# Patient Record
Sex: FEMALE | Race: WHITE | NOT HISPANIC OR LATINO | Employment: UNEMPLOYED | URBAN - METROPOLITAN AREA
[De-identification: names, ages, dates, MRNs, and addresses within clinical notes are randomized per-mention and may not be internally consistent; named-entity substitution may affect disease eponyms.]

---

## 2020-03-01 ENCOUNTER — APPOINTMENT (OUTPATIENT)
Dept: ULTRASOUND IMAGING | Facility: HOSPITAL | Age: 55
End: 2020-03-01

## 2020-03-01 ENCOUNTER — HOSPITAL ENCOUNTER (INPATIENT)
Facility: HOSPITAL | Age: 55
LOS: 5 days | Discharge: NURSING, REHAB OR LONG-TERM CARE | End: 2020-03-07
Attending: HOSPITALIST | Admitting: INTERNAL MEDICINE

## 2020-03-01 DIAGNOSIS — E86.0 DEHYDRATION: ICD-10-CM

## 2020-03-01 DIAGNOSIS — R11.2 NON-INTRACTABLE VOMITING WITH NAUSEA, UNSPECIFIED VOMITING TYPE: ICD-10-CM

## 2020-03-01 DIAGNOSIS — R73.9 HYPERGLYCEMIA: ICD-10-CM

## 2020-03-01 DIAGNOSIS — R19.7 DIARRHEA, UNSPECIFIED TYPE: ICD-10-CM

## 2020-03-01 DIAGNOSIS — E87.6 HYPOKALEMIA, INADEQUATE INTAKE: Primary | ICD-10-CM

## 2020-03-01 DIAGNOSIS — B37.0 ORAL THRUSH: ICD-10-CM

## 2020-03-01 PROBLEM — R74.01 TRANSAMINITIS: Status: ACTIVE | Noted: 2020-03-01

## 2020-03-01 PROBLEM — IMO0001 ALCOHOLISM /ALCOHOL ABUSE: Chronic | Status: ACTIVE | Noted: 2020-03-01

## 2020-03-01 PROBLEM — G47.00 INSOMNIA: Chronic | Status: ACTIVE | Noted: 2020-03-01

## 2020-03-01 PROBLEM — Z72.0 TOBACCO ABUSE: Chronic | Status: ACTIVE | Noted: 2020-03-01

## 2020-03-01 PROBLEM — E87.1 HYPONATREMIA: Status: ACTIVE | Noted: 2020-03-01

## 2020-03-01 LAB
ALBUMIN SERPL-MCNC: 3.1 G/DL (ref 3.5–5.2)
ALBUMIN/GLOB SERPL: 1.6 G/DL
ALP SERPL-CCNC: 155 U/L (ref 39–117)
ALT SERPL W P-5'-P-CCNC: 60 U/L (ref 1–33)
ANION GAP SERPL CALCULATED.3IONS-SCNC: 24 MMOL/L (ref 5–15)
ANION GAP SERPL CALCULATED.3IONS-SCNC: 29 MMOL/L (ref 5–15)
AST SERPL-CCNC: 52 U/L (ref 1–32)
BACTERIA UR QL AUTO: ABNORMAL /HPF
BASOPHILS # BLD AUTO: 0 10*3/MM3 (ref 0–0.2)
BASOPHILS NFR BLD AUTO: 0.3 % (ref 0–1.5)
BILIRUB SERPL-MCNC: 1.2 MG/DL (ref 0.2–1.2)
BILIRUB UR QL STRIP: ABNORMAL
BUN BLD-MCNC: 12 MG/DL (ref 6–20)
BUN BLD-MCNC: 9 MG/DL (ref 6–20)
BUN/CREAT SERPL: 17.4 (ref 7–25)
BUN/CREAT SERPL: 18 (ref 7–25)
CALCIUM SPEC-SCNC: 6.9 MG/DL (ref 8.6–10.5)
CALCIUM SPEC-SCNC: 7.3 MG/DL (ref 8.6–10.5)
CHLORIDE SERPL-SCNC: 71 MMOL/L (ref 98–107)
CHLORIDE SERPL-SCNC: 80 MMOL/L (ref 98–107)
CLARITY UR: ABNORMAL
CO2 SERPL-SCNC: 23 MMOL/L (ref 22–29)
CO2 SERPL-SCNC: 24 MMOL/L (ref 22–29)
COLOR UR: YELLOW
CREAT BLD-MCNC: 0.5 MG/DL (ref 0.57–1)
CREAT BLD-MCNC: 0.69 MG/DL (ref 0.57–1)
DEPRECATED RDW RBC AUTO: 44.2 FL (ref 37–54)
EOSINOPHIL # BLD AUTO: 0 10*3/MM3 (ref 0–0.4)
EOSINOPHIL NFR BLD AUTO: 0.1 % (ref 0.3–6.2)
ERYTHROCYTE [DISTWIDTH] IN BLOOD BY AUTOMATED COUNT: 12.3 % (ref 12.3–15.4)
GFR SERPL CREATININE-BSD FRML MDRD: 128 ML/MIN/1.73
GFR SERPL CREATININE-BSD FRML MDRD: 88 ML/MIN/1.73
GLOBULIN UR ELPH-MCNC: 1.9 GM/DL
GLUCOSE BLD-MCNC: 325 MG/DL (ref 65–99)
GLUCOSE BLD-MCNC: 419 MG/DL (ref 65–99)
GLUCOSE BLDC GLUCOMTR-MCNC: 322 MG/DL (ref 70–105)
GLUCOSE BLDC GLUCOMTR-MCNC: 333 MG/DL (ref 70–105)
GLUCOSE BLDC GLUCOMTR-MCNC: 460 MG/DL (ref 70–105)
GLUCOSE UR STRIP-MCNC: ABNORMAL MG/DL
HCT VFR BLD AUTO: 40 % (ref 34–46.6)
HGB BLD-MCNC: 14.6 G/DL (ref 12–15.9)
HGB UR QL STRIP.AUTO: ABNORMAL
HYALINE CASTS UR QL AUTO: ABNORMAL /LPF
INR PPP: 0.98 (ref 0.9–1.1)
KETONES UR QL STRIP: ABNORMAL
KETONES UR QL STRIP: ABNORMAL
LEUKOCYTE ESTERASE UR QL STRIP.AUTO: ABNORMAL
LIPASE SERPL-CCNC: 39 U/L (ref 13–60)
LYMPHOCYTES # BLD AUTO: 1.1 10*3/MM3 (ref 0.7–3.1)
LYMPHOCYTES NFR BLD AUTO: 15.9 % (ref 19.6–45.3)
MAGNESIUM SERPL-MCNC: 1.8 MG/DL (ref 1.6–2.6)
MCH RBC QN AUTO: 36.9 PG (ref 26.6–33)
MCHC RBC AUTO-ENTMCNC: 36.5 G/DL (ref 31.5–35.7)
MCV RBC AUTO: 101.1 FL (ref 79–97)
MONOCYTES # BLD AUTO: 0.7 10*3/MM3 (ref 0.1–0.9)
MONOCYTES NFR BLD AUTO: 10 % (ref 5–12)
NEUTROPHILS # BLD AUTO: 5.2 10*3/MM3 (ref 1.7–7)
NEUTROPHILS NFR BLD AUTO: 73.7 % (ref 42.7–76)
NITRITE UR QL STRIP: NEGATIVE
NRBC BLD AUTO-RTO: 0.1 /100 WBC (ref 0–0.2)
NT-PROBNP SERPL-MCNC: 2043 PG/ML (ref 5–900)
PH UR STRIP.AUTO: 6.5 [PH] (ref 5–8)
PLATELET # BLD AUTO: 230 10*3/MM3 (ref 140–450)
PMV BLD AUTO: 11.1 FL (ref 6–12)
POTASSIUM BLD-SCNC: 1.6 MMOL/L (ref 3.5–5.2)
POTASSIUM BLD-SCNC: 2.3 MMOL/L (ref 3.5–5.2)
POTASSIUM BLD-SCNC: 2.3 MMOL/L (ref 3.5–5.2)
PROT SERPL-MCNC: 5 G/DL (ref 6–8.5)
PROT UR QL STRIP: ABNORMAL
PROTHROMBIN TIME: 10.3 SECONDS (ref 9.6–11.7)
RBC # BLD AUTO: 3.96 10*6/MM3 (ref 3.77–5.28)
RBC # UR: ABNORMAL /HPF
REF LAB TEST METHOD: ABNORMAL
SODIUM BLD-SCNC: 124 MMOL/L (ref 136–145)
SODIUM BLD-SCNC: 127 MMOL/L (ref 136–145)
SP GR UR STRIP: 1.02 (ref 1–1.03)
SQUAMOUS #/AREA URNS HPF: ABNORMAL /HPF
TROPONIN T SERPL-MCNC: <0.01 NG/ML (ref 0–0.03)
UROBILINOGEN UR QL STRIP: ABNORMAL
WBC NRBC COR # BLD: 7 10*3/MM3 (ref 3.4–10.8)
WBC UR QL AUTO: ABNORMAL /HPF

## 2020-03-01 PROCEDURE — 85610 PROTHROMBIN TIME: CPT | Performed by: NURSE PRACTITIONER

## 2020-03-01 PROCEDURE — 63710000001 INSULIN LISPRO (HUMAN) PER 5 UNITS: Performed by: INTERNAL MEDICINE

## 2020-03-01 PROCEDURE — 25810000003 SODIUM CHLORIDE 0.9 % WITH KCL 20 MEQ 20-0.9 MEQ/L-% SOLUTION: Performed by: HOSPITALIST

## 2020-03-01 PROCEDURE — 84132 ASSAY OF SERUM POTASSIUM: CPT | Performed by: PHYSICIAN ASSISTANT

## 2020-03-01 PROCEDURE — 25810000003 SODIUM CHLORIDE 0.9 % WITH KCL 40 MEQ/L 40-0.9 MEQ/L-% SOLUTION: Performed by: INTERNAL MEDICINE

## 2020-03-01 PROCEDURE — 83690 ASSAY OF LIPASE: CPT | Performed by: PHYSICIAN ASSISTANT

## 2020-03-01 PROCEDURE — 99284 EMERGENCY DEPT VISIT MOD MDM: CPT

## 2020-03-01 PROCEDURE — 25010000002 ONDANSETRON PER 1 MG: Performed by: NURSE PRACTITIONER

## 2020-03-01 PROCEDURE — 85025 COMPLETE CBC W/AUTO DIFF WBC: CPT | Performed by: NURSE PRACTITIONER

## 2020-03-01 PROCEDURE — 25010000003 POTASSIUM CHLORIDE 10 MEQ/100ML SOLUTION: Performed by: HOSPITALIST

## 2020-03-01 PROCEDURE — 82962 GLUCOSE BLOOD TEST: CPT

## 2020-03-01 PROCEDURE — 81003 URINALYSIS AUTO W/O SCOPE: CPT | Performed by: INTERNAL MEDICINE

## 2020-03-01 PROCEDURE — 83880 ASSAY OF NATRIURETIC PEPTIDE: CPT | Performed by: NURSE PRACTITIONER

## 2020-03-01 PROCEDURE — 25010000003 POTASSIUM CHLORIDE 10 MEQ/100ML SOLUTION: Performed by: NURSE PRACTITIONER

## 2020-03-01 PROCEDURE — 84484 ASSAY OF TROPONIN QUANT: CPT | Performed by: NURSE PRACTITIONER

## 2020-03-01 PROCEDURE — 93005 ELECTROCARDIOGRAM TRACING: CPT

## 2020-03-01 PROCEDURE — G0378 HOSPITAL OBSERVATION PER HR: HCPCS

## 2020-03-01 PROCEDURE — 76705 ECHO EXAM OF ABDOMEN: CPT

## 2020-03-01 PROCEDURE — 81001 URINALYSIS AUTO W/SCOPE: CPT | Performed by: NURSE PRACTITIONER

## 2020-03-01 PROCEDURE — 93005 ELECTROCARDIOGRAM TRACING: CPT | Performed by: HOSPITALIST

## 2020-03-01 PROCEDURE — 36415 COLL VENOUS BLD VENIPUNCTURE: CPT

## 2020-03-01 PROCEDURE — 80053 COMPREHEN METABOLIC PANEL: CPT | Performed by: NURSE PRACTITIONER

## 2020-03-01 PROCEDURE — 99219 PR INITIAL OBSERVATION CARE/DAY 50 MINUTES: CPT | Performed by: PHYSICIAN ASSISTANT

## 2020-03-01 PROCEDURE — 83735 ASSAY OF MAGNESIUM: CPT | Performed by: HOSPITALIST

## 2020-03-01 PROCEDURE — 87086 URINE CULTURE/COLONY COUNT: CPT | Performed by: NURSE PRACTITIONER

## 2020-03-01 PROCEDURE — 80048 BASIC METABOLIC PNL TOTAL CA: CPT | Performed by: INTERNAL MEDICINE

## 2020-03-01 RX ORDER — LORAZEPAM 0.5 MG/1
0.5 TABLET ORAL
Status: DISCONTINUED | OUTPATIENT
Start: 2020-03-01 | End: 2020-03-06

## 2020-03-01 RX ORDER — LORAZEPAM 2 MG/ML
0.5 INJECTION INTRAMUSCULAR
Status: DISCONTINUED | OUTPATIENT
Start: 2020-03-01 | End: 2020-03-06

## 2020-03-01 RX ORDER — MAGNESIUM SULFATE HEPTAHYDRATE 40 MG/ML
2 INJECTION, SOLUTION INTRAVENOUS AS NEEDED
Status: DISCONTINUED | OUTPATIENT
Start: 2020-03-01 | End: 2020-03-07 | Stop reason: HOSPADM

## 2020-03-01 RX ORDER — ONDANSETRON 4 MG/1
4 TABLET, FILM COATED ORAL EVERY 6 HOURS PRN
Status: DISCONTINUED | OUTPATIENT
Start: 2020-03-01 | End: 2020-03-07 | Stop reason: HOSPADM

## 2020-03-01 RX ORDER — CALCIUM GLUCONATE 20 MG/ML
2 INJECTION, SOLUTION INTRAVENOUS AS NEEDED
Status: DISCONTINUED | OUTPATIENT
Start: 2020-03-01 | End: 2020-03-07 | Stop reason: HOSPADM

## 2020-03-01 RX ORDER — SODIUM CHLORIDE 0.9 % (FLUSH) 0.9 %
10 SYRINGE (ML) INJECTION EVERY 12 HOURS SCHEDULED
Status: DISCONTINUED | OUTPATIENT
Start: 2020-03-01 | End: 2020-03-07 | Stop reason: HOSPADM

## 2020-03-01 RX ORDER — SODIUM CHLORIDE AND POTASSIUM CHLORIDE 300; 900 MG/100ML; MG/100ML
150 INJECTION, SOLUTION INTRAVENOUS CONTINUOUS
Status: DISCONTINUED | OUTPATIENT
Start: 2020-03-01 | End: 2020-03-03

## 2020-03-01 RX ORDER — POTASSIUM CHLORIDE 20 MEQ/1
40 TABLET, EXTENDED RELEASE ORAL AS NEEDED
Status: DISCONTINUED | OUTPATIENT
Start: 2020-03-01 | End: 2020-03-07 | Stop reason: HOSPADM

## 2020-03-01 RX ORDER — LORAZEPAM 2 MG/ML
1 INJECTION INTRAMUSCULAR
Status: DISCONTINUED | OUTPATIENT
Start: 2020-03-01 | End: 2020-03-06

## 2020-03-01 RX ORDER — SODIUM CHLORIDE 0.9 % (FLUSH) 0.9 %
10 SYRINGE (ML) INJECTION AS NEEDED
Status: DISCONTINUED | OUTPATIENT
Start: 2020-03-01 | End: 2020-03-07 | Stop reason: HOSPADM

## 2020-03-01 RX ORDER — CALCIUM CARBONATE 200(500)MG
2 TABLET,CHEWABLE ORAL 2 TIMES DAILY PRN
Status: DISCONTINUED | OUTPATIENT
Start: 2020-03-01 | End: 2020-03-06

## 2020-03-01 RX ORDER — MAGNESIUM SULFATE HEPTAHYDRATE 40 MG/ML
4 INJECTION, SOLUTION INTRAVENOUS AS NEEDED
Status: DISCONTINUED | OUTPATIENT
Start: 2020-03-01 | End: 2020-03-07 | Stop reason: HOSPADM

## 2020-03-01 RX ORDER — POTASSIUM CHLORIDE 7.45 MG/ML
10 INJECTION INTRAVENOUS
Status: DISCONTINUED | OUTPATIENT
Start: 2020-03-01 | End: 2020-03-07 | Stop reason: HOSPADM

## 2020-03-01 RX ORDER — POTASSIUM CHLORIDE 20 MEQ/1
40 TABLET, EXTENDED RELEASE ORAL ONCE
Status: COMPLETED | OUTPATIENT
Start: 2020-03-01 | End: 2020-03-01

## 2020-03-01 RX ORDER — DOCUSATE SODIUM 100 MG/1
100 CAPSULE, LIQUID FILLED ORAL 2 TIMES DAILY PRN
Status: DISCONTINUED | OUTPATIENT
Start: 2020-03-01 | End: 2020-03-05

## 2020-03-01 RX ORDER — NICOTINE 21 MG/24HR
1 PATCH, TRANSDERMAL 24 HOURS TRANSDERMAL NIGHTLY
Status: DISCONTINUED | OUTPATIENT
Start: 2020-03-01 | End: 2020-03-06

## 2020-03-01 RX ORDER — SODIUM CHLORIDE AND POTASSIUM CHLORIDE 150; 900 MG/100ML; MG/100ML
100 INJECTION, SOLUTION INTRAVENOUS CONTINUOUS
Status: DISCONTINUED | OUTPATIENT
Start: 2020-03-01 | End: 2020-03-01

## 2020-03-01 RX ORDER — POTASSIUM CHLORIDE 1.5 G/1.77G
40 POWDER, FOR SOLUTION ORAL AS NEEDED
Status: DISCONTINUED | OUTPATIENT
Start: 2020-03-01 | End: 2020-03-07 | Stop reason: HOSPADM

## 2020-03-01 RX ORDER — POTASSIUM CHLORIDE 7.45 MG/ML
10 INJECTION INTRAVENOUS
Status: DISCONTINUED | OUTPATIENT
Start: 2020-03-01 | End: 2020-03-01

## 2020-03-01 RX ORDER — IBUPROFEN 200 MG
200 TABLET ORAL EVERY 6 HOURS PRN
COMMUNITY
End: 2020-03-07 | Stop reason: HOSPADM

## 2020-03-01 RX ORDER — LORAZEPAM 1 MG/1
1 TABLET ORAL
Status: DISCONTINUED | OUTPATIENT
Start: 2020-03-01 | End: 2020-03-06

## 2020-03-01 RX ORDER — ONDANSETRON 2 MG/ML
4 INJECTION INTRAMUSCULAR; INTRAVENOUS EVERY 6 HOURS PRN
Status: DISCONTINUED | OUTPATIENT
Start: 2020-03-01 | End: 2020-03-07 | Stop reason: HOSPADM

## 2020-03-01 RX ORDER — CALCIUM GLUCONATE 20 MG/ML
1 INJECTION, SOLUTION INTRAVENOUS AS NEEDED
Status: DISCONTINUED | OUTPATIENT
Start: 2020-03-01 | End: 2020-03-07 | Stop reason: HOSPADM

## 2020-03-01 RX ORDER — ONDANSETRON 2 MG/ML
4 INJECTION INTRAMUSCULAR; INTRAVENOUS ONCE
Status: COMPLETED | OUTPATIENT
Start: 2020-03-01 | End: 2020-03-01

## 2020-03-01 RX ORDER — LOPERAMIDE HYDROCHLORIDE 2 MG/1
2 CAPSULE ORAL 4 TIMES DAILY PRN
Status: DISCONTINUED | OUTPATIENT
Start: 2020-03-01 | End: 2020-03-05

## 2020-03-01 RX ORDER — NITROGLYCERIN 0.4 MG/1
0.4 TABLET SUBLINGUAL
Status: DISCONTINUED | OUTPATIENT
Start: 2020-03-01 | End: 2020-03-07 | Stop reason: HOSPADM

## 2020-03-01 RX ORDER — CHOLECALCIFEROL (VITAMIN D3) 125 MCG
5 CAPSULE ORAL NIGHTLY PRN
Status: DISCONTINUED | OUTPATIENT
Start: 2020-03-01 | End: 2020-03-07 | Stop reason: HOSPADM

## 2020-03-01 RX ADMIN — SODIUM CHLORIDE AND POTASSIUM CHLORIDE 150 ML/HR: 9; 2.98 INJECTION, SOLUTION INTRAVENOUS at 22:43

## 2020-03-01 RX ADMIN — SODIUM CHLORIDE, SODIUM LACTATE, POTASSIUM CHLORIDE, AND CALCIUM CHLORIDE 2000 ML: 600; 310; 30; 20 INJECTION, SOLUTION INTRAVENOUS at 17:49

## 2020-03-01 RX ADMIN — POTASSIUM CHLORIDE 10 MEQ: 7.46 INJECTION, SOLUTION INTRAVENOUS at 17:06

## 2020-03-01 RX ADMIN — POTASSIUM CHLORIDE 10 MEQ: 7.46 INJECTION, SOLUTION INTRAVENOUS at 18:10

## 2020-03-01 RX ADMIN — POTASSIUM CHLORIDE 10 MEQ: 7.46 INJECTION, SOLUTION INTRAVENOUS at 19:50

## 2020-03-01 RX ADMIN — ONDANSETRON 4 MG: 2 INJECTION INTRAMUSCULAR; INTRAVENOUS at 14:47

## 2020-03-01 RX ADMIN — SODIUM CHLORIDE AND POTASSIUM CHLORIDE 100 ML/HR: 9; 1.49 INJECTION, SOLUTION INTRAVENOUS at 18:56

## 2020-03-01 RX ADMIN — Medication 10 ML: at 21:04

## 2020-03-01 RX ADMIN — NICOTINE 1 PATCH: 21 PATCH TRANSDERMAL at 21:04

## 2020-03-01 RX ADMIN — POTASSIUM CHLORIDE 10 MEQ: 7.46 INJECTION, SOLUTION INTRAVENOUS at 21:34

## 2020-03-01 RX ADMIN — INSULIN LISPRO 8 UNITS: 100 INJECTION, SOLUTION INTRAVENOUS; SUBCUTANEOUS at 23:04

## 2020-03-01 RX ADMIN — NYSTATIN 500000 UNITS: 100000 SUSPENSION ORAL at 20:42

## 2020-03-01 RX ADMIN — SODIUM CHLORIDE 1000 ML: 0.9 INJECTION, SOLUTION INTRAVENOUS at 14:47

## 2020-03-01 RX ADMIN — POTASSIUM CHLORIDE 40 MEQ: 1500 TABLET, EXTENDED RELEASE ORAL at 17:48

## 2020-03-01 NOTE — ED PROVIDER NOTES
Subjective   Patient is a 54-year-old thin female who presents with a 9-day history of nausea vomiting and diarrhea.  She states she has become very weak and had several falls over the last several days.  She denies any pain fever -chills or body aches and pains.  Denies exposure to the flu.  He states she has had decreased oral intake.          Review of Systems   Constitutional: Positive for appetite change and fatigue. Negative for chills and fever.   HENT: Negative for congestion, tinnitus and trouble swallowing.    Eyes: Negative for photophobia, discharge and redness.   Respiratory: Negative for cough and shortness of breath.    Cardiovascular: Negative for chest pain and palpitations.   Gastrointestinal: Positive for diarrhea, nausea and vomiting. Negative for abdominal pain.   Genitourinary: Negative for dysuria, frequency and urgency.   Musculoskeletal: Negative for back pain, joint swelling and myalgias.   Skin: Negative for rash.   Neurological: Negative for dizziness and headaches.   Psychiatric/Behavioral: Negative for confusion.   All other systems reviewed and are negative.      Past Medical History:   Diagnosis Date   • Lupus (CMS/HCC)        Allergies   Allergen Reactions   • Penicillins Unknown - High Severity       Past Surgical History:   Procedure Laterality Date   •  SECTION     • CHOLECYSTECTOMY     • HYSTERECTOMY         History reviewed. No pertinent family history.    Social History     Socioeconomic History   • Marital status: Single     Spouse name: Not on file   • Number of children: Not on file   • Years of education: Not on file   • Highest education level: Not on file   Tobacco Use   • Smoking status: Current Some Day Smoker     Packs/day: 0.50   Substance and Sexual Activity   • Alcohol use: Yes     Alcohol/week: 4.0 standard drinks     Types: 4 Shots of liquor per week     Comment: a day   • Drug use: Never   • Sexual activity: Defer           Objective   Physical Exam  "  Constitutional: She is oriented to person, place, and time. She appears well-developed and well-nourished.   HENT:   Head: Normocephalic and atraumatic.   Mouth/Throat: Uvula is midline and oropharynx is clear and moist. Mucous membranes are dry.       Eyes: Pupils are equal, round, and reactive to light. Conjunctivae and EOM are normal.   Neck: Normal range of motion. Neck supple.   Cardiovascular: Normal rate, regular rhythm, normal heart sounds and intact distal pulses.   Pulmonary/Chest: Effort normal and breath sounds normal. No respiratory distress. She has no wheezes.   Abdominal: Soft. Bowel sounds are normal. She exhibits no distension and no mass. There is no tenderness. There is no rebound and no guarding.   Musculoskeletal: Normal range of motion. She exhibits no deformity.   Neurological: She is alert and oriented to person, place, and time. She displays normal reflexes. No cranial nerve deficit or sensory deficit. GCS eye subscore is 4. GCS verbal subscore is 5. GCS motor subscore is 6.   Skin: Skin is warm and dry. Capillary refill takes less than 2 seconds.   Psychiatric: She has a normal mood and affect.   Vitals reviewed.      ECG 12 Lead    Date/Time: 3/1/2020 3:18 PM  Performed by: Anahi Green APRN  Authorized by: Anahi Green APRN   Interpreted by ED physician: Luis.  Comparison: not compared with previous ECG   Previous ECG: no previous ECG available  Rhythm: sinus rhythm  Rate: normal  BPM: 93  QRS axis: normal  Conduction comments: Possible potassium abnormalities  ST Segments: ST segments normal  Clinical impression: abnormal ECG                 ED Course      BP 92/66   Pulse 88   Temp 98.3 °F (36.8 °C)   Resp 18   Ht 157.5 cm (62\")   Wt 47.8 kg (105 lb 6.1 oz)   SpO2 95%   BMI 19.27 kg/m²   Labs Reviewed   COMPREHENSIVE METABOLIC PANEL - Abnormal; Notable for the following components:       Result Value    Glucose 419 (*)     Sodium 124 (*)     Potassium 1.6 " (*)     Chloride 71 (*)     Calcium 7.3 (*)     Total Protein 5.0 (*)     Albumin 3.10 (*)     ALT (SGPT) 60 (*)     AST (SGOT) 52 (*)     Alkaline Phosphatase 155 (*)     Anion Gap 29.0 (*)     All other components within normal limits    Narrative:     GFR Normal >60  Chronic Kidney Disease <60  Kidney Failure <15     URINALYSIS W/ CULTURE IF INDICATED - Abnormal; Notable for the following components:    Appearance, UA Cloudy (*)     Glucose, UA >=1000 mg/dL (3+) (*)     Ketones, UA 80 mg/dL (3+) (*)     Bilirubin, UA Moderate (2+) (*)     Blood, UA Trace (*)     Protein, UA 30 mg/dL (1+) (*)     Leuk Esterase, UA Small (1+) (*)     All other components within normal limits   BNP (IN-HOUSE) - Abnormal; Notable for the following components:    proBNP 2,043.0 (*)     All other components within normal limits    Narrative:     Among patients with dyspnea, NT-proBNP is highly sensitive for the detection of acute congestive heart failure. In addition NT-proBNP of <300 pg/ml effectively rules out acute congestive heart failure with 99% negative predictive value.    Results may be falsely decreased if patient taking Biotin.     CBC WITH AUTO DIFFERENTIAL - Abnormal; Notable for the following components:    .1 (*)     MCH 36.9 (*)     MCHC 36.5 (*)     Lymphocyte % 15.9 (*)     Eosinophil % 0.1 (*)     All other components within normal limits   URINALYSIS, MICROSCOPIC ONLY - Abnormal; Notable for the following components:    RBC, UA 3-5 (*)     WBC, UA 6-12 (*)     Bacteria, UA Trace (*)     Squamous Epithelial Cells, UA 3-6 (*)     All other components within normal limits   POCT GLUCOSE FINGERSTICK - Abnormal; Notable for the following components:    Glucose 460 (*)     All other components within normal limits   PROTIME-INR - Normal   TROPONIN (IN-HOUSE) - Normal    Narrative:     Troponin T Reference Range:  <= 0.03 ng/mL-   Negative for AMI  >0.03 ng/mL-     Abnormal for myocardial necrosis.  Clinicians would  have to utilize clinical acumen, EKG, Troponin and serial changes to determine if it is an Acute Myocardial Infarction or myocardial injury due to an underlying chronic condition.       Results may be falsely decreased if patient taking Biotin.     URINE CULTURE   CBC AND DIFFERENTIAL    Narrative:     The following orders were created for panel order CBC & Differential.  Procedure                               Abnormality         Status                     ---------                               -----------         ------                     CBC Auto Differential[[id number]]        Abnormal            Final result                 Please view results for these tests on the individual orders.     Medications   sodium chloride 0.9 % flush 10 mL (has no administration in time range)   potassium chloride 10 mEq in 100 mL IVPB (has no administration in time range)   insulin regular (humuLIN R,novoLIN R) injection 8 Units (has no administration in time range)   sodium chloride 0.9 % bolus 1,000 mL (1,000 mL Intravenous New Bag 3/1/20 1447)   ondansetron (ZOFRAN) injection 4 mg (4 mg Intravenous Given 3/1/20 1447)     No radiology results for the last day                                       MDM  Number of Diagnoses or Management Options  Dehydration:   Diarrhea, unspecified type:   Hyperglycemia:   Hypokalemia, inadequate intake:   Non-intractable vomiting with nausea, unspecified vomiting type:   Oral thrush:   Diagnosis management comments: Patient had IV established and blood work was obtained urinalysis showed 80 ketones in the urine.  And had IV fluids initiated and lab work came back with a potassium of 1.6 the potassium replacement protocol was initiated.  The patient was also hyponatremic 124 and a corrected potassium of 132-the bicarb was found to be 24.  Patient was discussed with Dr. Gonzalez who agrees the patient is not acidotic at this time.-And IV insulin drip will not be initiated at this time the patient  did have IV potassium initiated I discussed the patient with Dr. Best  Patient will be admitted to the PCU.  The patient was made aware of these findings and the need for admission and was agreeable to this plan of care.    Finally the patient also has oral candidiasis and will be treated for such       Amount and/or Complexity of Data Reviewed  Clinical lab tests: reviewed  Tests in the medicine section of CPT®: reviewed    Critical Care  Total time providing critical care: 30-74 minutes    Patient Progress  Patient progress: improved      Final diagnoses:   Hypokalemia, inadequate intake   Non-intractable vomiting with nausea, unspecified vomiting type   Diarrhea, unspecified type   Dehydration   Hyperglycemia   Oral thrush            Anahi Green, GARFIELD  03/01/20 1621       Anahi Green, GARFIELD  03/01/20 1228

## 2020-03-01 NOTE — ED NOTES
Pt reports she started to feel weak 9 days ago. Pt states she can not stand up and she has tingling down her legs. Pt reports it has caused her to fall.     Jagdish Sr, LPN  03/01/20 8260

## 2020-03-02 ENCOUNTER — APPOINTMENT (OUTPATIENT)
Dept: GENERAL RADIOLOGY | Facility: HOSPITAL | Age: 55
End: 2020-03-02

## 2020-03-02 PROBLEM — E83.39 HYPOPHOSPHATEMIA: Status: ACTIVE | Noted: 2020-03-02

## 2020-03-02 PROBLEM — T73.0XXA STARVATION KETOACIDOSIS: Status: ACTIVE | Noted: 2020-03-02

## 2020-03-02 PROBLEM — E87.29 STARVATION KETOACIDOSIS: Status: ACTIVE | Noted: 2020-03-02

## 2020-03-02 PROBLEM — E11.9 TYPE 2 DIABETES MELLITUS: Status: ACTIVE | Noted: 2020-03-02

## 2020-03-02 LAB
ADV 40+41 DNA STL QL NAA+NON-PROBE: NOT DETECTED
ALBUMIN SERPL-MCNC: 2.8 G/DL (ref 3.5–5.2)
ALBUMIN/GLOB SERPL: 1.6 G/DL
ALP SERPL-CCNC: 121 U/L (ref 39–117)
ALT SERPL W P-5'-P-CCNC: 52 U/L (ref 1–33)
ANION GAP SERPL CALCULATED.3IONS-SCNC: 14 MMOL/L (ref 5–15)
ANION GAP SERPL CALCULATED.3IONS-SCNC: 14 MMOL/L (ref 5–15)
AST SERPL-CCNC: 55 U/L (ref 1–32)
ASTRO TYP 1-8 RNA STL QL NAA+NON-PROBE: NOT DETECTED
B PERT DNA SPEC QL NAA+PROBE: NOT DETECTED
BACTERIA SPEC AEROBE CULT: NORMAL
BASOPHILS # BLD AUTO: 0 10*3/MM3 (ref 0–0.2)
BASOPHILS NFR BLD AUTO: 0.5 % (ref 0–1.5)
BILIRUB SERPL-MCNC: 1.1 MG/DL (ref 0.2–1.2)
BUN BLD-MCNC: 6 MG/DL (ref 6–20)
BUN BLD-MCNC: 7 MG/DL (ref 6–20)
BUN/CREAT SERPL: 14.6 (ref 7–25)
BUN/CREAT SERPL: 15.8 (ref 7–25)
C CAYETANENSIS DNA STL QL NAA+NON-PROBE: NOT DETECTED
C PNEUM DNA NPH QL NAA+NON-PROBE: NOT DETECTED
CALCIUM SPEC-SCNC: 7.6 MG/DL (ref 8.6–10.5)
CALCIUM SPEC-SCNC: 7.7 MG/DL (ref 8.6–10.5)
CAMPY SP DNA.DIARRHEA STL QL NAA+PROBE: NOT DETECTED
CHLORIDE SERPL-SCNC: 92 MMOL/L (ref 98–107)
CHLORIDE SERPL-SCNC: 97 MMOL/L (ref 98–107)
CHOLEST SERPL-MCNC: 98 MG/DL (ref 0–200)
CK SERPL-CCNC: 41 U/L (ref 20–180)
CO2 SERPL-SCNC: 24 MMOL/L (ref 22–29)
CO2 SERPL-SCNC: 32 MMOL/L (ref 22–29)
CREAT BLD-MCNC: 0.38 MG/DL (ref 0.57–1)
CREAT BLD-MCNC: 0.48 MG/DL (ref 0.57–1)
CRYPTOSP STL CULT: NOT DETECTED
D DIMER PPP FEU-MCNC: 0.35 MCGFEU/ML (ref 0.17–0.59)
D-LACTATE SERPL-SCNC: 1.3 MMOL/L (ref 0.5–2)
DEPRECATED RDW RBC AUTO: 43.3 FL (ref 37–54)
E COLI DNA SPEC QL NAA+PROBE: NOT DETECTED
E HISTOLYT AG STL-ACNC: NOT DETECTED
EAEC PAA PLAS AGGR+AATA ST NAA+NON-PRB: DETECTED
EC STX1 + STX2 GENES STL NAA+PROBE: NOT DETECTED
EOSINOPHIL # BLD AUTO: 0 10*3/MM3 (ref 0–0.4)
EOSINOPHIL NFR BLD AUTO: 0.4 % (ref 0.3–6.2)
EPEC EAE GENE STL QL NAA+NON-PROBE: NOT DETECTED
ERYTHROCYTE [DISTWIDTH] IN BLOOD BY AUTOMATED COUNT: 12.2 % (ref 12.3–15.4)
ETEC LTA+ST1A+ST1B TOX ST NAA+NON-PROBE: NOT DETECTED
FLUAV H1 2009 PAND RNA NPH QL NAA+PROBE: NOT DETECTED
FLUAV H1 HA GENE NPH QL NAA+PROBE: NOT DETECTED
FLUAV H3 RNA NPH QL NAA+PROBE: NOT DETECTED
FLUAV SUBTYP SPEC NAA+PROBE: NOT DETECTED
FLUBV RNA ISLT QL NAA+PROBE: NOT DETECTED
G LAMBLIA DNA SPEC QL NAA+PROBE: NOT DETECTED
GFR SERPL CREATININE-BSD FRML MDRD: 134 ML/MIN/1.73
GFR SERPL CREATININE-BSD FRML MDRD: >150 ML/MIN/1.73
GLOBULIN UR ELPH-MCNC: 1.7 GM/DL
GLUCOSE BLD-MCNC: 141 MG/DL (ref 65–99)
GLUCOSE BLD-MCNC: 227 MG/DL (ref 65–99)
GLUCOSE BLDC GLUCOMTR-MCNC: 113 MG/DL (ref 70–105)
GLUCOSE BLDC GLUCOMTR-MCNC: 124 MG/DL (ref 70–105)
GLUCOSE BLDC GLUCOMTR-MCNC: 138 MG/DL (ref 70–105)
GLUCOSE BLDC GLUCOMTR-MCNC: 155 MG/DL (ref 70–105)
GLUCOSE BLDC GLUCOMTR-MCNC: 156 MG/DL (ref 70–105)
GLUCOSE BLDC GLUCOMTR-MCNC: 202 MG/DL (ref 70–105)
GLUCOSE BLDC GLUCOMTR-MCNC: 204 MG/DL (ref 70–105)
GLUCOSE BLDC GLUCOMTR-MCNC: 301 MG/DL (ref 70–105)
GLUCOSE BLDC GLUCOMTR-MCNC: 348 MG/DL (ref 70–105)
GLUCOSE BLDC GLUCOMTR-MCNC: 389 MG/DL (ref 70–105)
HADV DNA SPEC NAA+PROBE: NOT DETECTED
HBA1C MFR BLD: 12.9 % (ref 3.5–5.6)
HCOV 229E RNA SPEC QL NAA+PROBE: NOT DETECTED
HCOV HKU1 RNA SPEC QL NAA+PROBE: NOT DETECTED
HCOV NL63 RNA SPEC QL NAA+PROBE: NOT DETECTED
HCOV OC43 RNA SPEC QL NAA+PROBE: NOT DETECTED
HCT VFR BLD AUTO: 31.9 % (ref 34–46.6)
HDLC SERPL-MCNC: 38 MG/DL (ref 40–60)
HGB BLD-MCNC: 11.3 G/DL (ref 12–15.9)
HMPV RNA NPH QL NAA+NON-PROBE: NOT DETECTED
HPIV1 RNA SPEC QL NAA+PROBE: NOT DETECTED
HPIV2 RNA SPEC QL NAA+PROBE: NOT DETECTED
HPIV3 RNA NPH QL NAA+PROBE: NOT DETECTED
HPIV4 P GENE NPH QL NAA+PROBE: NOT DETECTED
KETONES UR QL STRIP: ABNORMAL
LDLC SERPL CALC-MCNC: 27 MG/DL (ref 0–100)
LDLC/HDLC SERPL: 0.72 {RATIO}
LYMPHOCYTES # BLD AUTO: 1.2 10*3/MM3 (ref 0.7–3.1)
LYMPHOCYTES NFR BLD AUTO: 19.4 % (ref 19.6–45.3)
M PNEUMO IGG SER IA-ACNC: NOT DETECTED
MAGNESIUM SERPL-MCNC: 1.5 MG/DL (ref 1.6–2.6)
MAGNESIUM SERPL-MCNC: 1.8 MG/DL (ref 1.6–2.6)
MCH RBC QN AUTO: 35.7 PG (ref 26.6–33)
MCHC RBC AUTO-ENTMCNC: 35.4 G/DL (ref 31.5–35.7)
MCV RBC AUTO: 100.9 FL (ref 79–97)
MONOCYTES # BLD AUTO: 0.5 10*3/MM3 (ref 0.1–0.9)
MONOCYTES NFR BLD AUTO: 8.8 % (ref 5–12)
NEUTROPHILS # BLD AUTO: 4.3 10*3/MM3 (ref 1.7–7)
NEUTROPHILS NFR BLD AUTO: 70.9 % (ref 42.7–76)
NOROVIRUS GI+II RNA STL QL NAA+NON-PROBE: NOT DETECTED
NRBC BLD AUTO-RTO: 0.2 /100 WBC (ref 0–0.2)
NT-PROBNP SERPL-MCNC: 1117 PG/ML (ref 5–900)
P SHIGELLOIDES DNA STL QL NAA+PROBE: NOT DETECTED
PHOSPHATE SERPL-MCNC: <0.3 MG/DL (ref 2.5–4.5)
PLATELET # BLD AUTO: 202 10*3/MM3 (ref 140–450)
PMV BLD AUTO: 9.7 FL (ref 6–12)
POTASSIUM BLD-SCNC: 2.2 MMOL/L (ref 3.5–5.2)
POTASSIUM BLD-SCNC: 3.3 MMOL/L (ref 3.5–5.2)
PROT SERPL-MCNC: 4.5 G/DL (ref 6–8.5)
RBC # BLD AUTO: 3.16 10*6/MM3 (ref 3.77–5.28)
RHINOVIRUS RNA SPEC NAA+PROBE: NOT DETECTED
RSV RNA NPH QL NAA+NON-PROBE: NOT DETECTED
RV RNA STL NAA+PROBE: NOT DETECTED
SALMONELLA DNA SPEC QL NAA+PROBE: NOT DETECTED
SAPO I+II+IV+V RNA STL QL NAA+NON-PROBE: NOT DETECTED
SHIGELLA SP+EIEC IPAH STL QL NAA+PROBE: NOT DETECTED
SODIUM BLD-SCNC: 135 MMOL/L (ref 136–145)
SODIUM BLD-SCNC: 138 MMOL/L (ref 136–145)
TRIGL SERPL-MCNC: 163 MG/DL (ref 0–150)
TSH SERPL DL<=0.05 MIU/L-ACNC: 6.58 UIU/ML (ref 0.27–4.2)
V CHOLERAE DNA SPEC QL NAA+PROBE: NOT DETECTED
VIBRIO DNA SPEC NAA+PROBE: NOT DETECTED
VIT B12 BLD-MCNC: 800 PG/ML (ref 211–946)
VLDLC SERPL-MCNC: 32.6 MG/DL
WBC NRBC COR # BLD: 6.1 10*3/MM3 (ref 3.4–10.8)
YERSINIA STL CULT: NOT DETECTED

## 2020-03-02 PROCEDURE — 82607 VITAMIN B-12: CPT | Performed by: HOSPITALIST

## 2020-03-02 PROCEDURE — 81003 URINALYSIS AUTO W/O SCOPE: CPT | Performed by: INTERNAL MEDICINE

## 2020-03-02 PROCEDURE — 85379 FIBRIN DEGRADATION QUANT: CPT | Performed by: PHYSICIAN ASSISTANT

## 2020-03-02 PROCEDURE — 85025 COMPLETE CBC W/AUTO DIFF WBC: CPT | Performed by: PHYSICIAN ASSISTANT

## 2020-03-02 PROCEDURE — 99222 1ST HOSP IP/OBS MODERATE 55: CPT | Performed by: INTERNAL MEDICINE

## 2020-03-02 PROCEDURE — 97116 GAIT TRAINING THERAPY: CPT

## 2020-03-02 PROCEDURE — 84443 ASSAY THYROID STIM HORMONE: CPT | Performed by: PHYSICIAN ASSISTANT

## 2020-03-02 PROCEDURE — 82962 GLUCOSE BLOOD TEST: CPT

## 2020-03-02 PROCEDURE — 97535 SELF CARE MNGMENT TRAINING: CPT

## 2020-03-02 PROCEDURE — 80061 LIPID PANEL: CPT | Performed by: PHYSICIAN ASSISTANT

## 2020-03-02 PROCEDURE — 84100 ASSAY OF PHOSPHORUS: CPT | Performed by: PHYSICIAN ASSISTANT

## 2020-03-02 PROCEDURE — 0097U HC BIOFIRE FILMARRAY GI PANEL: CPT | Performed by: PHYSICIAN ASSISTANT

## 2020-03-02 PROCEDURE — 0099U HC BIOFIRE FILMARRAY RESP PANEL 1: CPT | Performed by: PHYSICIAN ASSISTANT

## 2020-03-02 PROCEDURE — 25810000003 SODIUM CHLORIDE 0.9 % WITH KCL 40 MEQ/L 40-0.9 MEQ/L-% SOLUTION: Performed by: INTERNAL MEDICINE

## 2020-03-02 PROCEDURE — 25010000002 MAGNESIUM SULFATE 2 GM/50ML SOLUTION: Performed by: PHYSICIAN ASSISTANT

## 2020-03-02 PROCEDURE — 83880 ASSAY OF NATRIURETIC PEPTIDE: CPT | Performed by: PHYSICIAN ASSISTANT

## 2020-03-02 PROCEDURE — 63710000001 INSULIN GLARGINE PER 5 UNITS: Performed by: INTERNAL MEDICINE

## 2020-03-02 PROCEDURE — 63710000001 INSULIN LISPRO (HUMAN) PER 5 UNITS: Performed by: INTERNAL MEDICINE

## 2020-03-02 PROCEDURE — 80053 COMPREHEN METABOLIC PANEL: CPT | Performed by: PHYSICIAN ASSISTANT

## 2020-03-02 PROCEDURE — 82550 ASSAY OF CK (CPK): CPT | Performed by: PHYSICIAN ASSISTANT

## 2020-03-02 PROCEDURE — 83036 HEMOGLOBIN GLYCOSYLATED A1C: CPT | Performed by: INTERNAL MEDICINE

## 2020-03-02 PROCEDURE — 94640 AIRWAY INHALATION TREATMENT: CPT

## 2020-03-02 PROCEDURE — 25010000002 ENOXAPARIN PER 10 MG: Performed by: HOSPITALIST

## 2020-03-02 PROCEDURE — 83735 ASSAY OF MAGNESIUM: CPT | Performed by: PHYSICIAN ASSISTANT

## 2020-03-02 PROCEDURE — 93005 ELECTROCARDIOGRAM TRACING: CPT | Performed by: PHYSICIAN ASSISTANT

## 2020-03-02 PROCEDURE — 83605 ASSAY OF LACTIC ACID: CPT | Performed by: INTERNAL MEDICINE

## 2020-03-02 PROCEDURE — 71045 X-RAY EXAM CHEST 1 VIEW: CPT

## 2020-03-02 PROCEDURE — 97162 PT EVAL MOD COMPLEX 30 MIN: CPT

## 2020-03-02 PROCEDURE — 99232 SBSQ HOSP IP/OBS MODERATE 35: CPT | Performed by: HOSPITALIST

## 2020-03-02 PROCEDURE — 97165 OT EVAL LOW COMPLEX 30 MIN: CPT

## 2020-03-02 PROCEDURE — 94799 UNLISTED PULMONARY SVC/PX: CPT

## 2020-03-02 PROCEDURE — 83735 ASSAY OF MAGNESIUM: CPT | Performed by: HOSPITALIST

## 2020-03-02 RX ORDER — SODIUM CHLORIDE 0.9 % (FLUSH) 0.9 %
10 SYRINGE (ML) INJECTION EVERY 12 HOURS SCHEDULED
Status: DISCONTINUED | OUTPATIENT
Start: 2020-03-02 | End: 2020-03-07 | Stop reason: HOSPADM

## 2020-03-02 RX ORDER — IPRATROPIUM BROMIDE AND ALBUTEROL SULFATE 2.5; .5 MG/3ML; MG/3ML
3 SOLUTION RESPIRATORY (INHALATION) EVERY 4 HOURS PRN
Status: DISCONTINUED | OUTPATIENT
Start: 2020-03-02 | End: 2020-03-06

## 2020-03-02 RX ORDER — THIAMINE HCL 100 MG
100 TABLET ORAL DAILY
Status: DISCONTINUED | OUTPATIENT
Start: 2020-03-02 | End: 2020-03-07 | Stop reason: HOSPADM

## 2020-03-02 RX ORDER — DEXTROSE MONOHYDRATE 25 G/50ML
25 INJECTION, SOLUTION INTRAVENOUS
Status: DISCONTINUED | OUTPATIENT
Start: 2020-03-02 | End: 2020-03-07 | Stop reason: HOSPADM

## 2020-03-02 RX ORDER — FOLIC ACID 1 MG/1
1 TABLET ORAL DAILY
Status: DISCONTINUED | OUTPATIENT
Start: 2020-03-02 | End: 2020-03-07 | Stop reason: HOSPADM

## 2020-03-02 RX ORDER — INSULIN GLARGINE 100 [IU]/ML
15 INJECTION, SOLUTION SUBCUTANEOUS EVERY MORNING
Status: DISCONTINUED | OUTPATIENT
Start: 2020-03-02 | End: 2020-03-03

## 2020-03-02 RX ORDER — SODIUM CHLORIDE 9 MG/ML
100 INJECTION, SOLUTION INTRAVENOUS CONTINUOUS
Status: DISCONTINUED | OUTPATIENT
Start: 2020-03-02 | End: 2020-03-04

## 2020-03-02 RX ORDER — SODIUM CHLORIDE 0.9 % (FLUSH) 0.9 %
10 SYRINGE (ML) INJECTION AS NEEDED
Status: DISCONTINUED | OUTPATIENT
Start: 2020-03-02 | End: 2020-03-07 | Stop reason: HOSPADM

## 2020-03-02 RX ORDER — NICOTINE POLACRILEX 4 MG
15 LOZENGE BUCCAL
Status: DISCONTINUED | OUTPATIENT
Start: 2020-03-02 | End: 2020-03-07 | Stop reason: HOSPADM

## 2020-03-02 RX ORDER — AZITHROMYCIN 250 MG/1
500 TABLET, FILM COATED ORAL
Status: DISCONTINUED | OUTPATIENT
Start: 2020-03-02 | End: 2020-03-03

## 2020-03-02 RX ORDER — MULTIVITAMIN,THERAPEUTIC
1 TABLET ORAL DAILY
Status: DISCONTINUED | OUTPATIENT
Start: 2020-03-02 | End: 2020-03-07 | Stop reason: HOSPADM

## 2020-03-02 RX ADMIN — ENOXAPARIN SODIUM 40 MG: 40 INJECTION SUBCUTANEOUS at 15:49

## 2020-03-02 RX ADMIN — POTASSIUM CHLORIDE 40 MEQ: 1500 TABLET, EXTENDED RELEASE ORAL at 17:03

## 2020-03-02 RX ADMIN — MAGNESIUM SULFATE HEPTAHYDRATE 2 G: 40 INJECTION, SOLUTION INTRAVENOUS at 20:00

## 2020-03-02 RX ADMIN — NYSTATIN 500000 UNITS: 100000 SUSPENSION ORAL at 08:40

## 2020-03-02 RX ADMIN — POTASSIUM & SODIUM PHOSPHATES POWDER PACK 280-160-250 MG 2 PACKET: 280-160-250 PACK at 03:43

## 2020-03-02 RX ADMIN — NICOTINE 1 PATCH: 21 PATCH TRANSDERMAL at 21:08

## 2020-03-02 RX ADMIN — Medication 10 ML: at 09:22

## 2020-03-02 RX ADMIN — IPRATROPIUM BROMIDE AND ALBUTEROL SULFATE 3 ML: .5; 3 SOLUTION RESPIRATORY (INHALATION) at 03:35

## 2020-03-02 RX ADMIN — LOPERAMIDE HYDROCHLORIDE 2 MG: 2 CAPSULE ORAL at 03:39

## 2020-03-02 RX ADMIN — FOLIC ACID 1 MG: 1 TABLET ORAL at 09:22

## 2020-03-02 RX ADMIN — Medication 10 ML: at 21:08

## 2020-03-02 RX ADMIN — POTASSIUM CHLORIDE 40 MEQ: 1500 TABLET, EXTENDED RELEASE ORAL at 09:01

## 2020-03-02 RX ADMIN — POTASSIUM CHLORIDE 40 MEQ: 1500 TABLET, EXTENDED RELEASE ORAL at 10:51

## 2020-03-02 RX ADMIN — Medication 10 ML: at 21:11

## 2020-03-02 RX ADMIN — INSULIN LISPRO 5 UNITS: 100 INJECTION, SOLUTION INTRAVENOUS; SUBCUTANEOUS at 16:49

## 2020-03-02 RX ADMIN — POTASSIUM CHLORIDE 40 MEQ: 1500 TABLET, EXTENDED RELEASE ORAL at 03:39

## 2020-03-02 RX ADMIN — INSULIN GLARGINE 15 UNITS: 100 INJECTION, SOLUTION SUBCUTANEOUS at 12:17

## 2020-03-02 RX ADMIN — SODIUM PHOSPHATE, MONOBASIC, MONOHYDRATE 40 MMOL: 276; 142 INJECTION, SOLUTION INTRAVENOUS at 10:50

## 2020-03-02 RX ADMIN — THERA TABS 1 TABLET: TAB at 09:22

## 2020-03-02 RX ADMIN — POTASSIUM CHLORIDE 40 MEQ: 1500 TABLET, EXTENDED RELEASE ORAL at 21:08

## 2020-03-02 RX ADMIN — INSULIN LISPRO 2 UNITS: 100 INJECTION, SOLUTION INTRAVENOUS; SUBCUTANEOUS at 16:50

## 2020-03-02 RX ADMIN — Medication 100 MG: at 09:22

## 2020-03-02 RX ADMIN — SODIUM CHLORIDE 750 ML: 0.9 INJECTION, SOLUTION INTRAVENOUS at 03:00

## 2020-03-02 RX ADMIN — AZITHROMYCIN MONOHYDRATE 500 MG: 250 TABLET ORAL at 12:21

## 2020-03-02 RX ADMIN — INSULIN LISPRO 5 UNITS: 100 INJECTION, SOLUTION INTRAVENOUS; SUBCUTANEOUS at 12:23

## 2020-03-02 RX ADMIN — SODIUM CHLORIDE, SODIUM LACTATE, POTASSIUM CHLORIDE, AND CALCIUM CHLORIDE 500 ML: 600; 310; 30; 20 INJECTION, SOLUTION INTRAVENOUS at 06:26

## 2020-03-02 RX ADMIN — Medication 10 ML: at 08:41

## 2020-03-02 RX ADMIN — MAGNESIUM SULFATE HEPTAHYDRATE 2 G: 40 INJECTION, SOLUTION INTRAVENOUS at 22:52

## 2020-03-02 RX ADMIN — IPRATROPIUM BROMIDE AND ALBUTEROL SULFATE 3 ML: .5; 3 SOLUTION RESPIRATORY (INHALATION) at 20:19

## 2020-03-02 RX ADMIN — SODIUM CHLORIDE AND POTASSIUM CHLORIDE 150 ML/HR: 9; 2.98 INJECTION, SOLUTION INTRAVENOUS at 05:48

## 2020-03-02 RX ADMIN — MAGNESIUM SULFATE HEPTAHYDRATE 2 G: 40 INJECTION, SOLUTION INTRAVENOUS at 17:04

## 2020-03-02 NOTE — THERAPY EVALUATION
Patient Name: Smita Steele  : 1965    MRN: [id number]                              Today's Date: 3/2/2020       Admit Date: 3/1/2020    Visit Dx:     ICD-10-CM ICD-9-CM   1. Hypokalemia, inadequate intake E87.6 276.8   2. Non-intractable vomiting with nausea, unspecified vomiting type R11.2 787.01   3. Diarrhea, unspecified type R19.7 787.91   4. Dehydration E86.0 276.51   5. Hyperglycemia R73.9 790.29   6. Oral thrush B37.0 112.0     Patient Active Problem List   Diagnosis   • Hypokalemia, inadequate intake   • Hyperglycemia   • Tobacco abuse   • Alcoholism /alcohol abuse (CMS/HCC)   • Hyponatremia   • Transaminitis   • Insomnia   • Hypophosphatemia   • Starvation ketoacidosis     Past Medical History:   Diagnosis Date   • Lupus (CMS/HCC)      Past Surgical History:   Procedure Laterality Date   •  SECTION     • CHOLECYSTECTOMY     • HYSTERECTOMY       General Information     Row Name 20 1440          PT Evaluation Time/Intention    Document Type  evaluation  -HD     Mode of Treatment  physical therapy  -HD     Row Name 20 1440          General Information    Patient Profile Reviewed?  yes  -HD     Prior Level of Function  independent: Pt reports recent weakness with several falls and inability to navigate steps to bedroom.   -HD     Existing Precautions/Restrictions  fall  -HD     Row Name 20 1440          Relationship/Environment    Lives With  child(felicia), adult daughter works full time and pt has been home alone for long periods of time  -HD     Row Name 20 1440          Resource/Environmental Concerns    Current Living Arrangements  home/apartment/condo  -HD     Row Name 20 1440          Stairs Within Home, Primary    Stairs, Within Home, Primary  -- flight of steps to bedroom, recently pt has been sleeping on couch on main floor  -HD     Row Name 20 1440          Cognitive Assessment/Intervention- PT/OT    Orientation Status (Cognition)  oriented x 3   -HD     Row Name 03/02/20 1440          Safety Issues, Functional Mobility    Safety Issues Affecting Function (Mobility)  insight into deficits/self awareness;safety precaution awareness  -HD     Impairments Affecting Function (Mobility)  balance;endurance/activity tolerance;strength;postural/trunk control  -HD       User Key  (r) = Recorded By, (t) = Taken By, (c) = Cosigned By    Initials Name Provider Type     Gianna Gold, PT Physical Therapist        Mobility     Row Name 03/02/20 1441          Bed Mobility Assessment/Treatment    Bed Mobility Assessment/Treatment  supine-sit;sit-supine  -HD     Supine-Sit [place] (Bed Mobility)  moderate assist (50% patient effort)  -HD     Sit-Supine [place] (Bed Mobility)  moderate assist (50% patient effort) for BLE mgmt due to weakness  -HD     Row Name 03/02/20 1441          Sit-Stand Transfer    Sit-Stand [place] (Transfers)  minimum assist (75% patient effort)  -HD     Assistive Device (Sit-Stand Transfers)  walker, front-wheeled  -HD     Row Name 03/02/20 1441          Gait/Stairs Assessment/Training    Gait/Stairs Assessment/Training  gait/ambulation independence;gait/ambulation assistive device  -HD     [place] Level (Gait)  minimum assist (75% patient effort)  -HD     Assistive Device (Gait)  walker, front-wheeled  -HD     Distance in Feet (Gait)  2x 12 ft   -HD     Comment (Gait/Stairs)  slow padmini, impaired balance, decreased step length  -HD       User Key  (r) = Recorded By, (t) = Taken By, (c) = Cosigned By    Initials Name Provider Type     Gianna Gold, PT Physical Therapist        Obj/Interventions     Row Name 03/02/20 1442          General ROM    GENERAL ROM COMMENTS  BLEs WFL  -HD     Row Name 03/02/20 1442          MMT (Manual Muscle Testing)    General MMT Comments  bilateral ankles 3+/5 MMT, Bilateral hips 3-/5 MMT, bilateral knees 3+/5 MMT; muscle atrophy noted in BLEs  -HD     Row Name 03/02/20 1442          Static  Sitting Balance    Level of [place] (Unsupported Sitting, Static Balance)  supervision  -HD     Sitting Position (Unsupported Sitting, Static Balance)  sitting on edge of bed  -HD     Time Able to Maintain Position (Unsupported Sitting, Static Balance)  1 to 2 minutes  -HD     Row Name 03/02/20 1442          Dynamic Sitting Balance    Level of [place], Reaches Outside Midline (Sitting, Dynamic Balance)  contact guard assist  -HD     Sitting Position, Reaches Outside Midline (Sitting, Dynamic Balance)  sitting on edge of bed  -HD     Row Name 03/02/20 1442          Static Standing Balance    Level of [place] (Supported Standing, Static Balance)  minimal assist, 75% patient effort  -HD     Time Able to Maintain Position (Supported Standing, Static Balance)  2 to 3 minutes  -HD     Assistive Device Utilized (Supported Standing, Static Balance)  walker, rolling  -HD     Row Name 03/02/20 1442          Dynamic Standing Balance    Level of [place], Reaches Outside Midline (Standing, Dynamic Balance)  minimal assist, 75% patient effort  -HD     Time Able to Maintain Position, Reaches Outside Midline (Standing, Dynamic Balance)  1 to 2 minutes  -HD     Assistive Device Utilized (Supported Standing, Dynamic Balance)  walker, rolling  -HD       User Key  (r) = Recorded By, (t) = Taken By, (c) = Cosigned By    Initials Name Provider Type    HD Gianna Gold, PT Physical Therapist        Goals/Plan     Row Name 03/02/20 1446          Bed Mobility Goal 1 (PT)    Activity/Assistive Device (Bed Mobility Goal 1, PT)  bed mobility activities, all  -HD     [place] Level/Cues Needed (Bed Mobility Goal 1, PT)  independent  -HD     Time Frame (Bed Mobility Goal 1, PT)  2 weeks  -HD     Row Name 03/02/20 1446          Transfer Goal 1 (PT)    Activity/Assistive Device (Transfer Goal 1, PT)  transfers, all  -HD     [place] Level/Cues Needed (Transfer Goal 1, PT)  independent  -HD     Time Frame (Transfer Goal  1, PT)  2 weeks  -HD     Row Name 03/02/20 1446          Gait Training Goal 1 (PT)    Activity/Assistive Device (Gait Training Goal 1, PT)  gait (walking locomotion);assistive device use  -HD     [place] Level (Gait Training Goal 1, PT)  contact guard assist  -HD     Distance (Gait Goal 1, PT)  100 ft  -HD     Time Frame (Gait Training Goal 1, PT)  2 weeks  -HD       User Key  (r) = Recorded By, (t) = Taken By, (c) = Cosigned By    Initials Name Provider Type    Gianna Gifford, PT Physical Therapist        Clinical Impression     Row Name 03/02/20 1443          Pain Assessment    Additional Documentation  Pain Scale: Numbers Pre/Post-Treatment (Group)  -HD     Row Name 03/02/20 1443          Pain Scale: Numbers Pre/Post-Treatment    Pain Scale: Numbers, Pretreatment  0/10 - no pain  -HD     Pain Scale: Numbers, Post-Treatment  0/10 - no pain  -HD     Row Name 03/02/20 1443          Physical Therapy Clinical Impression    Patient/Family Goals Statement (PT Clinical Impression)  Pt is 55 yo female admitted for weakness, diarrhea, dehydration, falls, hypokalemia, hyperglycemia.  Pt d/o DM.   Pt has hx of ETOH.   -HD     Criteria for Skilled Interventions Met (PT Clinical Impression)  yes;treatment indicated  -HD     Rehab Potential (PT Clinical Summary)  fair, will monitor progress closely  -HD     Row Name 03/02/20 1443          Positioning and Restraints    Pre-Treatment Position  in bed  -HD     Post Treatment Position  bed  -HD     In Bed  notified nsg;encouraged to call for assist;call light within reach;exit alarm on  -HD       User Key  (r) = Recorded By, (t) = Taken By, (c) = Cosigned By    Initials Name Provider Type    Gianna Gifford, PT Physical Therapist        Outcome Measures     Row Name 03/02/20 1440          How much help from another person do you currently need...    Turning from your back to your side while in flat bed without using bedrails?  2  -HD     Moving from lying on back to  sitting on the side of a flat bed without bedrails?  2  -HD     Moving to and from a bed to a chair (including a wheelchair)?  3  -HD     Standing up from a chair using your arms (e.g., wheelchair, bedside chair)?  3  -HD     Climbing 3-5 steps with a railing?  1  -HD     To walk in hospital room?  3  -HD     AM-PAC 6 Clicks Score (PT)  14  -HD     Row Name 03/02/20 1446          Functional Assessment    Outcome Measure Options  AM-PAC 6 Clicks Basic Mobility (PT)  -HD       User Key  (r) = Recorded By, (t) = Taken By, (c) = Cosigned By    Initials Name Provider Type    HD Gianna Gold, PT Physical Therapist          PT Recommendation and Plan  Planned Therapy Interventions (PT Eval): bed mobility training, gait training, balance training, postural re-education, transfer training, neuromuscular re-education, patient/family education, strengthening, stair training  Outcome Summary/Treatment Plan (PT)  Anticipated Discharge Disposition (PT): inpatient rehabilitation facility  Plan of Care Reviewed With: patient  Outcome Summary: Pt is 53 yo female admitted for weakness, diarrhea, dehydration, falls, hypokalemia, hyperglycemia.  Pt d/o DM.   Pt has hx of ETOH. Pt reports several recents falls requiring assist of others to get upright.  Pt requiring Isidoro-modA for safety with bed mobility due to weakness.  Pt able to complete trnasfers with Isidoro and ambulate 2x12 ft with RW and Isidoro.  Pt with noted muscle atrophy in BLEs and easily fatigues with short distance ambulation.  Pt is not safe to return home alone no navigate a flight of steps to bedoom.  PT is recommending IP rehab to address deficits.  PT will follow daily while at [facility] and continue to assess d/c needs.     Time Calculation:   PT Charges     Row Name 03/02/20 9266             Time Calculation    Start Time  1351  -HD      Stop Time  1422  -HD      Time Calculation (min)  31 min  -HD      PT Received On  03/02/20  -HD      PT - Next Appointment  03/03/20   -HD      PT Goal Re-Cert Due Date  03/16/20  -HD         Time Calculation- PT    Total Timed Code Minutes- PT  15 minute(s)  -HD        User Key  (r) = Recorded By, (t) = Taken By, (c) = Cosigned By    Initials Name Provider Type    Gianna Gifford, PT Physical Therapist        Therapy Charges for Today     Code Description Service Date Service Provider Modifiers Qty    97075561376 HC PT EVAL MOD COMPLEXITY 2 3/2/2020 Gianna oGld, PT GP 1    87178809505 HC GAIT TRAINING EA 15 MIN 3/2/2020 Gianna Gold, PT GP 1          PT G-Codes  Outcome Measure Options: AM-PAC 6 Clicks Basic Mobility (PT)  AM-PAC 6 Clicks Score (PT): 14    Gianna Gold, YUE  3/2/2020

## 2020-03-02 NOTE — THERAPY EVALUATION
Acute Care - Occupational Therapy Initial Evaluation   [facility]     Patient Name: Smita Steele  : 1965  MRN: [id number]  Today's Date: 3/2/2020             Admit Date: 3/1/2020       ICD-10-CM ICD-9-CM   1. Hypokalemia, inadequate intake E87.6 276.8   2. Non-intractable vomiting with nausea, unspecified vomiting type R11.2 787.01   3. Diarrhea, unspecified type R19.7 787.91   4. Dehydration E86.0 276.51   5. Hyperglycemia R73.9 790.29   6. Oral thrush B37.0 112.0     Patient Active Problem List   Diagnosis   • Hypokalemia, inadequate intake   • Hyperglycemia   • Tobacco abuse   • Alcoholism /alcohol abuse (CMS/HCC)   • Hyponatremia   • Transaminitis   • Insomnia   • Hypophosphatemia   • Starvation ketoacidosis     Past Medical History:   Diagnosis Date   • Lupus (CMS/HCC)      Past Surgical History:   Procedure Laterality Date   •  SECTION     • CHOLECYSTECTOMY     • HYSTERECTOMY            OT ASSESSMENT FLOWSHEET (last 12 hours)      Occupational Therapy Evaluation     Row Name 20 1600                   OT Evaluation Time/Intention    Subjective Information  complains of;weakness  -MP        Document Type  evaluation  -MP        Mode of Treatment  occupational therapy  -MP           General Information    Patient Profile Reviewed?  yes  -MP        Prior Level of Function  independent:;ADL's  -MP        Existing Precautions/Restrictions  fall  -MP           Relationship/Environment    Lives With  child(felicia), adult  -MP        Family Caregiver if Needed  child(felicia), adult  -MP           Resource/Environmental Concerns    Current Living Arrangements  home/apartment/condo  -MP        Resource/Environmental Concerns  none  -MP           Cognitive Assessment/Intervention- PT/OT    Orientation Status (Cognition)  oriented x 3  -MP           Bed Mobility Assessment/Treatment    Supine-Sit [place] (Bed Mobility)  moderate assist (50% patient effort)  -MP        Sit-Supine [place] (Bed  Mobility)  moderate assist (50% patient effort)  -MP           Functional Mobility    Functional Mobility- Ind. Level  contact guard assist;1 person  -MP        Functional Mobility- Device  rolling walker  -MP           Transfer Assessment/Treatment    Transfer Assessment/Treatment  toilet transfer  -MP           Sit-Stand Transfer    Sit-Stand [place] (Transfers)  minimum assist (75% patient effort)  -MP           Toilet Transfer    Type (Toilet Transfer)  sit-stand;stand-sit  -MP        [place] Level (Toilet Transfer)  moderate assist (50% patient effort);1 person assist  -MP           ADL Assessment/Intervention    BADL Assessment/Intervention  toileting;lower body dressing  -MP           Lower Body Dressing Assessment/Training    Lower Body Dressing [place] Level  don;socks;set up;verbal cues  -MP        Lower Body Dressing Position  edge of bed sitting  -MP           Toileting Assessment/Training    [place] Level (Toileting)  toileting skills;supervision;set up  -MP        Assistive Devices (Toileting)  commode  -MP        Toileting Position  unsupported sitting  -MP           General ROM    GENERAL ROM COMMENTS  BUE WFL  -MP           MMT (Manual Muscle Testing)    General MMT Comments  BUE 4-/5  -MP           Positioning and Restraints    Pre-Treatment Position  in bed  -MP        Post Treatment Position  bed  -MP        In Bed  call light within reach;encouraged to call for assist;exit alarm on  -MP           Pain Scale: Numbers Pre/Post-Treatment    Pain Scale: Numbers, Pretreatment  0/10 - no pain  -MP        Pain Scale: Numbers, Post-Treatment  0/10 - no pain  -MP           Plan of Care Review    Plan of Care Reviewed With  patient  -MP        Progress  no change  -MP           Clinical Impression (OT)    Criteria for Skilled Therapeutic Interventions Met (OT Eval)  yes;treatment indicated  -MP        Rehab Potential (OT Eval)  good, to achieve stated therapy goals  -MP        Therapy  Frequency (OT Eval)  3 times/wk  -MP        Care Plan Review (OT)  evaluation/treatment results reviewed  -MP        Anticipated Discharge Disposition (OT)  inpatient rehabilitation facility  -MP           OT Goals    Bed Mobility Goal Selection (OT)  bed mobility, OT goal 1  -MP        Transfer Goal Selection (OT)  transfer, OT goal 1  -MP        Toileting Goal Selection (OT)  toileting, OT goal 1  -MP           Bed Mobility Goal 1 (OT)    Activity/Assistive Device (Bed Mobility Goal 1, OT)  sit to supine;supine to sit  -MP        [place] Level/Cues Needed (Bed Mobility Goal 1, OT)  contact guard assist;1 person assist  -MP        Time Frame (Bed Mobility Goal 1, OT)  long term goal (LTG);2 weeks  -MP           Transfer Goal 1 (OT)    Activity/Assistive Device (Transfer Goal 1, OT)  sit-to-stand/stand-to-sit;toilet  -MP        [place] Level/Cues Needed (Transfer Goal 1, OT)  contact guard assist;1 person assist  -MP        Time Frame (Transfer Goal 1, OT)  long term goal (LTG);2 weeks  -MP           Toileting Goal 1 (OT)    Activity/Device (Toileting Goal 1, OT)  toileting skills, all  -MP        [place] Level/Cues Needed (Toileting Goal 1, OT)  independent  -MP        Time Frame (Toileting Goal 1, OT)  long term goal (LTG);2 weeks  -MP          User Key  (r) = Recorded By, (t) = Taken By, (c) = Cosigned By    Initials Name Effective Dates    Kody Perry, OT 03/01/19 -                OT Recommendation and Plan  Outcome Summary/Treatment Plan (OT)  Anticipated Discharge Disposition (OT): inpatient rehabilitation facility  Therapy Frequency (OT Eval): 3 times/wk  Plan of Care Review  Plan of Care Reviewed With: patient  Plan of Care Reviewed With: patient  Outcome Summary: Pt. is 55 y/o female admit w/ weakness, dehydration, falls, hyperglycemia. Pt. w/ new d/o DM. Pt. reports several falls at home w/ progressive weakness x 1 month. Pt. ADL independent at baseline, requires moderate assist  for ADL transfers w/ setup assist for toileting activity. Pt. w/o 24 hour care/support, will require IP rehab at d/c to address aforementioned deficits. Will follow up w/ pt. 1-3x per week at [facility].        Time Calculation:   Time Calculation- OT     Row Name 03/02/20 1628 03/02/20 1251          Time Calculation- OT    OT Start Time  1335  -MP  --     OT Stop Time  1400  -MP  --     OT Time Calculation (min)  25 min  -MP  --     Total Timed Code Minutes- OT  10 minute(s)  -MP  --     OT Received On  03/02/20  -MP  --     OT - Next Appointment  03/04/20  -MP  03/03/20  -     OT Goal Re-Cert Due Date  03/16/20  -  --       User Key  (r) = Recorded By, (t) = Taken By, (c) = Cosigned By    Initials Name Provider Type    MP Kody Godinez OT Occupational Therapist        Therapy Charges for Today     Code Description Service Date Service Provider Modifiers Qty    [id number]  OT EVAL LOW COMPLEXITY 3 3/2/2020 Kody Godinez OT GO 1    [id number]  OT SELF CARE/MGMT/TRAIN EA 15 MIN 3/2/2020 Kody Godinez OT GO 1               Kody Godinez OT  3/2/2020

## 2020-03-02 NOTE — PROGRESS NOTES
Discharge Planning Assessment  [facility]     Patient Name: Smita Steele  MRN: [id number]  Today's Date: 3/2/2020    Admit Date: 3/1/2020    Discharge Needs Assessment     Row Name 03/02/20 1558       Living Environment    Lives With  child(felicia), adult    Name(s) of Who Lives With Patient  dtr    Current Living Arrangements  home/apartment/condo    Primary Care Provided by  self;child(felicia)    Provides Primary Care For  no one    Family Caregiver if Needed  child(felicia), adult    Able to Return to Prior Arrangements  no       Resource/Environmental Concerns    Resource/Environmental Concerns  none       Transition Planning    Patient/Family Anticipates Transition to  inpatient rehabilitation facility    Transportation Anticipated  family or friend will provide       Discharge Needs Assessment    Readmission Within the Last 30 Days  no previous admission in last 30 days    Concerns to be Addressed  no discharge needs identified;denies needs/concerns at this time    Equipment Currently Used at Home  none    Equipment Needed After Discharge  walker, rolling        Discharge Plan     Row Name 03/02/20 1558       Plan    Plan  PT/OT evals pending. Anticipate Rehab Needs. Choices pending.     Provided Post Acute Provider List?  Yes    Post Acute Provider List  Inpatient Rehab    Delivered To  Patient    Method of Delivery  In person    Patient/Family in Agreement with Plan  yes    Plan Comments  Pt reports being independent until approx 2 weeks ago. Now with significant weakness. Lives with dtr who works Full time and unable to assist as much as pt requires. Discussed possibility of IP rehab needs, both pt and dtr aggreeable. Pt states she was just qualified for presumptive eligibility. Informed that due to HPE, placement options may be limited.               Aicha Nevarez RN

## 2020-03-02 NOTE — PLAN OF CARE
Problem: Patient Care Overview  Goal: Plan of Care Review  Outcome: Ongoing (interventions implemented as appropriate)  Flowsheets (Taken 3/2/2020 6737)  Plan of Care Reviewed With: patient  Outcome Summary: Pt is 55 yo female admitted for weakness, diarrhea, dehydration, falls, hypokalemia, hyperglycemia.  Pt d/o DM.   Pt has hx of ETOH. Pt reports several recents falls requiring assist of others to get upright.  Pt requiring Isidoro-modA for safety with bed mobility due to weakness.  Pt able to complete transfers with Isidoro and ambulate 2x12 ft with RW and Isidoro.  Pt with noted muscle atrophy in BLEs and easily fatigues with short distance ambulation.  Pt is not safe to return home alone no navigate a flight of steps to bedoom.  PT is recommending IP rehab to address deficits.  PT will follow daily while at [facility] and continue to assess d/c needs.

## 2020-03-02 NOTE — CONSULTS
"Diabetes Education  Assessment/Teaching    Patient Name:  Smita Steele  YOB: 1965  MRN: [id number]  Admit Date:  3/1/2020      Assessment Date:  3/2/2020    Most Recent Value   General Information    Referral From:  Blood glucose, MD order [Adm bs 419 mg/dl. Pt newly diagnosed with DM. A1c has been ordered but has not been resulted yet. 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  avoiding high blood sugar, avoiding low blood sugar, diabetes complications, medications for diabetes, testing my blood sugar at home, understanding diabetes   Nutrition Information   Assessment Topics   Healthy Eating - Assessment  Needs education   Being Active - Assessment  Needs education   Taking Medication - Assessment  Needs education   Problem Solving - Assessment  Needs education   Reducing Risk - Assessment  Needs education   Monitoring - Assessment  Needs education   DM Goals   Monitoring - Goal  Today            Most Recent Value   DM Education Needs   Meter  Meter provided [Gave pt the Accuchek Guide bs meter and instructed pt in use of meter. She performed return demo correctly. ]   Meter Type  Accuchek   Blood Glucose Target Range  Discussed with pt her adm bs of 419. Reviewed diagnostic criteria for DM. Explained her A1c result has not come back yet. Discussed difference between type 1 and type 2.    Teaching Method  Explanation, Discussion, Demonstration, Teach back, Handouts   Patient Response  Verbalized understanding, Demonstrates adequately            Other Comments:  Discussed diabetes disease process. Discussed healthy bs range and " instructed pt in use of Accuchek Guide Me bs meter. Pt performed return demo correctly. Pt states feeling dizzy and requesting more education at later time. BS at 1030 348 mg/dl. Notified endocrinologist of bs reading and pt's need for insulin. Pt not on any DM med at this time. Left at bedside the Diabetes First Steps Booklet, One Plate Method for meal planning, A1c info sheet and log book. Will follow up when pt feeling better.         Electronically signed by:  Annabelle Espinal RN  03/02/20 10:52 AM

## 2020-03-02 NOTE — PLAN OF CARE
Pt hypotension has resolved. Orthostatic BP's normal. Pt positive e-coli of the stool and has been started on abx. Pt has gotten up to the bathroom with a walker multiple times today with no c/o dizziness. A1C 12.9. Pt is a newly diagnosed diabetic. Potassium and phos have been replaced today. Awaiting rechecks

## 2020-03-02 NOTE — PROGRESS NOTES
Social Work Assessment  [facility]     Patient Name: Smita Steele  MRN: [id number]  Today's Date: 3/2/2020    Admit Date: 3/1/2020    Psychosocial     Row Name 03/02/20 1516       Coping/Stress    Major Change/Loss/Stressor  financial    Coping/Stress Comments  Pt was screened by [employer] & found to be eligible for Medicaid (TGMI: $1040), screened for HPE (RID#: [id number]). Pt had reported going on medical leave two days prior. Pt doesn't have a PCP listed at this time & will need to be screened for potential needs.      ELADIA Baird    Phone: [phone]  Cell: [phone]  Fax: [phone]  [email or web address]

## 2020-03-02 NOTE — PROGRESS NOTES
"      [facility] Medicine Services Daily Progress Note      Hospitalist Team  LOS 0 days      Patient Care Team:  Provider, No Known as PCP - General    Patient Location: 2115/1      Subjective   Subjective     Chief Complaint / Subjective  Chief Complaint   Patient presents with   • Weakness - Generalized         Brief Synopsis of Hospital Course/HPI      The patient is a 54 y.o. female with past medical history of lupus, alcohol and tobacco abuse who presented to [facility] ED on 3/1/2020 complaining of 9 days of nausea, vomiting, diarrhea and weakness.    The patient has not seen a physician for 3 years and is originally from [place].  She admits to drinking alcohol daily.  The patient has been falling at home because of weakness and denies focal weakness or dizziness before falls.    Date::    3/2/20: Patient received IV fluid boluses for low BP overnight.  Has not seen a physician for 3 years. Originally from [place].  History of lupus.  Not on medications at home.  Afebrile.  Claims to have several days of diarrhea and started on azithromycin for E. coli positive stool.  Endocrinology consulted.      Review of Systems   All other systems reviewed and are negative.        Objective   Objective      Vital Signs  Temp:  [97.6 °F (36.4 °C)-98.9 °F (37.2 °C)] 97.6 °F (36.4 °C)  Heart Rate:  [] 108  Resp:  [12-20] 16  BP: ()/() 96/59  Oxygen Therapy  SpO2: 96 %  Pulse Oximetry Type: Continuous  Device (Oxygen Therapy): room air  Flow (L/min): 2  Flowsheet Rows      First Filed Value   Admission Height  157.5 cm (62\") Documented at 03/01/2020 1345   Admission Weight  47.8 kg (105 lb 6.1 oz) Documented at 03/01/2020 1345        Intake & Output (last 3 days)       02/28 0701 - 02/29 0700 02/29 0701 - 03/01 0700 03/01 0701 - 03/02 0700 03/02 0701 - 03/03 0700    P.O.   0 500    Total Intake(mL/kg)   0 (0) 500 (9.8)    Urine (mL/kg/hr)   1100 450 (0.7)    Stool   0 0    " Total Output   1100 450    Net   -1100 +50            Urine Unmeasured Occurrence    2 x    Stool Unmeasured Occurrence   3 x 4 x        Lines, Drains & Airways    Active LDAs     Name:   Placement date:   Placement time:   Site:   Days:    Peripheral IV 03/01/20 1429 Left Antecubital   03/01/20    1429    Antecubital   1    Peripheral IV 03/02/20 1200 Left;Posterior Hand   03/02/20    1200    Hand   less than 1                  Physical Exam:    Physical Exam   Constitutional: She is oriented to person, place, and time. She appears well-developed.   HENT:   Head: Normocephalic.   Mouth/Throat: Mucous membranes are dry.   Eyes: Pupils are equal, round, and reactive to light.   Neck: Trachea normal and full passive range of motion without pain.   Cardiovascular: Normal rate and regular rhythm.   Pulmonary/Chest: Effort normal and breath sounds normal.   Musculoskeletal:   Moves all extremities.   Lymphadenopathy:     She has no cervical adenopathy.   Neurological: She is alert and oriented to person, place, and time. No cranial nerve deficit.   Skin: Skin is warm and dry.   Psychiatric: She has a normal mood and affect. Her speech is normal and behavior is normal. Cognition and memory are normal.     Procedures: None      Results Review:     I reviewed the patient's new clinical results.      Lab Results (last 24 hours)     Procedure Component Value Units Date/Time    Vitamin B12 [[id number]]  (Normal) Collected:  03/02/20 0810    Specimen:  Blood Updated:  03/02/20 1702     Vitamin B-12 800 pg/mL     Narrative:       Results may be falsely increased if patient taking Biotin.      Basic Metabolic Panel [[id number]]  (Abnormal) Collected:  03/02/20 1607    Specimen:  Blood Updated:  03/02/20 1650     Glucose 227 mg/dL      BUN 6 mg/dL      Creatinine 0.38 mg/dL      Sodium 135 mmol/L      Potassium 3.3 mmol/L      Chloride 97 mmol/L      CO2 24.0 mmol/L      Calcium 7.7 mg/dL      eGFR Non African Amer >150 mL/min/1.73       BUN/Creatinine Ratio 15.8     Anion Gap 14.0 mmol/L     Narrative:       GFR Normal >60  Chronic Kidney Disease <60  Kidney Failure <15      Magnesium [[id number]]  (Abnormal) Collected:  03/02/20 1607    Specimen:  Blood Updated:  03/02/20 1648     Magnesium 1.5 mg/dL     POC Glucose Once [[id number]]  (Abnormal) Collected:  03/02/20 1632    Specimen:  Blood Updated:  03/02/20 1636     Glucose 204 mg/dL      Comment: Serial Number: [id number]:  [id number]       Urine Culture - Urine, Urine, Clean Catch [[id number]] Collected:  03/01/20 1530    Specimen:  Urine, Clean Catch Updated:  03/02/20 1634     Urine Culture >100,000 CFU/mL Mixed Emre Isolated    Narrative:       Specimen contains mixed organisms of questionable pathogenicity which indicates contamination with commensal emre.  Further identification is unlikely to provide clinically useful information.  Suggest recollection.    Hemoglobin A1c [[id number]]  (Abnormal) Collected:  03/02/20 0258    Specimen:  Blood Updated:  03/02/20 1548     Hemoglobin A1C 12.9 %     Narrative:       Hemoglobin A1C Reference Range:    <5.7 %        Normal  5.7-6.4 %     Increased risk for diabetes  > 6.4 %        Diabetes       These guidelines have been recommended by the American Diabetic Association for Hgb A1c.      The following 2010 guidelines have been recommended by the American Diabetes Association for Hemoglobin A1c.    HBA1c 5.7-6.4% Increased risk for future diabetes (pre-diabetes)  HBA1c     >6.4% Diabetes      POC Glucose Once [[id number]]  (Abnormal) Collected:  03/02/20 1157    Specimen:  Blood Updated:  03/02/20 1215     Glucose 389 mg/dL      Comment: Serial Number: [id number]:  [id number]       POC Glucose Once [[id number]]  (Abnormal) Collected:  03/02/20 1029    Specimen:  Blood Updated:  03/02/20 1031     Glucose 348 mg/dL      Comment: Serial Number: [id number]:  [id number]       Gastrointestinal Panel, PCR - Stool, Per Rectum  [[id number]]  (Abnormal) Collected:  03/02/20 0440    Specimen:  Stool from Per Rectum Updated:  03/02/20 0841     Campylobacter Not Detected     Plesiomonas shigelloides Not Detected     Salmonella Not Detected     Vibrio Not Detected     Vibrio cholerae Not Detected     Yersinia enterocolitica Not Detected     Enteroaggregative E. coli (EAEC) Detected     Enteropathogenic E. coli (EPEC) Not Detected     Enterotoxigenic E. coli (ETEC) lt/st Not Detected     Shiga-like toxin-producing E. coli (STEC) stx1/stx2 Not Detected     E. coli O157 Not Detected     Shigella/Enteroinvasive E. coli (EIEC) Not Detected     Cryptosporidium Not Detected     Cyclospora cayetanensis Not Detected     Entamoeba histolytica Not Detected     Giardia lamblia Not Detected     Adenovirus F40/41 Not Detected     Astrovirus Not Detected     Norovirus GI/GII Not Detected     Rotavirus A Not Detected     Sapovirus (I, II, IV or V) Not Detected    Narrative:       If Aeromonas, Staphylococcus aureus or Bacillus cereus are suspected, please order [id number]: Stool Culture, Aeromonas, S aureus, B Cereus.    POC Glucose Once [[id number]]  (Abnormal) Collected:  03/02/20 0805    Specimen:  Blood Updated:  03/02/20 0808     Glucose 202 mg/dL      Comment: Serial Number: [id number]:  [id number]       POC Glucose Once [[id number]]  (Abnormal) Collected:  03/02/20 0628    Specimen:  Blood Updated:  03/02/20 0630     Glucose 155 mg/dL      Comment: Serial Number: [id number]:  [id number]       Respiratory Panel, PCR - Swab, Nasopharynx [[id number]]  (Normal) Collected:  03/02/20 0434    Specimen:  Swab from Nasopharynx Updated:  03/02/20 0629     ADENOVIRUS, PCR Not Detected     Coronavirus 229E Not Detected     Coronavirus HKU1 Not Detected     Coronavirus NL63 Not Detected     Coronavirus OC43 Not Detected     Human Metapneumovirus Not Detected     Human Rhinovirus/Enterovirus Not Detected     Influenza B PCR Not Detected     Parainfluenza Virus 1  Not Detected     Parainfluenza Virus 2 Not Detected     Parainfluenza Virus 3 Not Detected     Parainfluenza Virus 4 Not Detected     Bordetella pertussis pcr Not Detected     Influenza A H1 2009 PCR Not Detected     Chlamydophila pneumoniae PCR Not Detected     Mycoplasma pneumo by PCR Not Detected     Influenza A PCR Not Detected     Influenza A H3 Not Detected     Influenza A H1 Not Detected     RSV, PCR Not Detected    Lactic Acid, Plasma [[id number]]  (Normal) Collected:  03/02/20 0528    Specimen:  Blood Updated:  03/02/20 0558     Lactate 1.3 mmol/L     D-dimer, Quantitative [[id number]]  (Normal) Collected:  03/02/20 0528    Specimen:  Blood Updated:  03/02/20 0550     D-Dimer, Quantitative 0.35 MCGFEU/mL     Narrative:       Reference Range  --------------------------------------------------------------------     < 0.50   Negative Predictive Value  0.50-0.59   Indeterminate    >= 0.60   Probable VTE             A very low percentage of patients with DVT may yield D-Dimer results   below the cut-off of 0.50 MCGFEU/mL.  This is known to be more   prevalent in patients with distal DVT.             Results of this test should always be interpreted in conjunction with   the patient's medical history, clinical presentation and other   findings.  Clinical diagnosis should not be based on the result of   INNOVANCE D-Dimer alone.    POC Glucose Once [[id number]]  (Abnormal) Collected:  03/02/20 0400    Specimen:  Blood Updated:  03/02/20 0401     Glucose 113 mg/dL      Comment: Serial Number: [id number]:  [id number]       Phosphorus [[id number]]  (Abnormal) Collected:  03/02/20 0258    Specimen:  Blood Updated:  03/02/20 0339     Phosphorus <0.3 mg/dL     BNP [[id number]]  (Abnormal) Collected:  03/02/20 0258    Specimen:  Blood Updated:  03/02/20 0331     proBNP 1,117.0 pg/mL     Narrative:       Among patients with dyspnea, NT-proBNP is highly sensitive for the detection of acute congestive heart failure. In  addition NT-proBNP of <300 pg/ml effectively rules out acute congestive heart failure with 99% negative predictive value.    Results may be falsely decreased if patient taking Biotin.      TSH [[id number]]  (Abnormal) Collected:  03/02/20 0258    Specimen:  Blood Updated:  03/02/20 0331     TSH 6.580 uIU/mL     Comprehensive Metabolic Panel [[id number]]  (Abnormal) Collected:  03/02/20 0258    Specimen:  Blood Updated:  03/02/20 0330     Glucose 141 mg/dL      BUN 7 mg/dL      Creatinine 0.48 mg/dL      Sodium 138 mmol/L      Potassium 2.2 mmol/L      Chloride 92 mmol/L      CO2 32.0 mmol/L      Calcium 7.6 mg/dL      Total Protein 4.5 g/dL      Albumin 2.80 g/dL      ALT (SGPT) 52 U/L      AST (SGOT) 55 U/L      Alkaline Phosphatase 121 U/L      Total Bilirubin 1.1 mg/dL      eGFR Non African Amer 134 mL/min/1.73      Globulin 1.7 gm/dL      A/G Ratio 1.6 g/dL      BUN/Creatinine Ratio 14.6     Anion Gap 14.0 mmol/L     Narrative:       GFR Normal >60  Chronic Kidney Disease <60  Kidney Failure <15      Lipid Panel [[id number]]  (Abnormal) Collected:  03/02/20 0258    Specimen:  Blood Updated:  03/02/20 0327     Total Cholesterol 98 mg/dL      Triglycerides 163 mg/dL      HDL Cholesterol 38 mg/dL      LDL Cholesterol  27 mg/dL      VLDL Cholesterol 32.6 mg/dL      LDL/HDL Ratio 0.72    Narrative:       Cholesterol Reference Ranges  (U.S. Department of Health and Human Services ATP III Classifications)    Desirable          <200 mg/dL  Borderline High    200-239 mg/dL  High Risk          >240 mg/dL      Triglyceride Reference Ranges  (U.S. Department of Health and Human Services ATP III Classifications)    Normal           <150 mg/dL  Borderline High  150-199 mg/dL  High             200-499 mg/dL  Very High        >500 mg/dL    HDL Reference Ranges  (U.S. Department of Health and Human Services ATP III Classifcations)    Low     <40 mg/dl (major risk factor for CHD)  High    >60 mg/dl ('negative' risk factor for  CHD)        LDL Reference Ranges  (U.S. Department of Health and Human Services ATP III Classifcations)    Optimal          <100 mg/dL  Near Optimal     100-129 mg/dL  Borderline High  130-159 mg/dL  High             160-189 mg/dL  Very High        >189 mg/dL    CK [[id number]]  (Normal) Collected:  03/02/20 0258    Specimen:  Blood Updated:  03/02/20 0327     Creatine Kinase 41 U/L     Magnesium [[id number]]  (Normal) Collected:  03/02/20 0258    Specimen:  Blood Updated:  03/02/20 0327     Magnesium 1.8 mg/dL     CBC Auto Differential [[id number]]  (Abnormal) Collected:  03/02/20 0258    Specimen:  Blood Updated:  03/02/20 0311     WBC 6.10 10*3/mm3      RBC 3.16 10*6/mm3      Hemoglobin 11.3 g/dL      Comment: Result checked         Hematocrit 31.9 %      .9 fL      MCH 35.7 pg      MCHC 35.4 g/dL      RDW 12.2 %      RDW-SD 43.3 fl      MPV 9.7 fL      Platelets 202 10*3/mm3      Neutrophil % 70.9 %      Lymphocyte % 19.4 %      Monocyte % 8.8 %      Eosinophil % 0.4 %      Basophil % 0.5 %      Neutrophils, Absolute 4.30 10*3/mm3      Lymphocytes, Absolute 1.20 10*3/mm3      Monocytes, Absolute 0.50 10*3/mm3      Eosinophils, Absolute 0.00 10*3/mm3      Basophils, Absolute 0.00 10*3/mm3      nRBC 0.2 /100 WBC     POC Glucose Once [[id number]]  (Abnormal) Collected:  03/02/20 0239    Specimen:  Blood Updated:  03/02/20 0240     Glucose 138 mg/dL      Comment: Serial Number: [id number]:  [id number]       Acetone, Urine, Qualitative - Urine, Clean Catch [[id number]]  (Abnormal) Collected:  03/02/20 0225    Specimen:  Urine, Clean Catch Updated:  03/02/20 0232     Ketones, UA 80 mg/dL (3+)    POC Glucose Once [[id number]]  (Abnormal) Collected:  03/02/20 0208    Specimen:  Blood Updated:  03/02/20 0209     Glucose 156 mg/dL      Comment: Serial Number: [id number]:  [id number]       POC Glucose Once [[id number]]  (Abnormal) Collected:  03/02/20 0005    Specimen:  Blood Updated:  03/02/20 0007     Glucose  301 mg/dL      Comment: Serial Number: [id number]:  [id number]       Basic Metabolic Panel [[id number]]  (Abnormal) Collected:  03/01/20 2056    Specimen:  Blood Updated:  03/01/20 2335     Glucose 325 mg/dL      BUN 9 mg/dL      Creatinine 0.50 mg/dL      Sodium 127 mmol/L      Potassium 2.3 mmol/L      Chloride 80 mmol/L      CO2 23.0 mmol/L      Calcium 6.9 mg/dL      eGFR Non African Amer 128 mL/min/1.73      BUN/Creatinine Ratio 18.0     Anion Gap 24.0 mmol/L     Narrative:       GFR Normal >60  Chronic Kidney Disease <60  Kidney Failure <15      POC Glucose Once [[id number]]  (Abnormal) Collected:  03/01/20 2245    Specimen:  Blood Updated:  03/01/20 2247     Glucose 333 mg/dL      Comment: Serial Number: [id number]:  [id number]       Acetone, Urine, Qualitative - Urine, Clean Catch [[id number]]  (Abnormal) Collected:  03/01/20 2232    Specimen:  Urine, Clean Catch Updated:  03/01/20 2240     Ketones, UA 80 mg/dL (3+)    Potassium [[id number]]  (Abnormal) Collected:  03/01/20 2056    Specimen:  Blood Updated:  03/01/20 2145     Potassium 2.3 mmol/L     Lipase [[id number]]  (Normal) Collected:  03/01/20 2056    Specimen:  Blood Updated:  03/01/20 2142     Lipase 39 U/L     POC Glucose Once [[id number]]  (Abnormal) Collected:  03/01/20 2103    Specimen:  Blood Updated:  03/01/20 2104     Glucose 322 mg/dL      Comment: Serial Number: [id number]:  [id number]           Hemoglobin A1C   Date Value Ref Range Status   03/02/2020 12.9 (H) 3.5 - 5.6 % Final     Results from last 7 days   Lab Units 03/01/20  1432   INR  0.98           Lab Results   Component Value Date    LIPASE 39 03/01/2020     Lab Results   Component Value Date    CHOL 98 03/02/2020    TRIG 163 (H) 03/02/2020    HDL 38 (L) 03/02/2020    LDL 27 03/02/2020       No results found for: INTRAOP, PREDX, FINALDX, COMDX    Microbiology Results (last 10 days)     Procedure Component Value - Date/Time    Gastrointestinal Panel, PCR - Stool, Per  Rectum [[id number]]  (Abnormal) Collected:  03/02/20 0440    Lab Status:  Final result Specimen:  Stool from Per Rectum Updated:  03/02/20 0841     Campylobacter Not Detected     Plesiomonas shigelloides Not Detected     Salmonella Not Detected     Vibrio Not Detected     Vibrio cholerae Not Detected     Yersinia enterocolitica Not Detected     Enteroaggregative E. coli (EAEC) Detected     Enteropathogenic E. coli (EPEC) Not Detected     Enterotoxigenic E. coli (ETEC) lt/st Not Detected     Shiga-like toxin-producing E. coli (STEC) stx1/stx2 Not Detected     E. coli O157 Not Detected     Shigella/Enteroinvasive E. coli (EIEC) Not Detected     Cryptosporidium Not Detected     Cyclospora cayetanensis Not Detected     Entamoeba histolytica Not Detected     Giardia lamblia Not Detected     Adenovirus F40/41 Not Detected     Astrovirus Not Detected     Norovirus GI/GII Not Detected     Rotavirus A Not Detected     Sapovirus (I, II, IV or V) Not Detected    Narrative:       If Aeromonas, Staphylococcus aureus or Bacillus cereus are suspected, please order [id number]: Stool Culture, Aeromonas, S aureus, B Cereus.    Respiratory Panel, PCR - Swab, Nasopharynx [[id number]]  (Normal) Collected:  03/02/20 0434    Lab Status:  Final result Specimen:  Swab from Nasopharynx Updated:  03/02/20 0629     ADENOVIRUS, PCR Not Detected     Coronavirus 229E Not Detected     Coronavirus HKU1 Not Detected     Coronavirus NL63 Not Detected     Coronavirus OC43 Not Detected     Human Metapneumovirus Not Detected     Human Rhinovirus/Enterovirus Not Detected     Influenza B PCR Not Detected     Parainfluenza Virus 1 Not Detected     Parainfluenza Virus 2 Not Detected     Parainfluenza Virus 3 Not Detected     Parainfluenza Virus 4 Not Detected     Bordetella pertussis pcr Not Detected     Influenza A H1 2009 PCR Not Detected     Chlamydophila pneumoniae PCR Not Detected     Mycoplasma pneumo by PCR Not Detected     Influenza A PCR Not Detected      Influenza A H3 Not Detected     Influenza A H1 Not Detected     RSV, PCR Not Detected    Urine Culture - Urine, Urine, Clean Catch [[id number]] Collected:  03/01/20 1530    Lab Status:  Final result Specimen:  Urine, Clean Catch Updated:  03/02/20 1634     Urine Culture >100,000 CFU/mL Mixed Emre Isolated    Narrative:       Specimen contains mixed organisms of questionable pathogenicity which indicates contamination with commensal emre.  Further identification is unlikely to provide clinically useful information.  Suggest recollection.          ECG/EMG Results (most recent)     Procedure Component Value Units Date/Time    ECG 12 Lead [[id number]]  (Abnormal) Resulted:  03/01/20 1624     Updated:  03/01/20 1624    ECG 12 Lead [[id number]] Collected:  03/02/20 0301     Updated:  03/02/20 0302    Narrative:       HEART RATE= 94  bpm  RR Interval= 636  ms  NV Interval= 130  ms  P Horizontal Axis= -10  deg  P Front Axis= 77  deg  QRSD Interval= 85  ms  QT Interval= 431  ms  QRS Axis= 82  deg  T Wave Axis= 265  deg  - ABNORMAL ECG -  Sinus rhythm  Repol abnrm suggests ischemia, diffuse leads  Prolonged QT interval  Electronically Signed By:   Date and Time of Study: 2020-03-02 03:01:10    ECG 12 Lead [[id number]] Collected:  03/01/20 1405     Updated:  03/02/20 0726    Narrative:       HEART RATE= 93  bpm  RR Interval= 644  ms  NV Interval= 136  ms  P Horizontal Axis= -4  deg  P Front Axis= 68  deg  QRSD Interval= 84  ms  QT Interval= 393  ms  QRS Axis= 75  deg  T Wave Axis= 262  deg  - ABNORMAL ECG -  Sinus rhythm  LAE, consider biatrial enlargement  Repol abnrm suggests ischemia, diffuse leads  Electronically Signed By: Berlin Smith (David) 02-Mar-2020 07:26:06  Date and Time of Study: 2020-03-01 14:05:28                    Us Gallbladder    Result Date: 3/2/2020  1. Cholecystectomy. Normal caliber of the biliary tree. 2. Severe hepatic steatosis. 3. The right kidney shows no evidence of shadowing stone or  hydronephrosis. Electronically signed by:  Mike Valdes M.D.  3/2/2020 1:11 AM    Xr Chest 1 View    Result Date: 3/2/2020  Impression: 1. Prominence of bronchopulmonary markings which can be seen in viral airway disease.  Please correlate for bronchitis. 2. No focal infiltrate. Electronically signed by:  Marsha Omalley M.D.  3/2/2020 1:47 AM          Xrays, labs reviewed personally by physician.    Medication Review:   I have reviewed the patient's current medication list      Scheduled Meds    azithromycin 500 mg Oral Q24H   enoxaparin 40 mg Subcutaneous Q24H   folic acid 1 mg Oral Daily   insulin glargine 15 Units Subcutaneous QAM   insulin lispro 0-7 Units Subcutaneous 4x Daily With Meals & Nightly   insulin lispro 5 Units Subcutaneous TID With Meals   nicotine 1 patch Transdermal Nightly   sodium chloride 1,000 mL Intravenous Once   sodium chloride 10 mL Intravenous Q12H   sodium chloride 10 mL Intravenous Q12H   THERA 1 tablet Oral Daily   thiamine 100 mg Oral Daily       Meds Infusions    sodium chloride 100 mL/hr Last Rate: 100 mL/hr (03/02/20 0924)   sodium chloride 0.9 % with KCl 40 mEq/L 150 mL/hr Last Rate: 150 mL/hr (03/02/20 0902)       Meds PRN  calcium carbonate  •  Calcium Gluconate-NaCl **AND** calcium gluconate **AND** Calcium, Ionized  •  dextrose  •  dextrose  •  docusate sodium  •  glucagon (human recombinant)  •  ipratropium-albuterol  •  loperamide  •  LORazepam **OR** LORazepam **OR** LORazepam **OR** LORazepam **OR** LORazepam **OR** LORazepam  •  magnesium sulfate **OR** magnesium sulfate **OR** magnesium sulfate  •  melatonin  •  nitroglycerin  •  ondansetron **OR** ondansetron  •  potassium & sodium phosphates **OR** potassium & sodium phosphates  •  potassium chloride  •  potassium chloride  •  potassium chloride  •  [COMPLETED] Insert peripheral IV **AND** sodium chloride  •  sodium chloride  •  sodium chloride      Assessment/Plan   Assessment/Plan     Active Hospital Problems     Diagnosis  POA   • **Hyperglycemia [R73.9]  Yes     Priority: High   • Hypophosphatemia [E83.39]  Yes     Priority: High   • Starvation ketoacidosis [E87.2]  Yes     Priority: High   • Hypokalemia, inadequate intake [E87.6]  Yes     Priority: High   • Alcoholism /alcohol abuse (CMS/HCC) [F10.20]  Yes     Priority: High   • Hyponatremia [E87.1]  Yes     Priority: High   • Transaminitis [R74.0]  Yes     Priority: Medium   • Type 2 diabetes mellitus (CMS/HCC) [E11.9]  Yes   • Tobacco abuse [Z72.0]  Unknown   • Insomnia [G47.00]  Yes      Resolved Hospital Problems   No resolved problems to display.       MEDICAL DECISION MAKING COMPLEXITY BY PROBLEM:     1.  New onset diabetes mellitus:  -Hemoglobin A1c 12.9  -Continue ISS and IV fluid resuscitation  -Endocrinology and diabetic nurse educator consulted     2.  Infectious diarrhea:  -Started on azithromycin  -Continue antiemetics    3. Hypokalemia and hypophosphatemia:  -Replacement protocol ordered  -Consult nutrition for calorie count     4.  Alcohol abuse  -CIWA protocol ordered  -Case management consult placed     5.  Tobacco abuse  -Nicotine patch ordered    6.  Lupus:  -Has not been on Plaquenil for 3 years           VTE Prophylaxis -   Mechanical Order History:     None      Pharmalogical Order History:     Ordered     Dose Route Frequency Stop    03/02/20 0821  enoxaparin (LOVENOX) syringe 40 mg      40 mg SC Every 24 Hours --    03/01/20 2030  enoxaparin (LOVENOX) syringe 30 mg  Status:  Discontinued      30 mg SC Daily 03/02/20 0835            Code Status -   Code Status and Medical Interventions:   Ordered at: 03/01/20 1654     Level Of Support Discussed With:    Patient     Code Status:    CPR     Medical Interventions (Level of Support Prior to Arrest):    Full       Discharge Planning      SLP OP Goals     Row Name 03/02/20 1451          Time Calculation    PT Goal Re-Cert Due Date  03/16/20  -HD       User Key  (r) = Recorded By, (t) = Taken By, (c) =  Cosigned By    Initials Name Provider Type    HD Gianna Gold, PT Physical Therapist          Destination      Coordination has not been started for this encounter.      Durable Medical Equipment      Coordination has not been started for this encounter.      Dialysis/Infusion      Coordination has not been started for this encounter.      Home Medical Care      Coordination has not been started for this encounter.      Therapy      Coordination has not been started for this encounter.      Community Resources      Coordination has not been started for this encounter.            Electronically signed by Paddy Stout DO, 03/02/20, 6:53 PM.  [facility] Hospitalist Team

## 2020-03-02 NOTE — PLAN OF CARE
Problem: Patient Care Overview  Goal: Plan of Care Review  Outcome: Ongoing (interventions implemented as appropriate)  Flowsheets  Taken 3/2/2020 1600  Progress: no change  Plan of Care Reviewed With: patient  Taken 3/2/2020 1626  Outcome Summary: Pt. is 53 y/o female admit w/ weakness, dehydration, falls, hyperglycemia. Pt. w/ new d/o DM. Pt. reports several falls at home w/ progressive weakness x 1 month. Pt. ADL independent at baseline, requires moderate assist for ADL transfers w/ setup assist for toileting activity. Pt. w/o 24 hour care/support, will require IP rehab at d/c to address aforementioned deficits. Will follow up w/ pt. 1-3x per week at [facility].

## 2020-03-02 NOTE — H&P
"      [facility] Medicine Services      Patient Name: Smita Steele  : 1965  MRN: [id number]  Primary Care Physician: Beronica, No Known  Date of admission: 3/1/2020    Patient Care Team:  Provider, No Known as PCP - General          Subjective   History Present Illness     Chief Complaint:   Chief Complaint   Patient presents with   • Weakness - Generalized       Ms. Steele is a 54 y.o. female with past medical history of lupus, alcohol and tobacco abuse who presents to [facility] complaining of nausea, vomiting, diarrhea and weakness for the past 9 days.  Patient states that over that time.  She has had increasing vomiting and diarrhea and noticed herself having more difficulty walking about her home stating that her \"legs buckle when she tries to move from room to room\".  She has had several falls however she states she has had no loss of consciousness and has not hit her head she is able to catch her self whenever she goes to the ground.  She denies any specific pain including chest pain, no shortness of air, cough, fevers or recent sick contacts.  She does not report any history of diabetes or previous episodes of hypoglycemia.  Polydipsia is reported without any polyuria or polyphagia.  Patient does state that for approximately the past 18 months following the death of her father she is drank 4 shots of vodka a day and continues to be 1/2 pack a day smoker.    In the ED patient found to have labs significant for troponin of less than 0.010, proBNP: 2043.0, glucose: 419, sodium: 124, potassium: 1.6, anion gap: 29.0, CO2: 24.0, alk phos: 155, ALT: 60, AST: 52.  CBC noted with an MCV of 101.1 and an MCHC of 36.5 hemoglobin: 14.6.  Otherwise unremarkable.  EKG shows sinus rhythm at 93 with flattened T waves and a QTC of 490 ms.    History of Present Illness    Review of Systems   Constitution: Positive for decreased appetite and malaise/fatigue.   HENT: Negative.    Eyes: " Negative.    Cardiovascular: Negative.    Respiratory: Negative.    Endocrine: Positive for polydipsia. Negative for polyuria.   Skin: Negative.    Musculoskeletal: Negative.    Gastrointestinal: Positive for diarrhea, nausea and vomiting.   Genitourinary: Negative.    Neurological: Positive for weakness.   Psychiatric/Behavioral: Negative.            Personal History     Past Medical History:   Past Medical History:   Diagnosis Date   • Lupus (CMS/HCC)        Surgical History:      Past Surgical History:   Procedure Laterality Date   •  SECTION     • CHOLECYSTECTOMY     • HYSTERECTOMY             Family History: family history is not on file. Otherwise pertinent FHx was reviewed and unremarkable.     Social History:  reports that she has been smoking. She has been smoking about 0.50 packs per day. She does not have any smokeless tobacco history on file. She reports that she drinks about 4.0 standard drinks of alcohol per week. She reports that she does not use drugs.      Medications:  Prior to Admission medications    Medication Sig Start Date End Date Taking? Authorizing Provider   ibuprofen (ADVIL,MOTRIN) 200 MG tablet Take 200 mg by mouth Every 6 (Six) Hours As Needed for Mild Pain .   Yes Provider, Historical, MD       Allergies:    Allergies   Allergen Reactions   • Penicillins Unknown - High Severity       Objective   Objective     Vital Signs  Temp:  [98.3 °F (36.8 °C)] 98.3 °F (36.8 °C)  Heart Rate:  [] 109  Resp:  [18] 18  BP: ()/(54-79) 107/79  SpO2:  [95 %-100 %] 99 %  on   ;      Body mass index is 19.27 kg/m².    Physical Exam   Constitutional: She is oriented to person, place, and time. She appears well-developed and well-nourished. No distress.   HENT:   Head: Normocephalic and atraumatic.   Right Ear: External ear normal.   Left Ear: External ear normal.   Nose: Nose normal.   Eyes: Conjunctivae and EOM are normal.   Neck: Normal range of motion.   Cardiovascular: Normal rate,  regular rhythm, normal heart sounds and intact distal pulses.   Pulmonary/Chest: Effort normal and breath sounds normal.   Abdominal: Soft. Bowel sounds are normal.   Musculoskeletal: Normal range of motion.   Neurological: She is alert and oriented to person, place, and time.   Skin: Skin is warm and dry.   Psychiatric: She has a normal mood and affect. Her behavior is normal. Judgment and thought content normal.   Vitals reviewed.      Results Review:  I have personally reviewed most recent cardiac tracings and lab results and agree with findings, most notably: proBNP, glucose, sodium, potassium, alk phos, liver function and EKG.    Results from last 7 days   Lab Units 03/01/20  1433 03/01/20  1432   WBC 10*3/mm3 7.00  --    HEMOGLOBIN g/dL 14.6  --    HEMATOCRIT % 40.0  --    PLATELETS 10*3/mm3 230  --    INR   --  0.98     Results from last 7 days   Lab Units 03/01/20  2056 03/01/20  1532  03/01/20  1433   SODIUM mmol/L  --  124*  --   --    POTASSIUM mmol/L 2.3* 1.6*   < >  --    CHLORIDE mmol/L  --  71*  --   --    CO2 mmol/L  --  24.0  --   --    BUN mg/dL  --  12  --   --    CREATININE mg/dL  --  0.69  --   --    GLUCOSE mg/dL  --  419*  --   --    CALCIUM mg/dL  --  7.3*  --   --    ALT (SGPT) U/L  --  60*  --   --    AST (SGOT) U/L  --  52*  --   --    TROPONIN T ng/mL  --   --   --  <0.010   PROBNP pg/mL  --   --   --  2,043.0*    < > = values in this interval not displayed.     Estimated Creatinine Clearance: 69.5 mL/min (by C-G formula based on SCr of 0.69 mg/dL).  Brief Urine Lab Results  (Last result in the past 365 days)      Color   Clarity   Blood   Leuk Est   Nitrite   Protein   CREAT   Urine HCG        03/01/20 1530 Yellow Cloudy  Comment:  Result checked  Trace Small (1+) Negative 30 mg/dL (1+)               Microbiology Results (last 10 days)     ** No results found for the last 240 hours. **          ECG/EMG Results (most recent)     Procedure Component Value Units Date/Time    ECG 12 Lead  [[id number]] Collected:  03/01/20 1405     Updated:  03/01/20 1407    Narrative:       HEART RATE= 93  bpm  RR Interval= 644  ms  IA Interval= 136  ms  P Horizontal Axis= -4  deg  P Front Axis= 68  deg  QRSD Interval= 84  ms  QT Interval= 393  ms  QRS Axis= 75  deg  T Wave Axis= 262  deg  - ABNORMAL ECG -  Sinus rhythm  LAE, consider biatrial enlargement  Repol abnrm suggests ischemia, diffuse leads  Electronically Signed By:   Date and Time of Study: 2020-03-01 14:05:28    ECG 12 Lead [[id number]]  (Abnormal) Resulted:  03/01/20 1624     Updated:  03/01/20 1624                    No radiology results for the last 7 days      Estimated Creatinine Clearance: 69.5 mL/min (by C-G formula based on SCr of 0.69 mg/dL).    Assessment/Plan   Assessment/Plan       Active Hospital Problems    Diagnosis  POA   • **Hyperglycemia [R73.9]  Yes     Priority: High   • Hypokalemia, inadequate intake [E87.6]  Yes     Priority: High   • Alcoholism /alcohol abuse (CMS/HCC) [F10.20]  Yes     Priority: Medium   • Hyponatremia [E87.1]  Yes     Priority: Medium   • Transaminitis [R74.0]  Yes     Priority: Medium   • Tobacco abuse [Z72.0]  Unknown     Priority: Low   • Insomnia [G47.00]  Yes     Priority: Low      Resolved Hospital Problems   No resolved problems to display.     1.  Hyperglycemia  -Patient notes some mild intermittent confusion along with recent polydipsia  -Glucose: 419 on presentation  -Anion gap: 29.0, CO2: 24.0, ketones pending  -Continue normal saline with potassium replacement  -Plan to start insulin therapy once potassium improved  -Endocrinology and diabetic nurse educator consulted  -Patient n.p.o.    2.  Hypokalemia  -Potassium: 1.6 on presentation, replaced with oral potassium and IV infusion in the ED  -Recheck per protocol  -Replacement protocol ordered    3.  Alcohol abuse  -Discussed need for cessation especially in light of comorbid condition  -[facility] protocol ordered  -Case management consult placed    4.   Hyponatremia  -Sodium: 124 (when corrected for hyperglycemia sodium: 132)  -Monitor glucose and sodium while admitted    5.  Transaminitis  -ALT: 60, AST: 52, alk phos: 155  -Check ultrasound right upper quadrant  -Monitor CMP while admitted    6.  Tobacco abuse  -Discussed cessation especially in light of patient's comorbid conditions  -Nicotine patch ordered    7.  Insomnia  -Patient currently self-medicating with alcohol at night for assistance falling asleep  -Melatonin ordered            VTE Prophylaxis -   Mechanical Order History:     None      Pharmalogical Order History:     Ordered     Dose Route Frequency Stop    Signed and Held  enoxaparin (LOVENOX) syringe 40 mg      40 mg SC Every 24 Hours --          CODE STATUS:    Code Status and Medical Interventions:   Ordered at: 03/01/20 1654     Level Of Support Discussed With:    Patient     Code Status:    CPR     Medical Interventions (Level of Support Prior to Arrest):    Full       Admission Status:  I believe this patient meets observation criteria.      I discussed the patient's findings and my recommendations with patient.        Electronically signed by Chi Bustos PA-C, 03/01/20, 7:35 PM.  [facility] Hospitalist Team

## 2020-03-02 NOTE — PLAN OF CARE
Problem: Patient Care Overview  Goal: Plan of Care Review  Outcome: Ongoing (interventions implemented as appropriate)  Flowsheets (Taken 3/2/2020 2241)  Progress: no change  Plan of Care Reviewed With: patient  Note:   Patient admitted to PCU with diagnosis of N&V, diarrhea, and hypokalemia. Patient required fluid bolus this shift due to blood pressure dropped into the 80's and patient was symptomatic with dizziness and lightheaded. Patient requiring every 2 hour blood sugars due to admitted with blood sugar 400's. Blood sugar is now 100's. Patient continues on fluids with potassium and receiving oral potassium replacement.

## 2020-03-02 NOTE — PAYOR COMM NOTE
[facility]  [address] [place] [address]  NPI:[id number]  Tax ID: [id number]      Radha Scott RN  Utilization Review  [facility]  Phone: [phone]  Fax: [phone]  =================================  REQUEST FOR INPATIENT AUTHORIZATION      REFERENCE # :  [id number]      PLEASE FAX OR CALL DETERMINATION TO CONTACT ABOVE,  THANK YOU.    =================================  UR REVIEW-      Diabetes  ORG: M-130 (ISC)   Admission is indicated by 1 or more of the following(1)(2)(3)(4)(5)(6):  Hyperglycemia requiring inpatient care as indicated by 1 or more of the following:  Significant electrolyte abnormality (eg, hypokalemia, hyperkalemia, hypernatremia) that persists despite observation care treatment  General Admission Criteria  GRG: CG-GAC (ISC GRG)  Admission is indicated for 1 or more of the following(1):  Severe electrolyte abnormalities requiring inpatient care as indicated by ALL of the following(96)(97)(98):  Electrolyte abnormality is not at acceptable patient baseline (eg, treatment effect)  Severe abnormality as indicated by 1 or more of the following:  Potassium less than 2.5 mEq/L (mmol/L) not corrected to near normal or near chronic baseline despite observation care treatment     Observation order verified--inpatient criteria met  K+ 1.6 on admit, 2.2 today  Blood sugar 419 on admit- 202 today  IVFs with Kcl 40meq 150ml/hr     Endocrinology and Diabetes Educator consults pending    Smita Steele (54 y.o. Female)     Date of Birth Social Security Number Address Home Phone MRN    1965  [id number] [place] [address] [phone] [id number]    Religious Marital Status          Unknown Single       Admission Date Admission Type Admitting Provider Attending Provider Department, Room/Bed    3/1/20 Emergency Paddy Stout, DO Paddy Stout, DO [facility], 2115/1    Discharge Date Discharge Disposition  "Discharge Destination                       Attending Provider:  Paddy Stout DO    Allergies:  Penicillins    Isolation:  None   Infection:  None   Code Status:  CPR    Ht:  157.5 cm (62\")   Wt:  51 kg (112 lb 7 oz)    Admission Cmt:  None   Principal Problem:  Hyperglycemia [R73.9]                 Active Insurance as of 3/1/2020     Primary Coverage     Payor Plan Insurance Group Employer/Plan Group    [place]      Payor Plan Address Payor Plan Phone Number Payor Plan Fax Number Effective Dates    [address]       [place] [address]       Subscriber Name Subscriber Birth Date Member ID       SMITA MANCINI 1965 [id number]           Secondary Coverage     Payor Plan Insurance Group Employer/Plan Group    MEDICA[place]END[place]      Payor Plan Address Payor Plan Phone Number Payor Plan Fax Number Effective Dates       3/1/2020 - None Entered    Subscriber Name Subscriber Birth Date Member ID       SMITA MANCINI 1965 [id number]                 Emergency Contacts      (Rel.) Home Phone Work Phone Mobile Phone    [employer]IND[employer]IL[employer]ICK[employer] (Daughter) -- -- [phone]               History & Physical      Chi Bustos PA-C at [address] [place] [address] Medicine Services      Patient Name: Smita Mancini  : 1965  MRN: [id number]  Primary Care Physician: Provider, No Known  Date of admission: 3/1/2020    Patient Care Team:  Provider, No Known as PCP - General          Subjective   History Present Illness     Chief Complaint:   Chief Complaint   Patient presents with   • Weakness - Generalized       Ms. Mancini is a 54 y.o. female with past medical history of lupus, alcohol and tobacco abuse who [facility] complaining of nausea, vomiting, diarrhea and weakness for the past 9 days.  Patient states that over that time.  She has had increasing vomiting and diarrhea " "and noticed herself having more difficulty walking about her home stating that her \"legs buckle when she tries to move from room to room\".  She has had several falls however she states she has had no loss of consciousness and has not hit her head she is able to catch her self whenever she goes to the ground.  She denies any specific pain including chest pain, no shortness of air, cough, fevers or recent sick contacts.  She does not report any history of diabetes or previous episodes of hypoglycemia.  Polydipsia is reported without any polyuria or polyphagia.  Patient does state that for approximately the past 18 months following the death of her father she is drank 4 shots of vodka a day and continues to be 1/2 pack a day smoker.    In the ED patient found to have labs significant for troponin of less than 0.010, proBNP: 2043.0, glucose: 419, sodium: 124, potassium: 1.6, anion gap: 29.0, CO2: 24.0, alk phos: 155, ALT: 60, AST: 52.  CBC noted with an MCV of 101.1 and an MCHC of 36.5 hemoglobin: 14.6.  Otherwise unremarkable.  EKG shows sinus rhythm at 93 with flattened T waves and a QTC of 490 ms.    History of Present Illness    Review of Systems   Constitution: Positive for decreased appetite and malaise/fatigue.   HENT: Negative.    Eyes: Negative.    Cardiovascular: Negative.    Respiratory: Negative.    Endocrine: Positive for polydipsia. Negative for polyuria.   Skin: Negative.    Musculoskeletal: Negative.    Gastrointestinal: Positive for diarrhea, nausea and vomiting.   Genitourinary: Negative.    Neurological: Positive for weakness.   Psychiatric/Behavioral: Negative.            Personal History     Past Medical History:   Past Medical History:   Diagnosis Date   • Lupus (CMS/HCC)        Surgical History:      Past Surgical History:   Procedure Laterality Date   •  SECTION     • CHOLECYSTECTOMY     • HYSTERECTOMY             Family History: family history is not on file. Otherwise pertinent FHx was " reviewed and unremarkable.     Social History:  reports that she has been smoking. She has been smoking about 0.50 packs per day. She does not have any smokeless tobacco history on file. She reports that she drinks about 4.0 standard drinks of alcohol per week. She reports that she does not use drugs.      Medications:  Prior to Admission medications    Medication Sig Start Date End Date Taking? Authorizing Provider   ibuprofen (ADVIL,MOTRIN) 200 MG tablet Take 200 mg by mouth Every 6 (Six) Hours As Needed for Mild Pain .   Yes Provider, MD Gabrielle       Allergies:    Allergies   Allergen Reactions   • Penicillins Unknown - High Severity       Objective   Objective     Vital Signs  Temp:  [98.3 °F (36.8 °C)] 98.3 °F (36.8 °C)  Heart Rate:  [] 109  Resp:  [18] 18  BP: ()/(54-79) 107/79  SpO2:  [95 %-100 %] 99 %  on   ;      Body mass index is 19.27 kg/m².    Physical Exam   Constitutional: She is oriented to person, place, and time. She appears well-developed and well-nourished. No distress.   HENT:   Head: Normocephalic and atraumatic.   Right Ear: External ear normal.   Left Ear: External ear normal.   Nose: Nose normal.   Eyes: Conjunctivae and EOM are normal.   Neck: Normal range of motion.   Cardiovascular: Normal rate, regular rhythm, normal heart sounds and intact distal pulses.   Pulmonary/Chest: Effort normal and breath sounds normal.   Abdominal: Soft. Bowel sounds are normal.   Musculoskeletal: Normal range of motion.   Neurological: She is alert and oriented to person, place, and time.   Skin: Skin is warm and dry.   Psychiatric: She has a normal mood and affect. Her behavior is normal. Judgment and thought content normal.   Vitals reviewed.      Results Review:  I have personally reviewed most recent cardiac tracings and lab results and agree with findings, most notably: proBNP, glucose, sodium, potassium, alk phos, liver function and EKG.    Results from last 7 days   Lab Units  03/01/20  1433 03/01/20  1432   WBC 10*3/mm3 7.00  --    HEMOGLOBIN g/dL 14.6  --    HEMATOCRIT % 40.0  --    PLATELETS 10*3/mm3 230  --    INR   --  0.98     Results from last 7 days   Lab Units 03/01/20 2056 03/01/20  1532  03/01/20  1433   SODIUM mmol/L  --  124*  --   --    POTASSIUM mmol/L 2.3* 1.6*   < >  --    CHLORIDE mmol/L  --  71*  --   --    CO2 mmol/L  --  24.0  --   --    BUN mg/dL  --  12  --   --    CREATININE mg/dL  --  0.69  --   --    GLUCOSE mg/dL  --  419*  --   --    CALCIUM mg/dL  --  7.3*  --   --    ALT (SGPT) U/L  --  60*  --   --    AST (SGOT) U/L  --  52*  --   --    TROPONIN T ng/mL  --   --   --  <0.010   PROBNP pg/mL  --   --   --  2,043.0*    < > = values in this interval not displayed.     Estimated Creatinine Clearance: 69.5 mL/min (by C-G formula based on SCr of 0.69 mg/dL).  Brief Urine Lab Results  (Last result in the past 365 days)      Color   Clarity   Blood   Leuk Est   Nitrite   Protein   CREAT   Urine HCG        03/01/20 1530 Yellow Cloudy  Comment:  Result checked  Trace Small (1+) Negative 30 mg/dL (1+)               Microbiology Results (last 10 days)     ** No results found for the last 240 hours. **          ECG/EMG Results (most recent)     Procedure Component Value Units Date/Time    ECG 12 Lead [[id number]] Collected:  03/01/20 1405     Updated:  03/01/20 1407    Narrative:       HEART RATE= 93  bpm  RR Interval= 644  ms  AZ Interval= 136  ms  P Horizontal Axis= -4  deg  P Front Axis= 68  deg  QRSD Interval= 84  ms  QT Interval= 393  ms  QRS Axis= 75  deg  T Wave Axis= 262  deg  - ABNORMAL ECG -  Sinus rhythm  LAE, consider biatrial enlargement  Repol abnrm suggests ischemia, diffuse leads  Electronically Signed By:   Date and Time of Study: 2020-03-01 14:05:28    ECG 12 Lead [[id number]]  (Abnormal) Resulted:  03/01/20 1624     Updated:  03/01/20 1624                    No radiology results for the last 7 days      Estimated Creatinine Clearance: 69.5 mL/min (by C-G  formula based on SCr of 0.69 mg/dL).    Assessment/Plan   Assessment/Plan       Active Hospital Problems    Diagnosis  POA   • **Hyperglycemia [R73.9]  Yes     Priority: High   • Hypokalemia, inadequate intake [E87.6]  Yes     Priority: High   • Alcoholism /alcohol abuse (CMS/HCC) [F10.20]  Yes     Priority: Medium   • Hyponatremia [E87.1]  Yes     Priority: Medium   • Transaminitis [R74.0]  Yes     Priority: Medium   • Tobacco abuse [Z72.0]  Unknown     Priority: Low   • Insomnia [G47.00]  Yes     Priority: Low      Resolved Hospital Problems   No resolved problems to display.     1.  Hyperglycemia  -Patient notes some mild intermittent confusion along with recent polydipsia  -Glucose: 419 on presentation  -Anion gap: 29.0, CO2: 24.0, ketones pending  -Continue normal saline with potassium replacement  -Plan to start insulin therapy once potassium improved  -Endocrinology and diabetic nurse educator consulted  -Patient n.p.o.    2.  Hypokalemia  -Potassium: 1.6 on presentation, replaced with oral potassium and IV infusion in the ED  -Recheck per protocol  -Replacement protocol ordered    3.  Alcohol abuse  -Discussed need for cessation especially in light of comorbid condition  -CIWA protocol ordered  -Case management consult placed    4.  Hyponatremia  -Sodium: 124 (when corrected for hyperglycemia sodium: 132)  -Monitor glucose and sodium while admitted    5.  Transaminitis  -ALT: 60, AST: 52, alk phos: 155  -Check ultrasound right upper quadrant  -Monitor CMP while admitted    6.  Tobacco abuse  -Discussed cessation especially in light of patient's comorbid conditions  -Nicotine patch ordered    7.  Insomnia  -Patient currently self-medicating with alcohol at night for assistance falling asleep  -Melatonin ordered            VTE Prophylaxis -   Mechanical Order History:     None      Pharmalogical Order History:     Ordered     Dose Route Frequency Stop    Signed and Held  enoxaparin (LOVENOX) syringe 40 mg       40 mg SC Every 24 Hours --          CODE STATUS:    Code Status and Medical Interventions:   Ordered at: 03/01/20 8154     Level Of Support Discussed With:    Patient     Code Status:    CPR     Medical Interventions (Level of Support Prior to Arrest):    Full       Admission Status:  I believe this patient meets observation criteria.      I discussed the patient's findings and my recommendations with patient.        Electronically signed by Chi Bustos PA-C, 03/01/20, 7:35 PM.  [facility] Hospitalist Team          Electronically signed by Chi Bustos PA-C at 03/02/20 0057         Physician Progress Notes (last 48 hours) (Notes from 02/29/20 1344 through 03/02/20 1344)    No notes of this type exist for this encounter.       Consult Notes (last 48 hours) (Notes from 02/29/20 1344 through 03/02/20 1344)    No notes of this type exist for this encounter.

## 2020-03-03 LAB
ALBUMIN SERPL-MCNC: 2.9 G/DL (ref 3.5–5.2)
ALBUMIN/GLOB SERPL: 1.5 G/DL
ALP SERPL-CCNC: 137 U/L (ref 39–117)
ALT SERPL W P-5'-P-CCNC: 62 U/L (ref 1–33)
ANION GAP SERPL CALCULATED.3IONS-SCNC: 10 MMOL/L (ref 5–15)
ANION GAP SERPL CALCULATED.3IONS-SCNC: 11 MMOL/L (ref 5–15)
ANION GAP SERPL CALCULATED.3IONS-SCNC: 12 MMOL/L (ref 5–15)
ANION GAP SERPL CALCULATED.3IONS-SCNC: 13 MMOL/L (ref 5–15)
AST SERPL-CCNC: 81 U/L (ref 1–32)
BILIRUB SERPL-MCNC: 0.9 MG/DL (ref 0.2–1.2)
BUN BLD-MCNC: 5 MG/DL (ref 6–20)
BUN BLD-MCNC: 5 MG/DL (ref 6–20)
BUN BLD-MCNC: 6 MG/DL (ref 6–20)
BUN BLD-MCNC: 6 MG/DL (ref 6–20)
BUN/CREAT SERPL: 14.3 (ref 7–25)
BUN/CREAT SERPL: 14.7 (ref 7–25)
BUN/CREAT SERPL: 17.6 (ref 7–25)
BUN/CREAT SERPL: 19.4 (ref 7–25)
CA-I SERPL ISE-MCNC: 1.08 MMOL/L (ref 1.2–1.3)
CALCIUM SPEC-SCNC: 7.1 MG/DL (ref 8.6–10.5)
CALCIUM SPEC-SCNC: 7.6 MG/DL (ref 8.6–10.5)
CALCIUM SPEC-SCNC: 7.9 MG/DL (ref 8.6–10.5)
CALCIUM SPEC-SCNC: 8 MG/DL (ref 8.6–10.5)
CHLORIDE SERPL-SCNC: 100 MMOL/L (ref 98–107)
CHLORIDE SERPL-SCNC: 101 MMOL/L (ref 98–107)
CHLORIDE SERPL-SCNC: 98 MMOL/L (ref 98–107)
CHLORIDE SERPL-SCNC: 99 MMOL/L (ref 98–107)
CO2 SERPL-SCNC: 22 MMOL/L (ref 22–29)
CO2 SERPL-SCNC: 23 MMOL/L (ref 22–29)
CO2 SERPL-SCNC: 24 MMOL/L (ref 22–29)
CO2 SERPL-SCNC: 26 MMOL/L (ref 22–29)
CREAT BLD-MCNC: 0.31 MG/DL (ref 0.57–1)
CREAT BLD-MCNC: 0.34 MG/DL (ref 0.57–1)
CREAT BLD-MCNC: 0.34 MG/DL (ref 0.57–1)
CREAT BLD-MCNC: 0.35 MG/DL (ref 0.57–1)
CRP SERPL-MCNC: 1.39 MG/DL (ref 0–0.5)
DEPRECATED RDW RBC AUTO: 46.8 FL (ref 37–54)
EOSINOPHIL # BLD MANUAL: 0.09 10*3/MM3 (ref 0–0.4)
EOSINOPHIL NFR BLD MANUAL: 1 % (ref 0.3–6.2)
ERYTHROCYTE [DISTWIDTH] IN BLOOD BY AUTOMATED COUNT: 12.9 % (ref 12.3–15.4)
GFR SERPL CREATININE-BSD FRML MDRD: >150 ML/MIN/1.73
GLOBULIN UR ELPH-MCNC: 2 GM/DL
GLUCOSE BLD-MCNC: 121 MG/DL (ref 65–99)
GLUCOSE BLD-MCNC: 125 MG/DL (ref 65–99)
GLUCOSE BLD-MCNC: 130 MG/DL (ref 65–99)
GLUCOSE BLD-MCNC: 77 MG/DL (ref 65–99)
GLUCOSE BLDC GLUCOMTR-MCNC: 102 MG/DL (ref 70–105)
GLUCOSE BLDC GLUCOMTR-MCNC: 139 MG/DL (ref 70–105)
GLUCOSE BLDC GLUCOMTR-MCNC: 95 MG/DL (ref 70–105)
GLUCOSE BLDC GLUCOMTR-MCNC: 99 MG/DL (ref 70–105)
HCT VFR BLD AUTO: 30.9 % (ref 34–46.6)
HGB BLD-MCNC: 11.1 G/DL (ref 12–15.9)
LACTOFERRIN STL QL LA: NEGATIVE
LYMPHOCYTES # BLD MANUAL: 2.38 10*3/MM3 (ref 0.7–3.1)
LYMPHOCYTES NFR BLD MANUAL: 2 % (ref 5–12)
LYMPHOCYTES NFR BLD MANUAL: 27 % (ref 19.6–45.3)
MACROCYTES BLD QL SMEAR: ABNORMAL
MAGNESIUM SERPL-MCNC: 1.9 MG/DL (ref 1.6–2.6)
MAGNESIUM SERPL-MCNC: 2.4 MG/DL (ref 1.6–2.6)
MAGNESIUM SERPL-MCNC: 3.1 MG/DL (ref 1.6–2.6)
MCH RBC QN AUTO: 37.5 PG (ref 26.6–33)
MCHC RBC AUTO-ENTMCNC: 35.8 G/DL (ref 31.5–35.7)
MCV RBC AUTO: 104.7 FL (ref 79–97)
MONOCYTES # BLD AUTO: 0.18 10*3/MM3 (ref 0.1–0.9)
NEUTROPHILS # BLD AUTO: 6.16 10*3/MM3 (ref 1.7–7)
NEUTROPHILS NFR BLD MANUAL: 65 % (ref 42.7–76)
NEUTS BAND NFR BLD MANUAL: 5 % (ref 0–5)
NRBC SPEC MANUAL: 1 /100 WBC (ref 0–0.2)
PHOSPHATE SERPL-MCNC: 0.9 MG/DL (ref 2.5–4.5)
PHOSPHATE SERPL-MCNC: 1 MG/DL (ref 2.5–4.5)
PHOSPHATE SERPL-MCNC: 2.3 MG/DL (ref 2.5–4.5)
PHOSPHATE SERPL-MCNC: 4.1 MG/DL (ref 2.5–4.5)
PLAT MORPH BLD: NORMAL
PLATELET # BLD AUTO: 202 10*3/MM3 (ref 140–450)
PMV BLD AUTO: 10.9 FL (ref 6–12)
POTASSIUM BLD-SCNC: 3.1 MMOL/L (ref 3.5–5.2)
POTASSIUM BLD-SCNC: 3.2 MMOL/L (ref 3.5–5.2)
POTASSIUM BLD-SCNC: 3.4 MMOL/L (ref 3.5–5.2)
POTASSIUM BLD-SCNC: 4.2 MMOL/L (ref 3.5–5.2)
POTASSIUM BLD-SCNC: 4.7 MMOL/L (ref 3.5–5.2)
PROCALCITONIN SERPL-MCNC: 1.04 NG/ML (ref 0.1–0.25)
PROT SERPL-MCNC: 4.9 G/DL (ref 6–8.5)
RBC # BLD AUTO: 2.95 10*6/MM3 (ref 3.77–5.28)
SCAN SLIDE: NORMAL
SODIUM BLD-SCNC: 133 MMOL/L (ref 136–145)
SODIUM BLD-SCNC: 134 MMOL/L (ref 136–145)
SODIUM BLD-SCNC: 136 MMOL/L (ref 136–145)
SODIUM BLD-SCNC: 136 MMOL/L (ref 136–145)
T4 FREE SERPL-MCNC: 0.73 NG/DL (ref 0.93–1.7)
TOXIC GRANULATION: ABNORMAL
TSH SERPL DL<=0.05 MIU/L-ACNC: 6.68 UIU/ML (ref 0.27–4.2)
WBC NRBC COR # BLD: 8.8 10*3/MM3 (ref 3.4–10.8)

## 2020-03-03 PROCEDURE — 85007 BL SMEAR W/DIFF WBC COUNT: CPT | Performed by: HOSPITALIST

## 2020-03-03 PROCEDURE — 83735 ASSAY OF MAGNESIUM: CPT | Performed by: HOSPITALIST

## 2020-03-03 PROCEDURE — 80053 COMPREHEN METABOLIC PANEL: CPT | Performed by: HOSPITALIST

## 2020-03-03 PROCEDURE — G0108 DIAB MANAGE TRN  PER INDIV: HCPCS

## 2020-03-03 PROCEDURE — 84145 PROCALCITONIN (PCT): CPT | Performed by: HOSPITALIST

## 2020-03-03 PROCEDURE — 82962 GLUCOSE BLOOD TEST: CPT

## 2020-03-03 PROCEDURE — 25010000002 ENOXAPARIN PER 10 MG: Performed by: HOSPITALIST

## 2020-03-03 PROCEDURE — 99232 SBSQ HOSP IP/OBS MODERATE 35: CPT | Performed by: HOSPITALIST

## 2020-03-03 PROCEDURE — 84439 ASSAY OF FREE THYROXINE: CPT | Performed by: HOSPITALIST

## 2020-03-03 PROCEDURE — 84443 ASSAY THYROID STIM HORMONE: CPT | Performed by: INTERNAL MEDICINE

## 2020-03-03 PROCEDURE — 85025 COMPLETE CBC W/AUTO DIFF WBC: CPT | Performed by: HOSPITALIST

## 2020-03-03 PROCEDURE — 84681 ASSAY OF C-PEPTIDE: CPT | Performed by: INTERNAL MEDICINE

## 2020-03-03 PROCEDURE — 82330 ASSAY OF CALCIUM: CPT | Performed by: HOSPITALIST

## 2020-03-03 PROCEDURE — 99232 SBSQ HOSP IP/OBS MODERATE 35: CPT | Performed by: INTERNAL MEDICINE

## 2020-03-03 PROCEDURE — 63710000001 INSULIN LISPRO (HUMAN) PER 5 UNITS: Performed by: INTERNAL MEDICINE

## 2020-03-03 PROCEDURE — 83630 LACTOFERRIN FECAL (QUAL): CPT | Performed by: HOSPITALIST

## 2020-03-03 PROCEDURE — 84100 ASSAY OF PHOSPHORUS: CPT | Performed by: HOSPITALIST

## 2020-03-03 PROCEDURE — 84132 ASSAY OF SERUM POTASSIUM: CPT | Performed by: PHYSICIAN ASSISTANT

## 2020-03-03 PROCEDURE — 94799 UNLISTED PULMONARY SVC/PX: CPT

## 2020-03-03 PROCEDURE — 25010000002 LEVOFLOXACIN PER 250 MG: Performed by: HOSPITALIST

## 2020-03-03 PROCEDURE — 63710000001 INSULIN GLARGINE PER 5 UNITS: Performed by: INTERNAL MEDICINE

## 2020-03-03 PROCEDURE — 86341 ISLET CELL ANTIBODY: CPT | Performed by: INTERNAL MEDICINE

## 2020-03-03 PROCEDURE — 86376 MICROSOMAL ANTIBODY EACH: CPT | Performed by: INTERNAL MEDICINE

## 2020-03-03 PROCEDURE — 86140 C-REACTIVE PROTEIN: CPT | Performed by: HOSPITALIST

## 2020-03-03 RX ORDER — ACETAMINOPHEN 325 MG/1
650 TABLET ORAL EVERY 6 HOURS PRN
Status: DISCONTINUED | OUTPATIENT
Start: 2020-03-03 | End: 2020-03-07 | Stop reason: HOSPADM

## 2020-03-03 RX ORDER — LEVOFLOXACIN 5 MG/ML
750 INJECTION, SOLUTION INTRAVENOUS EVERY 24 HOURS
Status: DISCONTINUED | OUTPATIENT
Start: 2020-03-03 | End: 2020-03-05

## 2020-03-03 RX ORDER — OXYCODONE HYDROCHLORIDE 5 MG/1
5 TABLET ORAL EVERY 4 HOURS PRN
Status: DISCONTINUED | OUTPATIENT
Start: 2020-03-03 | End: 2020-03-06

## 2020-03-03 RX ORDER — INSULIN GLARGINE 100 [IU]/ML
14 INJECTION, SOLUTION SUBCUTANEOUS EVERY MORNING
Status: DISCONTINUED | OUTPATIENT
Start: 2020-03-04 | End: 2020-03-04

## 2020-03-03 RX ORDER — LEVOTHYROXINE SODIUM 0.03 MG/1
25 TABLET ORAL
Status: DISCONTINUED | OUTPATIENT
Start: 2020-03-04 | End: 2020-03-07 | Stop reason: HOSPADM

## 2020-03-03 RX ADMIN — ACETAMINOPHEN 650 MG: 325 TABLET, FILM COATED ORAL at 03:20

## 2020-03-03 RX ADMIN — METRONIDAZOLE 500 MG: 500 INJECTION, SOLUTION INTRAVENOUS at 22:34

## 2020-03-03 RX ADMIN — SODIUM CHLORIDE 45 MMOL: 900 INJECTION, SOLUTION INTRAVENOUS at 09:29

## 2020-03-03 RX ADMIN — ACETAMINOPHEN 650 MG: 325 TABLET, FILM COATED ORAL at 09:29

## 2020-03-03 RX ADMIN — OXYCODONE HYDROCHLORIDE 5 MG: 5 TABLET ORAL at 18:03

## 2020-03-03 RX ADMIN — OXYCODONE HYDROCHLORIDE 5 MG: 5 TABLET ORAL at 12:40

## 2020-03-03 RX ADMIN — THERA TABS 1 TABLET: TAB at 09:29

## 2020-03-03 RX ADMIN — FOLIC ACID 1 MG: 1 TABLET ORAL at 09:29

## 2020-03-03 RX ADMIN — Medication 10 ML: at 22:33

## 2020-03-03 RX ADMIN — POTASSIUM CHLORIDE 40 MEQ: 1500 TABLET, EXTENDED RELEASE ORAL at 04:09

## 2020-03-03 RX ADMIN — INSULIN GLARGINE 15 UNITS: 100 INJECTION, SOLUTION SUBCUTANEOUS at 09:29

## 2020-03-03 RX ADMIN — Medication 10 ML: at 09:34

## 2020-03-03 RX ADMIN — LEVOFLOXACIN 750 MG: 5 INJECTION, SOLUTION INTRAVENOUS at 15:47

## 2020-03-03 RX ADMIN — ENOXAPARIN SODIUM 40 MG: 40 INJECTION SUBCUTANEOUS at 15:46

## 2020-03-03 RX ADMIN — POTASSIUM & SODIUM PHOSPHATES POWDER PACK 280-160-250 MG 2 PACKET: 280-160-250 PACK at 04:10

## 2020-03-03 RX ADMIN — INSULIN LISPRO 5 UNITS: 100 INJECTION, SOLUTION INTRAVENOUS; SUBCUTANEOUS at 09:29

## 2020-03-03 RX ADMIN — NICOTINE 1 PATCH: 21 PATCH TRANSDERMAL at 22:37

## 2020-03-03 RX ADMIN — METRONIDAZOLE 500 MG: 500 INJECTION, SOLUTION INTRAVENOUS at 12:40

## 2020-03-03 RX ADMIN — Medication 100 MG: at 09:29

## 2020-03-03 RX ADMIN — ACETAMINOPHEN 650 MG: 325 TABLET, FILM COATED ORAL at 15:24

## 2020-03-03 RX ADMIN — AZITHROMYCIN MONOHYDRATE 500 MG: 250 TABLET ORAL at 09:29

## 2020-03-03 RX ADMIN — OXYCODONE HYDROCHLORIDE 5 MG: 5 TABLET ORAL at 22:36

## 2020-03-03 RX ADMIN — Medication 10 ML: at 22:39

## 2020-03-03 RX ADMIN — IPRATROPIUM BROMIDE AND ALBUTEROL SULFATE 3 ML: .5; 3 SOLUTION RESPIRATORY (INHALATION) at 19:31

## 2020-03-03 RX ADMIN — Medication 10 ML: at 09:33

## 2020-03-03 NOTE — CONSULTS
Nutrition Services    Patient Name:  Smita Steele  YOB: 1965  MRN: [id number]  Admit Date:  3/1/2020    Comments: Moderate chronic disease related malnutrition related to physiological causes (new dx DM, Tobacco/Alcohol Abuse, Lupus) as evidenced by insufficient energy intake (<75% of estimated energy requirement for >1 month) and findings from Nutrition-Focused Physical Exam (moderate muscle wasting, moderate fat loss).    Nutrition Intervention:   1. Patient declined boost-type supplements.  2. Encouraged patient to continue to drink milk (whole) with every meal (will provide 450 kcal/24 grams protein).  3. Ordering Cottage Cheese with lunch meals to provide additional nutrients with meal (80 kcal, 13 grams protein).  4. Provided and reviewed DM diet nutrition therapy (High A1C Tips with Julianne's contact information, Healthy Plate Model, Starchy vs Non-starchy Vegetables, and Carb-Controlled Snacks). Endocrinology and Diabetes Educator following as well. Patient agreeable to requesting for RDN if additional DM diet questions arise.    Reason for Assessment                Reason for Assessment    Reason For Assessment Malnutrition Severity Assessment Consult, High A1C (new dx DM)  (3/3)       Diagnosis H&P: Lupus, Alcohol and Tobacco Abuse    Current Problems: admit r/t N/V/D x 9 days    -new onset DM (questionable type); endocrine following, DM educator following    -infectious diarrhea    -hypokalemia and hypophosphatemia    -alcohol abuse, [facility] protocol ordered    -tobacco abuse    -lupus         Nutrition/Diet History                Nutrition/Diet History    Food/Fluid Intake Narrative   3/3: Patient reports inability to eat any foods for the past 10 days due to vomiting, states prior to this she would normally only eat one meal per day, does not like boost-type drinks however does like milk and cottage cheese, patient reports she has been ordering milk with every meal     Functional Status  Previously independent however with increased weakness for ~2 weeks     Food Allergies NKFA     Factors Affecting Nutritional Intake  Recent N/V, New dx DM, Diarrhea         Anthropometrics             Anthropometrics    Height 62”    Weight 119 lb (3/3/20)       Admit Weight    Admit Weight 105 lb (3/1/20)  Vs  112 lb (3/1/20)       Ideal Body Weight (IBW)    Ideal Body Weight (IBW) 110 lb    % Ideal Body Weight 108%       Usual Body Weight (UBW)    Usual Body Weight 120 lb per pt, reports she last weighed this 2 weeks ago    % Usual Body Weight 99%    Weight Hx  None in EMR       Body Mass Index (BMI)    BMI (kg/m2) 21.77 kg/m2           Labs/Medications                Labs    Pertinent Lab Results Comments  Gluc 130/125/95, Na 136, K 3.2 L, BUN 5 L, Crt 0.34 L, Ca 7.9 L, Alb 2.9 L, Ph 0.9 L, Mg 3.1 H, Hgb 11.1 L, Hct 30.9 L    Recent A1C 12.9% (3/2)        Medications    Pertinent Medications Comments  Folic Acid, Lantus, Humalog, Potassium Phosphates, MVI, Thiamine, IVF with KCl, Mg sulfate, Phos-nak, potassium chloride         Physical Findings                Physical Findings    Overall Physical Appearance 3/3: NFPE completed noting moderate muscle wasting in some areas and moderate fat loss in some areas as well, moderate chronic malnutrition documented, although likely acute exacerbated by N/V       Edema  None documented     Gastrointestinal N/V: none today  BM: x1 3/3, pt reports diarrhea     Tubes No feeding tube     Oral/Mouth Cavity No issues reported     Skin Intact         Estimated/Assessed Needs              Calorie Requirements     Height Used for Calorie Calculations       Weight Used for Calorie Calculations      Formula chosen for Calorie Calculations       Estimated Calorie Requirements (kcals/day)              Protein Requirements    Weight Used For Protein Calculations       Est Protein Requirement Amount (gms/kg)       Estimated Protein Requirements (gms/day)          Fluid Requirements      Estimated Fluid Requirements (mL/day)            Fluid Deficit       Desired Sodium Level (mEq/L)      Desired Sodium Level (mEq/L)      Estimated Fluid Deficit Needs (L)           Nutrition Prescription Ordered                Nutrition Prescription PO    Current PO Diet  Consistent Carb     Supplement         Nutrition Prescription EN    Enteral Route -     TF modular -     TF Delivery Method -     Current Ordered TF  -     Current Ordered Water flush  -     TF Observation  -        Nutrition Prescription TPN    TPN Route -     Current Ordered TPN Volume  -     Dextrose (gm/kcals)  -     Amino Acid (gm/kcals) -     Lipids (Concentration/ML/Frequency)  -         Evaluation of Received Nutrient/Fluid Intake                PO Evaluation    % PO Intake 67% x 3 meals recorded        EN Evaluation    TF Changes -     TF Residual -     TF Tolerance      Average EN Delivered  -        TPN Evaluation    Total Number of Days on TPN  -           Clinical Course      Clinical Nutrition Course Details  3/2-current: Consistent Carb  3/2: Full Liquid DM  3/2: Consistent Carb, Healthy Heart         Problem/Interventions:                     Nutrition Diagnoses Problem 1      Problem 1   Moderate chronic disease related malnutrition related to physiological causes (new dx DM, Tobacco/Alcohol Abuse, Lupus) as evidenced by insufficient energy intake (<75% of estimated energy requirement for >1 month) and findings from Nutrition-Focused Physical Exam (moderate muscle wasting, moderate fat loss).     Nutrition Diagnoses Problem 2      Problem 2 Altered nutrition-related lab values related to endocrine disorder as evidenced by newly dx of DM and a1c 12.9%.           Intervention Goal                Intervention Goal    General PO intake >75% at meals    BG levels remain WNL         Nutrition Intervention                Nutrition Intervention    RD/Tech Action Patient declined boost-type supplements.    Encouraged patient to continue to  drink milk (whole) with every meal (will provide 450 kcal/24 grams protein).    Ordering Cottage Cheese with lunch meals to provide additional nutrients with meal (80 kcal, 13 grams protein).    Provided and reviewed DM diet nutrition therapy (High A1C Tips with Julianne's contact information, Healthy Plate Model, Starchy vs Non-starchy Vegetables, and Carb-Controlled Snacks). Patient agreeable to requesting for RDN if additional DM diet questions arise.         Nutrition Prescription                Nutrition Prescription PO      Diet  Consistent Carb     Supplement Cottage Cheese with Lunch Meals    *Pt to drink Whole milk with B/L/D        Nutrition Prescription EN    Enteral Prescription -        Nutrition Prescription TPN    TPN Prescription -         Monitor/Evaluation                Monitor/Evaluation    Monitor  PO intake, Weight, Labs, Meds, Skin, and BMs           Electronically signed by:  Tata Witt RD  03/03/20 9:35 AM

## 2020-03-03 NOTE — CONSULTS
Social Work Assessment  [facility]     Patient Name: Smita Steele  MRN: [id number]  Today's Date: 3/3/2020    Admit Date: 3/1/2020    Discharge Plan     Row Name 03/03/20 1721       Plan    Plan  DC Plan: [facility] - referral pending. PASRR approved. No precert required (HPE insurance). 2nd: [place], 3rd: [place]    Patient/Family in Agreement with Plan  yes    Plan Comments  SW met with patient regarding rehab recommendation. Pt agreeable to referral to facilities in [facility]. Pt insursed with HPE at this time.         Destination      Service Provider Request Status Selected Services Address Phone Number Fax Number    [facility] Pending - [facility] Sent N/A [address] [facility] [place] [address] [phone]     Psychosocial     Row Name 03/03/20 1727       Coping/Stress    Major Change/Loss/Stressor  death of a loved one;financial    Coping/Stress Comments  Pt currently uninsured & found to be eligible for Medicaid Presumptive Eligibility (RID #: [id number]). Pt expressed not having a PCP & was informed about [facility] as pt is located in [place], pt to f/u. Pt expected to d/c to in rehab upon hospital discharge. Pt did identify that her father passed away 18 months ago & she was still learning in regards to coping with this loss.         Substance Abuse     Row Name 03/03/20 1725       Substance Use    Substance Use Status  current alcohol use    Environment Typically Uses Alcohol  alone    Readiness to Change Alcohol Use  termination    Attempts to Quit Alcohol  quit on own    Longest Period of Alcohol Sobriety  see comments    Alcohol Withdrawal Pattern  other (see comments) None.    Previous Substance Use Treatment  none    Substance Use Comment  SW met with patient at bedside regarding ETOH use. Pt reports that 18 months ago her dad passed away & she started drinking to assist with coping. Pt reports that she would  purchase 99 cents bottles at the Liquor store - admits to drink 2 in the afternoon & 2 in the evening. Pt reports d/t recent hospital admission, she has no plans to continue drinking after d/c, therefore pt denied resources from . See  note regarding d/c planning for rehab.      ELADIA Baird    Phone: [phone]  Cell: [phone]  Fax: [phone]  [email or web address]

## 2020-03-03 NOTE — PROGRESS NOTES
Malnutrition Severity Assessment    Patient Name:  Smita Steele  YOB: 1965  MRN: [id number]  Admit Date:  3/1/2020    Patient meets criteria for : Moderate (non-severe) Malnutrition    Comments:  Moderate chronic disease related malnutrition related to physiological causes (new dx DM, Tobacco/Alcohol Abuse, Lupus) as evidenced by insufficient energy intake (<75% of estimated energy requirement for >1 month) and findings from Nutrition-Focused Physical Exam (moderate muscle wasting, moderate fat loss).    Malnutrition present on admission.    Nutrition Intervention:   1. Patient declined boost-type supplements.  2. Encouraged patient to continue to drink milk (whole) with every meal (will provide 450 kcal/24 grams protein).  3. Ordering Cottage Cheese with lunch meals to provide additional nutrients with meal (80 kcal, 13 grams protein).  4. Provided and reviewed DM diet nutrition therapy (High A1C Tips with Julianne's contact information, Healthy Plate Model, Starchy vs Non-starchy Vegetables, and Carb-Controlled Snacks). Endocrinology and Diabetes Educator following as well. Patient agreeable to requesting for RDN if additional DM diet questions arise.      Malnutrition Severity Assessment  Malnutrition Type: Chronic Disease - Related Malnutrition     Malnutrition Type (last 8 hours)      Malnutrition Severity Assessment     Row Name 03/03/20 1317       Malnutrition Severity Assessment    Malnutrition Type  Chronic Disease - Related Malnutrition    Row Name 03/03/20 1317       Insufficient Energy Intake     Insufficient Energy Intake   <75% of est. energy requirement for > or equal to 1 month    Row Name 03/03/20 1317       Muscle Loss    Loss of Muscle Mass Findings  Moderate    Temple Region  Severe - deep hollowing/scooping, lack of muscle to touch, facial bones well defined    Clavicle Bone Region  None    Acromion Bone Region  None    Scapular Bone Region  None    Dorsal Hand Region   Moderate - slight depression    Patellar Region  Moderate - patella more prominent, less muscle definition around patella    Anterior Thigh Region  None    Posterior Calf Region  Moderate - some roundness, slight firmness    Row Name 03/03/20 1317       Fat Loss    Subcutaneous Fat Loss Findings  Moderate    Orbital Region   None    Upper Arm Region  Moderate - some fat tissue, not ample    Thoracic & Lumbar Region  Moderate - ribs visible with mild depressions, iliac crest somewhat prominent    Row Name 03/03/20 5687       Criteria Met (Must meet criteria for severity in at least 2 of these categories: M Wasting, Fat Loss, Fluid, Secondary Signs, Wt. Status, Intake)    Patient meets criteria for   Moderate (non-severe) Malnutrition          Electronically signed by:  Tata Witt RD  03/03/20 1:19 PM

## 2020-03-03 NOTE — PLAN OF CARE
Problem: Patient Care Overview  Goal: Plan of Care Review  Outcome: Ongoing (interventions implemented as appropriate)  Flowsheets  Taken 3/3/2020 0619 by Marcy Dumas RN  Progress: improving  Taken 3/2/2020 1600 by Kody Godinez OT  Plan of Care Reviewed With: patient  Note:   Continue to replace pt electrolytes. Pt new d/o DM. Education provided.

## 2020-03-03 NOTE — CONSULTS
Diabetes Education    Patient Name:  Smita Steele  YOB: 1965  MRN: [id number]  Admit Date:  3/1/2020    Follow up education: Instructed pt in use of the insulin pen. She performed procedure correctly. Discussed when to take the long-acting insulin and when to take the meal-time insulin. Reviewed importance of not taking meal-time insulin if not eating a meal. Reviewed injection sites and importance of rotating the injection sites. Discussed storage of insulin and proper disposal of pen needles. Reviewed importance of checking bs ac and hs daily. Pt states she does not need to review use of Accuchek Guide Me meter. Discussed importance of recording bs readings and notifying MD of readings. Pt states she is needing to find MD in this area. Discussed difference between type 1 and type 2 diabetes. Discussed not sure at this time which type she has and MD is running tests. Discussed checking for urine ketones when bs >250 or when ill. Discussed DKA prevention. Pt states she has given herself B12 injections and does not feel it is necessary to practice giving herself an injection. Pt did perform injection into foam pad correctly. Pt states will be going to inpatient rehab facility at d/c. Educator started rxs in Epic for lantus pens, humalog kwikpens, pen needles, Accuchek Guide test strips, Accuchek Fastclix lancets and ketone test strips. Gave pt insulin pen instruction sheet, injection sites instruction sheet, insulin administration instruction sheet and [facility] education class info. Pt verbalized understanding of all info covered in session. Pt with no additional questions at this time. Gave sample pen needles.         Electronically signed by:  Annabelle Espinal RN  03/03/20 12:13 PM

## 2020-03-03 NOTE — PROGRESS NOTES
Daily Progress Note    Patient Care Team:  Kayley Loco Known as PCP - General    Chief Complaint: Follow-up diabetes    HPI: Patient seen and examined today.  She is looking better today.  She is able to carry on a conversation.  She has history of lupus with alcohol and tobacco abuse initially admitted with 9 days history of nausea, vomiting, diarrhea and weakness.  She was found to have significant hyperglycemia as well as severe hypokalemia and also had significantly low phosphorus.  These are all been replaced and she is started on Lantus with Humalog as basal bolus insulin therapy.  She is eating well.  No complaints at this time.    ROS:   Constitutional:  Denies fatigue, tiredness.    Eyes:  Denies change in visual acuity   HENT:  Denies nasal congestion or sore throat   Respiratory: denies cough, shortness of breath.   Cardiovascular:  denies chest pain, edema   GI:  Denies abdominal pain, nausea, vomiting.   :  Denies polyuria and polydipsia  Musculoskeletal:  Denies back pain or joint pain   Integument:  Denies rash   Neurologic:  Denies headache, focal weakness or sensory changes   Endocrine:  Denies polyuria or polydipsia   Psychiatric:  Denies depression or anxiety       Vitals:    03/03/20 1728   BP: 92/67   Pulse: 103   Resp: 18   Temp: 98.3 °F (36.8 °C)   SpO2: 97%       Physical Exam:  GEN: NAD, conversant  EYES: EOMI, PERRL, no conjunctival erythema  NECK: no thyromegaly, full ROM   CV: RRR, no murmurs/rubs/gallops, no peripheral edema  LUNG: CTAB, no wheezes/rales/ronchi  SKIN: no rashes, no acanthosis  MSK: no deformities, full ROM of all extremities  NEURO: no tremors, DTR normal  PSYCH: AOX3, appropriate mood, affect normal      Results Review:     I reviewed the patient's new clinical results.    Glucose   Date Value Ref Range Status   03/03/2020 77 65 - 99 mg/dL Final     Sodium   Date Value Ref Range Status   03/03/2020 134 (L) 136 - 145 mmol/L Final     Potassium   Date Value Ref  Range Status   03/03/2020 4.2 3.5 - 5.2 mmol/L Final     CO2   Date Value Ref Range Status   03/03/2020 23.0 22.0 - 29.0 mmol/L Final     Chloride   Date Value Ref Range Status   03/03/2020 100 98 - 107 mmol/L Final     Anion Gap   Date Value Ref Range Status   03/03/2020 11.0 5.0 - 15.0 mmol/L Final     Creatinine   Date Value Ref Range Status   03/03/2020 0.35 (L) 0.57 - 1.00 mg/dL Final     BUN   Date Value Ref Range Status   03/03/2020 5 (L) 6 - 20 mg/dL Final     BUN/Creatinine Ratio   Date Value Ref Range Status   03/03/2020 14.3 7.0 - 25.0 Final     Calcium   Date Value Ref Range Status   03/03/2020 7.6 (L) 8.6 - 10.5 mg/dL Final     eGFR Non  Amer   Date Value Ref Range Status   03/03/2020 >150 >60 mL/min/1.73 Final     Alkaline Phosphatase   Date Value Ref Range Status   03/03/2020 137 (H) 39 - 117 U/L Final     Total Protein   Date Value Ref Range Status   03/03/2020 4.9 (L) 6.0 - 8.5 g/dL Final     ALT (SGPT)   Date Value Ref Range Status   03/03/2020 62 (H) 1 - 33 U/L Final     AST (SGOT)   Date Value Ref Range Status   03/03/2020 81 (H) 1 - 32 U/L Final     Total Bilirubin   Date Value Ref Range Status   03/03/2020 0.9 0.2 - 1.2 mg/dL Final     Albumin   Date Value Ref Range Status   03/03/2020 2.90 (L) 3.50 - 5.20 g/dL Final     Globulin   Date Value Ref Range Status   03/03/2020 2.0 gm/dL Final     Magnesium   Date Value Ref Range Status   03/03/2020 2.4 1.6 - 2.6 mg/dL Final     Phosphorus   Date Value Ref Range Status   03/03/2020 2.3 (L) 2.5 - 4.5 mg/dL Final     Lab Results   Component Value Date    HGBA1C 12.9 (H) 03/02/2020     No results found for: GLUF, MICROALBUR  Results from last 7 days   Lab Units 03/03/20  1633 03/03/20  1129 03/03/20  0708 03/02/20  2115 03/02/20  1632 03/02/20  1157   GLUCOSE mg/dL 102 99 95 124* 204* 389*     Results for SUNITA MANCINI (MRN [id number]) as of 3/3/2020 18:11   Ref. Range 3/2/2020 02:58 3/3/2020 02:49   Hemoglobin A1C Latest Ref Range: 3.5 -  5.6 % 12.9 (H)    TSH Baseline Latest Ref Range: 0.270 - 4.200 uIU/mL 6.580 (H) 6.680 (H)   Free T4 Latest Ref Range: 0.93 - 1.70 ng/dL  0.73 (L)           Medication Review: Reviewed.       enoxaparin 40 mg Subcutaneous Q24H   folic acid 1 mg Oral Daily   insulin glargine 15 Units Subcutaneous QAM   insulin lispro 0-7 Units Subcutaneous TID With Meals   insulin lispro 5 Units Subcutaneous TID With Meals   levoFLOXacin 750 mg Intravenous Q24H   metroNIDAZOLE 500 mg Intravenous Q8H   nicotine 1 patch Transdermal Nightly   potassium phosphate 45 mmol Intravenous Once   sodium chloride 1,000 mL Intravenous Once   sodium chloride 10 mL Intravenous Q12H   sodium chloride 10 mL Intravenous Q12H   THERA 1 tablet Oral Daily   thiamine 100 mg Oral Daily         Assessment/Plan   1.  Diabetes mellitus: Questionable type, I will treated as type I for now.  C-peptide and eryn 65 antibodies pending.  2.  Severe hypokalemia: Status post replacement  3.  Severe hypophosphatemia status post replacement  4.  Malnutrition  5.  Lupus  6.  Abnormal TSH consistent with hypothyroidism.    Plan:   Since her blood sugars are running on the low normal range, I will reduce glargine to 14 units subcu every morning and Humalog to 4 with each meal and continue to follow blood sugars and make further adjustments.    For high TSH, I will add levothyroxine 25 mcg p.o. daily.             José Miguel Regalado MD. FACE

## 2020-03-03 NOTE — PLAN OF CARE
Pt complaining of tooth pain and has swelling on the right side of her face. ABX changed and pt started on Roxicodone for pain control. Kphos given and will recheck K and Phos once infusion is complete. Pt is stable and has orders to transfer to medical monitored bed.       Problem: Patient Care Overview  Goal: Plan of Care Review  Outcome: Ongoing (interventions implemented as appropriate)  Goal: Individualization and Mutuality  Outcome: Ongoing (interventions implemented as appropriate)  Goal: Discharge Needs Assessment  Outcome: Ongoing (interventions implemented as appropriate)  Goal: Interprofessional Rounds/Family Conf  Outcome: Ongoing (interventions implemented as appropriate)     Problem: Alcohol Withdrawal Acute, Risk/Actual (Adult)  Goal: Signs and Symptoms of Listed Potential Problems Will be Absent, Minimized or Managed (Alcohol Withdrawal Acute, Risk/Actual)  Outcome: Ongoing (interventions implemented as appropriate)     Problem: Nausea/Vomiting (Adult)  Goal: Identify Related Risk Factors and Signs and Symptoms  Outcome: Ongoing (interventions implemented as appropriate)  Goal: Symptom Relief  Outcome: Ongoing (interventions implemented as appropriate)  Goal: Adequate Hydration  Outcome: Ongoing (interventions implemented as appropriate)     Problem: Skin Injury Risk (Adult)  Goal: Identify Related Risk Factors and Signs and Symptoms  Outcome: Ongoing (interventions implemented as appropriate)  Goal: Skin Health and Integrity  Outcome: Ongoing (interventions implemented as appropriate)     Problem: Fall Risk (Adult)  Goal: Identify Related Risk Factors and Signs and Symptoms  Outcome: Ongoing (interventions implemented as appropriate)  Goal: Absence of Fall  Outcome: Ongoing (interventions implemented as appropriate)     Problem: Nutrition, Imbalanced: Inadequate Oral Intake (Adult)  Goal: Identify Related Risk Factors and Signs and Symptoms  Outcome: Ongoing (interventions implemented as  appropriate)  Goal: Improved Oral Intake  Outcome: Ongoing (interventions implemented as appropriate)  Goal: Prevent Further Weight Loss  Outcome: Ongoing (interventions implemented as appropriate)

## 2020-03-03 NOTE — CONSULTS
Inpatient Endocrine Consult  Consultation requested by Dr. Anaya for new diagnosis of diabetes  Patient Care Team:  Provider, No Known as PCP - General    Chief Complaint: New diagnosis of diabetes    HPI: 54-year-old female with history of lupus, alcohol and tobacco abuse admitted with 9 days history of nausea, vomiting, diarrhea and weakness.  She tells us that she could not stand up had lost 5 pounds of weight was having polyuria and nocturia and came to the hospital.  She was found to have severe hypokalemia along with hyperglycemia consistent with new diagnosis of diabetes as she does not have any diagnosis of diabetes before she came in.  She has been started on insulin.  She still feels fatigued and exhausted.  She is eating better now.  Nausea and vomiting have improved.  She is already seen by the diabetes educators.    Past Medical History:   Diagnosis Date   • Lupus (CMS/Formerly [facility])        Social History     Socioeconomic History   • Marital status: Single     Spouse name: Not on file   • Number of children: Not on file   • Years of education: Not on file   • Highest education level: Not on file   Tobacco Use   • Smoking status: Current Some Day Smoker     Packs/day: 0.50   Substance and Sexual Activity   • Alcohol use: Yes     Alcohol/week: 4.0 standard drinks     Types: 4 Shots of liquor per week     Comment: a day   • Drug use: Never   • Sexual activity: Defer       History reviewed. No pertinent family history.    Allergies   Allergen Reactions   • Penicillins Unknown - High Severity       ROS:   Constitutional:  Admit fatigue, tiredness.    Eyes:  Denies change in visual acuity   HENT:  Denies nasal congestion or sore throat   Respiratory: denies cough, shortness of breath.   Cardiovascular:  denies chest pain, edema   GI:  Denies abdominal pain, nausea, vomiting.   :  Denies Polyuria and Polydipsia  Musculoskeletal:  Denies back pain or joint pain   Integument:  Denies dry skin, rash   Neurologic:   Denies headache, focal weakness or sensory changes   Endocrine:  Denies polyuria or polydipsia   Psychiatric:  Denies depression or anxiety      Vitals:    03/02/20 1805   BP: 96/59   Pulse: 108   Resp: 16   Temp: 97.6 °F (36.4 °C)   SpO2: 96%        General Appearance:    Alert, cooperative, in no acute distress   Head:    Normocephalic, without obvious abnormality, atraumatic           Eyes:    Lids and lashes normal, conjunctivae and sclerae normal,       Throat:   No oral lesions,  oral mucosa moist. Edentulous   Neck:   No adenopathy, supple,  no thyromegaly, no   carotid bruit   Lungs:    Clear     Heart:    Regular rhythm and normal rate   Chest Wall:    No abnormalities observed   Abdomen:     Normal bowel sounds                Extremities:   Moves all extremities well, no edema               Pulses:   Pulses palpable and equal bilaterally   Skin:   Dry   Neurologic:  Psych:    No focal deficit.     AAOx3, appropriate mood, affect normal         Results Review:     I reviewed the patient's new clinical results.    Lab Results   Component Value Date    GLUCOSE 227 (H) 03/02/2020    BUN 6 03/02/2020    CREATININE 0.38 (L) 03/02/2020    EGFRIFNONA >150 03/02/2020    BCR 15.8 03/02/2020    K 3.3 (L) 03/02/2020    CO2 24.0 03/02/2020    CALCIUM 7.7 (L) 03/02/2020    ALBUMIN 2.80 (L) 03/02/2020    AST 55 (H) 03/02/2020    ALT 52 (H) 03/02/2020       Lab Results   Component Value Date    HGBA1C 12.9 (H) 03/02/2020     Lab Results   Component Value Date    CREATININE 0.38 (L) 03/02/2020     Results from last 7 days   Lab Units 03/02/20  1632 03/02/20  1157 03/02/20  1029 03/02/20  0805 03/02/20  0628 03/02/20  0400   GLUCOSE mg/dL 204* 389* 348* 202* 155* 113*       Medication Review: Reviewed.       Current Facility-Administered Medications:   •  azithromycin (ZITHROMAX) tablet 500 mg, 500 mg, Oral, Q24H, Paddy Stout DO, 500 mg at 03/02/20 1221  •  calcium carbonate (TUMS) chewable tablet 500 mg (200  mg elemental), 2 tablet, Oral, BID PRN, Chi Bustos PA-C  •  calcium gluconate 1g/50ml 0.675% NaCl IV SOLN, 1 g, Intravenous, PRN **AND** calcium gluconate 2-0.675 GM/100ML NACL IVPB, 2 g, Intravenous, PRN **AND** Calcium, Ionized, , , PRN, Chi Bustos PA-C  •  dextrose (D50W) 25 g/ 50mL Intravenous Solution 25 g, 25 g, Intravenous, Q15 Min PRN, José Miguel Regalado MD  •  dextrose (GLUTOSE) oral gel 15 g, 15 g, Oral, Q15 Min PRN, José Miguel Regalado MD  •  docusate sodium (COLACE) capsule 100 mg, 100 mg, Oral, BID PRN, Chi Bustos PA-C  •  enoxaparin (LOVENOX) syringe 40 mg, 40 mg, Subcutaneous, Q24H, Mayelin Brewster MD, 40 mg at 03/02/20 1549  •  folic acid (FOLVITE) tablet 1 mg, 1 mg, Oral, Daily, Paddy Stout DO, 1 mg at 03/02/20 0922  •  glucagon (human recombinant) (GLUCAGEN DIAGNOSTIC) injection 1 mg, 1 mg, Subcutaneous, Q15 Min PRN, José Miguel Regalado MD  •  insulin glargine (LANTUS) injection 15 Units, 15 Units, Subcutaneous, QAM, José Miguel Regalado MD, 15 Units at 03/02/20 1217  •  insulin lispro (humaLOG) injection 0-7 Units, 0-7 Units, Subcutaneous, 4x Daily With Meals & Nightly, José Miguel Regalado MD, 2 Units at 03/02/20 1650  •  insulin lispro (humaLOG) injection 5 Units, 5 Units, Subcutaneous, TID With Meals, José Miguel Regalado MD, 5 Units at 03/02/20 1649  •  ipratropium-albuterol (DUO-NEB) nebulizer solution 3 mL, 3 mL, Nebulization, Q4H PRN, Chi Bustos PA-C, 3 mL at 03/02/20 0335  •  loperamide (IMODIUM) capsule 2 mg, 2 mg, Oral, 4x Daily PRN, Chi Bustos PA-C, 2 mg at 03/02/20 0339  •  LORazepam (ATIVAN) tablet 0.5 mg, 0.5 mg, Oral, Q2H PRN **OR** LORazepam (ATIVAN) injection 0.5 mg, 0.5 mg, Intravenous, Q2H PRN **OR** LORazepam (ATIVAN) tablet 1 mg, 1 mg, Oral, Q1H PRN **OR** LORazepam (ATIVAN) injection 1 mg, 1 mg, Intravenous, Q1H PRN **OR** LORazepam (ATIVAN) injection 1 mg, 1 mg, Intravenous, Q15 Min PRN **OR** LORazepam (ATIVAN) injection 1 mg, 1  mg, Intramuscular, Q15 Min PRN, Chi Bustos PA-C  •  Magnesium Sulfate 2 gram Bolus, followed by 8 gram infusion (total Mg dose 10 grams)- Mg less than or equal to 1mg/dL, 2 g, Intravenous, PRN, 2 g at 03/02/20 1704 **OR** Magnesium Sulfate 2 gram / 50mL Infusion (GIVE X 3 BAGS TO EQUAL 6GM TOTAL DOSE) - Mg 1.1 - 1.5 mg/dl, 2 g, Intravenous, PRN, Last Rate: 25 mL/hr at 03/02/20 2000, 2 g at 03/02/20 2000 **OR** Magnesium Sulfate 4 gram infusion- Mg 1.6-1.9 mg/dL, 4 g, Intravenous, PRN, Chi Bustos PA-C  •  melatonin tablet 5 mg, 5 mg, Oral, Nightly PRN, Chi Bustos PA-C  •  nicotine (NICODERM CQ) 21 MG/24HR patch 1 patch, 1 patch, Transdermal, Nightly, Chi Bustos PA-C, 1 patch at 03/01/20 2104  •  nitroglycerin (NITROSTAT) SL tablet 0.4 mg, 0.4 mg, Sublingual, Q5 Min PRN, Chi Bustos PA-C  •  ondansetron (ZOFRAN) tablet 4 mg, 4 mg, Oral, Q6H PRN **OR** ondansetron (ZOFRAN) injection 4 mg, 4 mg, Intravenous, Q6H PRN, Chi Bustos PA-C  •  potassium & sodium phosphates (PHOS-NAK) 280-160-250 MG packet - for Phosphorus less than 1.25 mg/dL, 2 packet, Oral, Q6H PRN **OR** potassium & sodium phosphates (PHOS-NAK) 280-160-250 MG packet - for Phosphorus 1.25 - 2.5 mg/dL, 2 packet, Oral, Q6H PRN, Chi Bustos PA-C, 2 packet at 03/02/20 0343  •  potassium chloride (K-DUR,KLOR-CON) CR tablet 40 mEq, 40 mEq, Oral, PRN, Chi Bustos PA-C, 40 mEq at 03/02/20 1703  •  potassium chloride (KLOR-CON) packet 40 mEq, 40 mEq, Oral, PRN, Chi Bustos PA-C  •  potassium chloride 10 mEq in 100 mL IVPB, 10 mEq, Intravenous, Q1H PRN, Mayelin Brewster MD, Stopped at 03/01/20 2350  •  sodium chloride 0.9 % bolus 1,000 mL, 1,000 mL, Intravenous, Once, Chi Bustos PA-C  •  [COMPLETED] Insert peripheral IV, , , Once **AND** sodium chloride 0.9 % flush 10 mL, 10 mL, Intravenous, PRN, Mayelin Brewster MD  •  sodium chloride 0.9 % flush 10 mL, 10 mL,  Intravenous, Q12H, Mayelin Brewster MD, 10 mL at 03/02/20 0922  •  sodium chloride 0.9 % flush 10 mL, 10 mL, Intravenous, PRN, Mayelin Brewster MD  •  sodium chloride 0.9 % flush 10 mL, 10 mL, Intravenous, Q12H, Chi Bustos PA-C, 10 mL at 03/02/20 0841  •  sodium chloride 0.9 % flush 10 mL, 10 mL, Intravenous, PRN, Chi Bustos PA-C  •  sodium chloride 0.9 % infusion, 100 mL/hr, Intravenous, Continuous, Mayelin Brewster MD, Last Rate: 100 mL/hr at 03/02/20 0924, 100 mL/hr at 03/02/20 0924  •  sodium chloride 0.9 % with KCl 40 mEq/L infusion, 150 mL/hr, Intravenous, Continuous, Kristina Burton MD, Last Rate: 150 mL/hr at 03/02/20 0902, 150 mL/hr at 03/02/20 0902  •  THERA tablet 1 tablet, 1 tablet, Oral, Daily, Jaclyn-Egziabher, Paddy I, DO, 1 tablet at 03/02/20 0922  •  Vitamin B1 tablet 100 mg, 100 mg, Oral, Daily, Jaclyn-Egziabher, Paddy I, DO, 100 mg at 03/02/20 0922    Assessment/Plan   1.  Diabetes mellitus: Questionable type, I will treated as type I for now.  Will order C-peptide and eryn 65 antibodies.  2.  Severe hypokalemia  3.  Severe hypophosphatemia  4.  Malnutrition  5.  Lupus  6.  Abnormal TSH    Plan at this time she has been started on Lantus with Humalog as basal bolus insulin therapy along with Humalog sliding scale.  I will follow blood sugars and make further adjustments on the insulin doses.  Diabetes educator has seen her.  She will be educated on insulin training.  I will check C-peptide and eryn 65 antibodies.    For abnormal TSH I will recheck TSH with free T4 and TPO antibodies    Magnesium and phosphorus is being replaced per protocol by the hospitalist.    Thank you very much for the consultation.             José Miguel Regalado MD FACE.

## 2020-03-03 NOTE — DISCHARGE PLACEMENT REQUEST
"Smita Mancini (54 y.o. Female)     Date of Birth Social Security Number Address Home Phone MRN    1965  [address] [id number] [phone] [id number]    [employer] Marital Status          Unknown Single       Admission Date Admission Type Admitting Provider Attending Provider Department, Room/Bed    3/1/20 Emergency Paddy Stout, Paddy Phelan DO [facility] CARE, 2115/1    Discharge Date Discharge Disposition Discharge Destination                       Attending Provider:  Paddy Stout DO    Allergies:  Penicillins    Isolation:  None   Infection:  None   Code Status:  CPR    Ht:  157.5 cm (62\")   Wt:  54 kg (119 lb 0.8 oz)    Admission Cmt:  None   Principal Problem:  Hyperglycemia [R73.9]                 Active Insurance as of 3/1/2020     Primary Coverage     Payor Plan Insurance Group Employer/Plan Group    [place]      Payor Plan Address Payor Plan Phone Number Payor Plan Fax Number Effective Dates    [address]   1/1/2020 - None Entered    [place] [address]       Subscriber Name Subscriber Birth Date Member ID       SMITA MANCINI 1965 [id number]                 Emergency Contacts      (Rel.) Home Phone Work Phone Mobile Phone    CINDYMILADYS DICKENS (Daughter) -- -- [phone]              "

## 2020-03-04 ENCOUNTER — APPOINTMENT (OUTPATIENT)
Dept: CT IMAGING | Facility: HOSPITAL | Age: 55
End: 2020-03-04

## 2020-03-04 LAB
ANION GAP SERPL CALCULATED.3IONS-SCNC: 11 MMOL/L (ref 5–15)
ANION GAP SERPL CALCULATED.3IONS-SCNC: 9 MMOL/L (ref 5–15)
BUN BLD-MCNC: 4 MG/DL (ref 6–20)
BUN BLD-MCNC: 4 MG/DL (ref 6–20)
BUN BLD-MCNC: 5 MG/DL (ref 6–20)
BUN BLD-MCNC: 5 MG/DL (ref 6–20)
BUN/CREAT SERPL: 11.1 (ref 7–25)
BUN/CREAT SERPL: 11.8 (ref 7–25)
BUN/CREAT SERPL: 12.8 (ref 7–25)
BUN/CREAT SERPL: 20.8 (ref 7–25)
C PEPTIDE SERPL-MCNC: 0.5 NG/ML (ref 1.1–4.4)
CALCIUM SPEC-SCNC: 7.7 MG/DL (ref 8.6–10.5)
CALCIUM SPEC-SCNC: 7.9 MG/DL (ref 8.6–10.5)
CALCIUM SPEC-SCNC: 8.4 MG/DL (ref 8.6–10.5)
CALCIUM SPEC-SCNC: 8.4 MG/DL (ref 8.6–10.5)
CHLORIDE SERPL-SCNC: 100 MMOL/L (ref 98–107)
CHLORIDE SERPL-SCNC: 101 MMOL/L (ref 98–107)
CHLORIDE SERPL-SCNC: 101 MMOL/L (ref 98–107)
CHLORIDE SERPL-SCNC: 98 MMOL/L (ref 98–107)
CO2 SERPL-SCNC: 21 MMOL/L (ref 22–29)
CO2 SERPL-SCNC: 22 MMOL/L (ref 22–29)
CO2 SERPL-SCNC: 22 MMOL/L (ref 22–29)
CO2 SERPL-SCNC: 23 MMOL/L (ref 22–29)
CREAT BLD-MCNC: 0.24 MG/DL (ref 0.57–1)
CREAT BLD-MCNC: 0.34 MG/DL (ref 0.57–1)
CREAT BLD-MCNC: 0.36 MG/DL (ref 0.57–1)
CREAT BLD-MCNC: 0.39 MG/DL (ref 0.57–1)
GFR SERPL CREATININE-BSD FRML MDRD: >150 ML/MIN/1.73
GLUCOSE BLD-MCNC: 103 MG/DL (ref 65–99)
GLUCOSE BLD-MCNC: 141 MG/DL (ref 65–99)
GLUCOSE BLD-MCNC: 178 MG/DL (ref 65–99)
GLUCOSE BLD-MCNC: 87 MG/DL (ref 65–99)
GLUCOSE BLDC GLUCOMTR-MCNC: 126 MG/DL (ref 70–105)
GLUCOSE BLDC GLUCOMTR-MCNC: 67 MG/DL (ref 70–105)
GLUCOSE BLDC GLUCOMTR-MCNC: 70 MG/DL (ref 70–105)
GLUCOSE BLDC GLUCOMTR-MCNC: 73 MG/DL (ref 70–105)
GLUCOSE BLDC GLUCOMTR-MCNC: 78 MG/DL (ref 70–105)
GLUCOSE BLDC GLUCOMTR-MCNC: 82 MG/DL (ref 70–105)
MAGNESIUM SERPL-MCNC: 1.9 MG/DL (ref 1.6–2.6)
MAGNESIUM SERPL-MCNC: 2.1 MG/DL (ref 1.6–2.6)
MAGNESIUM SERPL-MCNC: 2.2 MG/DL (ref 1.6–2.6)
PHOSPHATE SERPL-MCNC: 3.5 MG/DL (ref 2.5–4.5)
PHOSPHATE SERPL-MCNC: 3.6 MG/DL (ref 2.5–4.5)
PHOSPHATE SERPL-MCNC: 3.7 MG/DL (ref 2.5–4.5)
POTASSIUM BLD-SCNC: 3.9 MMOL/L (ref 3.5–5.2)
POTASSIUM BLD-SCNC: 4 MMOL/L (ref 3.5–5.2)
POTASSIUM BLD-SCNC: 4 MMOL/L (ref 3.5–5.2)
POTASSIUM BLD-SCNC: 4.2 MMOL/L (ref 3.5–5.2)
SODIUM BLD-SCNC: 131 MMOL/L (ref 136–145)
SODIUM BLD-SCNC: 133 MMOL/L (ref 136–145)
THYROPEROXIDASE AB SERPL-ACNC: 17 IU/ML (ref 0–34)

## 2020-03-04 PROCEDURE — 83735 ASSAY OF MAGNESIUM: CPT | Performed by: HOSPITALIST

## 2020-03-04 PROCEDURE — 84100 ASSAY OF PHOSPHORUS: CPT | Performed by: HOSPITALIST

## 2020-03-04 PROCEDURE — 63710000001 INSULIN GLARGINE PER 5 UNITS: Performed by: INTERNAL MEDICINE

## 2020-03-04 PROCEDURE — 99232 SBSQ HOSP IP/OBS MODERATE 35: CPT | Performed by: INTERNAL MEDICINE

## 2020-03-04 PROCEDURE — 82962 GLUCOSE BLOOD TEST: CPT

## 2020-03-04 PROCEDURE — 25010000002 LEVOFLOXACIN PER 250 MG: Performed by: HOSPITALIST

## 2020-03-04 PROCEDURE — 97110 THERAPEUTIC EXERCISES: CPT

## 2020-03-04 PROCEDURE — 99232 SBSQ HOSP IP/OBS MODERATE 35: CPT | Performed by: HOSPITALIST

## 2020-03-04 PROCEDURE — 80048 BASIC METABOLIC PNL TOTAL CA: CPT | Performed by: HOSPITALIST

## 2020-03-04 PROCEDURE — 97530 THERAPEUTIC ACTIVITIES: CPT

## 2020-03-04 PROCEDURE — 70487 CT MAXILLOFACIAL W/DYE: CPT

## 2020-03-04 PROCEDURE — 25010000002 ENOXAPARIN PER 10 MG: Performed by: HOSPITALIST

## 2020-03-04 PROCEDURE — 63710000001 INSULIN LISPRO (HUMAN) PER 5 UNITS: Performed by: INTERNAL MEDICINE

## 2020-03-04 PROCEDURE — 97116 GAIT TRAINING THERAPY: CPT

## 2020-03-04 PROCEDURE — 0 IOPAMIDOL PER 1 ML: Performed by: HOSPITALIST

## 2020-03-04 RX ORDER — INSULIN GLARGINE 100 [IU]/ML
10 INJECTION, SOLUTION SUBCUTANEOUS EVERY MORNING
Status: DISCONTINUED | OUTPATIENT
Start: 2020-03-05 | End: 2020-03-05

## 2020-03-04 RX ADMIN — OXYCODONE HYDROCHLORIDE 5 MG: 5 TABLET ORAL at 08:29

## 2020-03-04 RX ADMIN — METRONIDAZOLE 500 MG: 500 INJECTION, SOLUTION INTRAVENOUS at 20:13

## 2020-03-04 RX ADMIN — IOPAMIDOL 50 ML: 755 INJECTION, SOLUTION INTRAVENOUS at 15:15

## 2020-03-04 RX ADMIN — Medication 10 ML: at 09:08

## 2020-03-04 RX ADMIN — Medication 10 ML: at 20:14

## 2020-03-04 RX ADMIN — METRONIDAZOLE 500 MG: 500 INJECTION, SOLUTION INTRAVENOUS at 05:35

## 2020-03-04 RX ADMIN — ACETAMINOPHEN 650 MG: 325 TABLET, FILM COATED ORAL at 16:17

## 2020-03-04 RX ADMIN — THERA TABS 1 TABLET: TAB at 08:29

## 2020-03-04 RX ADMIN — OXYCODONE HYDROCHLORIDE 5 MG: 5 TABLET ORAL at 03:16

## 2020-03-04 RX ADMIN — ENOXAPARIN SODIUM 40 MG: 40 INJECTION SUBCUTANEOUS at 16:17

## 2020-03-04 RX ADMIN — Medication 100 MG: at 08:29

## 2020-03-04 RX ADMIN — INSULIN GLARGINE 14 UNITS: 100 INJECTION, SOLUTION SUBCUTANEOUS at 08:29

## 2020-03-04 RX ADMIN — LEVOFLOXACIN 750 MG: 5 INJECTION, SOLUTION INTRAVENOUS at 13:01

## 2020-03-04 RX ADMIN — METRONIDAZOLE 500 MG: 500 INJECTION, SOLUTION INTRAVENOUS at 14:53

## 2020-03-04 RX ADMIN — OXYCODONE HYDROCHLORIDE 5 MG: 5 TABLET ORAL at 13:01

## 2020-03-04 RX ADMIN — Medication 10 ML: at 20:17

## 2020-03-04 RX ADMIN — OXYCODONE HYDROCHLORIDE 5 MG: 5 TABLET ORAL at 19:11

## 2020-03-04 RX ADMIN — LOPERAMIDE HYDROCHLORIDE 2 MG: 2 CAPSULE ORAL at 20:40

## 2020-03-04 RX ADMIN — NICOTINE 1 PATCH: 21 PATCH TRANSDERMAL at 20:16

## 2020-03-04 RX ADMIN — INSULIN LISPRO 4 UNITS: 100 INJECTION, SOLUTION INTRAVENOUS; SUBCUTANEOUS at 08:30

## 2020-03-04 RX ADMIN — FOLIC ACID 1 MG: 1 TABLET ORAL at 08:29

## 2020-03-04 RX ADMIN — OXYCODONE HYDROCHLORIDE 5 MG: 5 TABLET ORAL at 23:12

## 2020-03-04 RX ADMIN — LEVOTHYROXINE SODIUM 25 MCG: 25 TABLET ORAL at 05:35

## 2020-03-04 NOTE — THERAPY TREATMENT NOTE
Patient Name: Smita Steele  : 1965    MRN: [id number]                              Today's Date: 3/4/2020       Admit Date: 3/1/2020    Visit Dx:     ICD-10-CM ICD-9-CM   1. Hypokalemia, inadequate intake E87.6 276.8   2. Non-intractable vomiting with nausea, unspecified vomiting type R11.2 787.01   3. Diarrhea, unspecified type R19.7 787.91   4. Dehydration E86.0 276.51   5. Hyperglycemia R73.9 790.29   6. Oral thrush B37.0 112.0     Patient Active Problem List   Diagnosis   • Hypokalemia, inadequate intake   • Hyperglycemia   • Tobacco abuse   • Alcoholism /alcohol abuse (CMS/HCC)   • Hyponatremia   • Transaminitis   • Insomnia   • Hypophosphatemia   • Starvation ketoacidosis   • Type 2 diabetes mellitus (CMS/HCC)     Past Medical History:   Diagnosis Date   • Lupus (CMS/HCC)      Past Surgical History:   Procedure Laterality Date   •  SECTION     • CHOLECYSTECTOMY     • HYSTERECTOMY       General Information     Row Name 20 161          PT Evaluation Time/Intention    Document Type  therapy note (daily note)  -     Mode of Treatment  physical therapy  -     Row Name 20 161          Safety Issues, Functional Mobility    Impairments Affecting Function (Mobility)  balance;endurance/activity tolerance;strength;postural/trunk control  -       User Key  (r) = Recorded By, (t) = Taken By, (c) = Cosigned By    Initials Name Provider Type     Adeline Conway PTA Physical Therapy Assistant        Mobility     Row Name 20 161          Bed Mobility Assessment/Treatment    Supine-Sit [place] (Bed Mobility)  supervision  -     Sit-Supine [place] (Bed Mobility)  supervision  -     Assistive Device (Bed Mobility)  head of bed elevated  -     Row Name 20 161          Sit-Stand Transfer    Sit-Stand [place] (Transfers)  contact guard;verbal cues  -     Assistive Device (Sit-Stand Transfers)  walker, front-wheeled  -     Row Name 20 161           Gait/Stairs Assessment/Training    Gait/Stairs Assessment/Training  gait/ambulation independence;gait/ambulation assistive device  -     [place] Level (Gait)  contact guard;minimum assist (75% patient effort)  -     Assistive Device (Gait)  walker, front-wheeled  -     Distance in Feet (Gait)  40' with RWX 40' without RWX  -     Pattern (Gait)  step-through;step-to  -SM     Deviations/Abnormal Patterns (Gait)  festinating/shuffling;gait speed decreased;ataxic  -     Comment (Gait/Stairs)  slowed padmini, impaired balance, improved with use of AD, functioning significantly below PLOF.   -       User Key  (r) = Recorded By, (t) = Taken By, (c) = Cosigned By    Initials Name Provider Type     Adeline Conway PTA Physical Therapy Assistant        Obj/Interventions     Row Name 03/04/20 1615          Static Sitting Balance    Level of [place] (Unsupported Sitting, Static Balance)  supervision  -     Sitting Position (Unsupported Sitting, Static Balance)  sitting on edge of bed  -     Time Able to Maintain Position (Unsupported Sitting, Static Balance)  1 to 2 minutes  -     Row Name 03/04/20 1615          Dynamic Sitting Balance    Level of [place], Reaches Outside Midline (Sitting, Dynamic Balance)  supervision  -     Sitting Position, Reaches Outside Midline (Sitting, Dynamic Balance)  sitting on edge of bed  -     Row Name 03/04/20 1615          Static Standing Balance    Level of [place] (Supported Standing, Static Balance)  contact guard assist  -     Time Able to Maintain Position (Supported Standing, Static Balance)  15 to 30 seconds  -     Assistive Device Utilized (Supported Standing, Static Balance)  walker, rolling  -     Row Name 03/04/20 1615          Dynamic Standing Balance    Level of [place], Reaches Outside Midline (Standing, Dynamic Balance)  contact guard assist;minimal assist, 75% patient effort  -     Time Able to Maintain Position,  "Reaches Outside Midline (Standing, Dynamic Balance)  1 to 2 minutes  -     Assistive Device Utilized (Supported Standing, Dynamic Balance)  walker, rolling  -       User Key  (r) = Recorded By, (t) = Taken By, (c) = Cosigned By    Initials Name Provider Type    Adeline Carolina PTA Physical Therapy Assistant        Goals/Plan     Row Name 03/04/20 1624          Bed Mobility Goal 1 (PT)    Progress/Outcomes (Bed Mobility Goal 1, PT)  good progress toward goal  -SM     Row Name 03/04/20 1624          Transfer Goal 1 (PT)    Progress/Outcome (Transfer Goal 1, PT)  good progress toward goal  -SM     Row Name 03/04/20 1624          Gait Training Goal 1 (PT)    Progress/Outcome (Gait Training Goal 1, PT)  good progress toward goal  -       User Key  (r) = Recorded By, (t) = Taken By, (c) = Cosigned By    Initials Name Provider Type    Adeline Carolina PTA Physical Therapy Assistant        Clinical Impression     Row Name 03/04/20 1615          Pain Scale: Numbers Pre/Post-Treatment    Pain Scale: Numbers, Pretreatment  0/10 - no pain  -     Pain Scale: Numbers, Post-Treatment  0/10 - no pain  -     Pre/Post Treatment Pain Comment  Denies pain, however, reports B LE's feel \"heavy and numb\" from the knees down.   -     Row Name 03/04/20 1615          Plan of Care Review    Plan of Care Reviewed With  patient  -     Progress  improving  -     Outcome Summary  PT does well with activity this date, she is able to increase ambulation distance and even trials ambulation without AD. When RWX is removed, pts imbalance is significantly profound with reduced trunk rotation and increased difficulty with toe off to heel strike RICKY. Pt is not safe to d/c home where she is alone a significant amount of time. Will need IP Rehab at d/c.   -     Row Name 03/04/20 1615          Vital Signs    Pretreatment Heart Rate (beats/min)  100  -     Intratreatment Heart Rate (beats/min)  105  -SM     Posttreatment Heart " Rate (beats/min)  100  -SM     Row Name 03/04/20 1615          Positioning and Restraints    Pre-Treatment Position  in bed  -SM     Post Treatment Position  bed  -SM     In Bed  notified nsg;call light within reach;encouraged to call for assist  -SM       User Key  (r) = Recorded By, (t) = Taken By, (c) = Cosigned By    Initials Name Provider Type    Adeline Carolina PTA Physical Therapy Assistant        Outcome Measures     Row Name 03/04/20 1624          How much help from another person do you currently need...    Turning from your back to your side while in flat bed without using bedrails?  3  -SM     Moving from lying on back to sitting on the side of a flat bed without bedrails?  3  -SM     Moving to and from a bed to a chair (including a wheelchair)?  3  -SM     Standing up from a chair using your arms (e.g., wheelchair, bedside chair)?  3  -SM     Climbing 3-5 steps with a railing?  2  -SM     To walk in hospital room?  3  -SM     AM-PAC 6 Clicks Score (PT)  17  -SM       User Key  (r) = Recorded By, (t) = Taken By, (c) = Cosigned By    Initials Name Provider Type    Adeline Carolina PTA Physical Therapy Assistant          PT Recommendation and Plan     Outcome Summary/Treatment Plan (PT)  Anticipated Discharge Disposition (PT): inpatient rehabilitation facility  Plan of Care Reviewed With: patient  Progress: improving  Outcome Summary: PT does well with activity this date, she is able to increase ambulation distance and even trials ambulation without AD. When RWX is removed, pts imbalance is significantly profound with reduced trunk rotation and increased difficulty with toe off to heel strike RICKY. Pt is not safe to d/c home where she is alone a significant amount of time. Will need IP Rehab at d/c.      Time Calculation:   PT Charges     Row Name 03/04/20 1626             Time Calculation    Start Time  1540  -      Stop Time  1600  -      Time Calculation (min)  20 min  -      PT Received  On  03/04/20  -      PT - Next Appointment  03/05/20  -         Time Calculation- PT    Total Timed Code Minutes- PT  20 minute(s)  -         Timed Charges    63331 - Gait Training Minutes   10  -SM      67687 - PT Therapeutic Activity Minutes  10  -SM        User Key  (r) = Recorded By, (t) = Taken By, (c) = Cosigned By    Initials Name Provider Type    Adeline Carolina PTA Physical Therapy Assistant        Therapy Charges for Today     Code Description Service Date Service Provider Modifiers Qty    96951466032 HC GAIT TRAINING EA 15 MIN 3/4/2020 Adeline Conway, OMER GP 1    05307394838 HC PT THERAPEUTIC ACT EA 15 MIN 3/4/2020 Adeline Conway, OMER GP 1          PT G-Codes  Outcome Measure Options: AM-PAC 6 Clicks Basic Mobility (PT)  AM-PAC 6 Clicks Score (PT): 17    Adeline Conway PTA  3/4/2020

## 2020-03-04 NOTE — PLAN OF CARE
Patient had CT of face today that showed dental abscess. Plan is to discharge to [facility] once she is accepted.       Problem: Patient Care Overview  Goal: Plan of Care Review  Outcome: Ongoing (interventions implemented as appropriate)  Goal: Individualization and Mutuality  Outcome: Ongoing (interventions implemented as appropriate)  Goal: Discharge Needs Assessment  Outcome: Ongoing (interventions implemented as appropriate)  Goal: Interprofessional Rounds/Family Conf  Outcome: Ongoing (interventions implemented as appropriate)     Problem: Alcohol Withdrawal Acute, Risk/Actual (Adult)  Goal: Signs and Symptoms of Listed Potential Problems Will be Absent, Minimized or Managed (Alcohol Withdrawal Acute, Risk/Actual)  Outcome: Ongoing (interventions implemented as appropriate)     Problem: Nausea/Vomiting (Adult)  Goal: Identify Related Risk Factors and Signs and Symptoms  Outcome: Ongoing (interventions implemented as appropriate)  Goal: Symptom Relief  Outcome: Ongoing (interventions implemented as appropriate)  Goal: Adequate Hydration  Outcome: Ongoing (interventions implemented as appropriate)     Problem: Skin Injury Risk (Adult)  Goal: Identify Related Risk Factors and Signs and Symptoms  Outcome: Ongoing (interventions implemented as appropriate)  Goal: Skin Health and Integrity  Outcome: Ongoing (interventions implemented as appropriate)     Problem: Fall Risk (Adult)  Goal: Identify Related Risk Factors and Signs and Symptoms  Outcome: Ongoing (interventions implemented as appropriate)  Goal: Absence of Fall  Outcome: Ongoing (interventions implemented as appropriate)     Problem: Nutrition, Imbalanced: Inadequate Oral Intake (Adult)  Goal: Identify Related Risk Factors and Signs and Symptoms  Outcome: Ongoing (interventions implemented as appropriate)  Goal: Improved Oral Intake  Outcome: Ongoing (interventions implemented as appropriate)  Goal: Prevent Further Weight Loss  Outcome: Ongoing  (interventions implemented as appropriate)

## 2020-03-04 NOTE — PROGRESS NOTES
"      [facility] Medicine Services Daily Progress Note      Hospitalist Team  LOS 1 days      Patient Care Team:  Provider, No Known as PCP - General    Patient Location: 2115/1      Subjective   Subjective     Chief Complaint / Subjective  Chief Complaint   Patient presents with   • Weakness - Generalized         Brief Synopsis of Hospital Course/HPI      The patient is a 54 y.o. female with past medical history of lupus, alcohol and tobacco abuse who presented to [facility] ED on 3/1/2020 complaining of 9 days of nausea, vomiting, diarrhea and weakness.    The patient has not seen a physician for 3 years and is originally from [place].  She admits to drinking alcohol daily.  The patient has been falling at home because of weakness and denies focal weakness or dizziness before falls.    Date::    3/2/20: Patient received IV fluid boluses for low BP overnight.  Has not seen a physician for 3 years. Originally from [place].  History of lupus.  Not on medications at home.  Afebrile.  Claims to have several days of diarrhea and started on azithromycin for E. coli positive stool.  Endocrinology consulted.  3/3/20: Complains of right facial edema pain and oral pain.  Changed antibiotics to Levaquin and Flagyl.  DC azithromycin.  Blood glucose better controlled.      Review of Systems   All other systems reviewed and are negative.        Objective   Objective      Vital Signs  Temp:  [98.1 °F (36.7 °C)-98.6 °F (37 °C)] 98.3 °F (36.8 °C)  Heart Rate:  [] 103  Resp:  [16-20] 18  BP: ()/(64-88) 92/67  Oxygen Therapy  SpO2: 97 %  Pulse Oximetry Type: Intermittent  Device (Oxygen Therapy): room air  Flow (L/min): 2  Flowsheet Rows      First Filed Value   Admission Height  157.5 cm (62\") Documented at 03/01/2020 1345   Admission Weight  47.8 kg (105 lb 6.1 oz) Documented at 03/01/2020 1345        Intake & Output (last 3 days)       03/01 0701 - 03/02 0700 03/02 0701 - 03/03 0700 03/03 " 0701 - 03/04 0700    P.O. 0 1100 500    I.V. (mL/kg)  100 (1.9)     Total Intake(mL/kg) 0 (0) 1200 (22.2) 500 (9.3)    Urine (mL/kg/hr) 1100 450 (0.3)     Stool 0 0     Total Output 1100 450     Net -1100 +750 +500           Urine Unmeasured Occurrence  4 x 1 x    Stool Unmeasured Occurrence 3 x 5 x 3 x        Lines, Drains & Airways    Active LDAs     Name:   Placement date:   Placement time:   Site:   Days:    Peripheral IV 03/01/20 1429 Left Antecubital   03/01/20    1429    Antecubital   1    Peripheral IV 03/02/20 1200 Left;Posterior Hand   03/02/20    1200    Hand   less than 1                  Physical Exam:    Physical Exam   Constitutional: She is oriented to person, place, and time. She appears well-developed.   HENT:   Head: Normocephalic.   Mouth/Throat: Mucous membranes are dry.   Eyes: Pupils are equal, round, and reactive to light.   Neck: Trachea normal and full passive range of motion without pain.   Cardiovascular: Normal rate and regular rhythm.   Pulmonary/Chest: Effort normal and breath sounds normal.   Musculoskeletal:   Moves all extremities.   Lymphadenopathy:     She has no cervical adenopathy.   Neurological: She is alert and oriented to person, place, and time. No cranial nerve deficit.   Skin: Skin is warm and dry.   Psychiatric: She has a normal mood and affect. Her speech is normal and behavior is normal. Cognition and memory are normal.     Procedures: None      Results Review:     I reviewed the patient's new clinical results.      Lab Results (last 24 hours)     Procedure Component Value Units Date/Time    Magnesium [[id number]]  (Normal) Collected:  03/03/20 1758    Specimen:  Blood Updated:  03/03/20 1901     Magnesium 1.9 mg/dL     Basic Metabolic Panel [[id number]] Collected:  03/03/20 1758    Specimen:  Blood Updated:  03/03/20 1816    Phosphorus [[id number]] Collected:  03/03/20 1758    Specimen:  Blood Updated:  03/03/20 1816    POC Glucose Once [[id number]]  (Normal) Collected:   03/03/20 1633    Specimen:  Blood Updated:  03/03/20 1645     Glucose 102 mg/dL      Comment: Serial Number: [id number]:  [id number]       Phosphorus [[id number]]  (Abnormal) Collected:  03/03/20 1224    Specimen:  Blood Updated:  03/03/20 1329     Phosphorus 2.3 mg/dL     Basic Metabolic Panel [[id number]]  (Abnormal) Collected:  03/03/20 1224    Specimen:  Blood Updated:  03/03/20 1329     Glucose 77 mg/dL      BUN 5 mg/dL      Creatinine 0.35 mg/dL      Sodium 134 mmol/L      Potassium 4.2 mmol/L      Chloride 100 mmol/L      CO2 23.0 mmol/L      Calcium 7.6 mg/dL      eGFR Non African Amer >150 mL/min/1.73      BUN/Creatinine Ratio 14.3     Anion Gap 11.0 mmol/L     Narrative:       GFR Normal >60  Chronic Kidney Disease <60  Kidney Failure <15      Magnesium [[id number]]  (Normal) Collected:  03/03/20 1224    Specimen:  Blood Updated:  03/03/20 1312     Magnesium 2.4 mg/dL     POC Glucose Once [[id number]]  (Normal) Collected:  03/03/20 1129    Specimen:  Blood Updated:  03/03/20 1154     Glucose 99 mg/dL      Comment: Serial Number: [id number]:  [id number]       CBC & Differential [[id number]] Collected:  03/03/20 0249    Specimen:  Blood Updated:  03/03/20 0800    Narrative:       The following orders were created for panel order CBC & Differential.  Procedure                               Abnormality         Status                     ---------                               -----------         ------                     CBC Auto Differential[[id number]]        Abnormal            Final result                 Please view results for these tests on the individual orders.    CBC Auto Differential [[id number]]  (Abnormal) Collected:  03/03/20 0249    Specimen:  Blood Updated:  03/03/20 0800     WBC 8.80 10*3/mm3      RBC 2.95 10*6/mm3      Hemoglobin 11.1 g/dL      Hematocrit 30.9 %      .7 fL      MCH 37.5 pg      MCHC 35.8 g/dL      RDW 12.9 %      RDW-SD 46.8 fl      MPV 10.9 fL      Platelets 202  10*3/mm3     Scan Slide [[id number]] Collected:  03/03/20 0249    Specimen:  Blood Updated:  03/03/20 0800     Scan Slide --     Comment: See Manual Differential Results       Manual Differential [[id number]]  (Abnormal) Collected:  03/03/20 0249    Specimen:  Blood Updated:  03/03/20 0800     Neutrophil % 65.0 %      Lymphocyte % 27.0 %      Monocyte % 2.0 %      Eosinophil % 1.0 %      Bands %  5.0 %      Neutrophils Absolute 6.16 10*3/mm3      Lymphocytes Absolute 2.38 10*3/mm3      Monocytes Absolute 0.18 10*3/mm3      Eosinophils Absolute 0.09 10*3/mm3      nRBC 1.0 /100 WBC      Macrocytes Slight/1+     Toxic Granulation Slight/1+     Platelet Morphology Normal    Fecal Lactoferrin - Stool, Per Rectum [[id number]]  (Normal) Collected:  03/03/20 0414    Specimen:  Stool from Per Rectum Updated:  03/03/20 0751     Lactoferrin, Qual Negative    Calcium, Ionized [[id number]]  (Abnormal) Collected:  03/03/20 0615    Specimen:  Blood Updated:  03/03/20 0727     Ionized Calcium 1.08 mmol/L     POC Glucose Once [[id number]]  (Normal) Collected:  03/03/20 0708    Specimen:  Blood Updated:  03/03/20 0711     Glucose 95 mg/dL      Comment: Serial Number: [id number]:  [id number]       Comprehensive Metabolic Panel [[id number]]  (Abnormal) Collected:  03/03/20 0249    Specimen:  Blood Updated:  03/03/20 0352     Glucose 125 mg/dL      BUN 5 mg/dL      Creatinine 0.34 mg/dL      Sodium 136 mmol/L      Potassium 3.1 mmol/L      Chloride 98 mmol/L      CO2 26.0 mmol/L      Calcium 7.9 mg/dL      Total Protein 4.9 g/dL      Albumin 2.90 g/dL      ALT (SGPT) 62 U/L      AST (SGOT) 81 U/L      Alkaline Phosphatase 137 U/L      Total Bilirubin 0.9 mg/dL      eGFR Non African Amer >150 mL/min/1.73      Globulin 2.0 gm/dL      A/G Ratio 1.5 g/dL      BUN/Creatinine Ratio 14.7     Anion Gap 12.0 mmol/L     Narrative:       GFR Normal >60  Chronic Kidney Disease <60  Kidney Failure <15      Procalcitonin [[id number]]  (Abnormal)  "Collected:  03/03/20 0249    Specimen:  Blood Updated:  03/03/20 0351     Procalcitonin 1.04 ng/mL     Narrative:       As a Marker for Sepsis (Non-Neonates):   1. <0.5 ng/mL represents a low risk of severe sepsis and/or septic shock.  1. >2 ng/mL represents a high risk of severe sepsis and/or septic shock.    As a Marker for Lower Respiratory Tract Infections that require antibiotic therapy:  PCT on Admission     Antibiotic Therapy             6-12 Hrs later  > 0.5                Strongly Recommended            >0.25 - <0.5         Recommended  0.1 - 0.25           Discouraged                   Remeasure/reassess PCT  <0.1                 Strongly Discouraged          Remeasure/reassess PCT      As 28 day mortality risk marker: \"Change in Procalcitonin Result\" (> 80 % or <=80 %) if Day 0 (or Day 1) and Day 4 values are available. Refer to [email or web address]   Change in PCT <=80 %   A decrease of PCT levels below or equal to 80 % defines a positive change in PCT test result representing a higher risk for 28-day all-cause mortality of patients diagnosed with severe sepsis or septic shock.  Change in PCT > 80 %   A decrease of PCT levels of more than 80 % defines a negative change in PCT result representing a lower risk for 28-day all-cause mortality of patients diagnosed with severe sepsis or septic shock.                Results may be falsely decreased if patient taking Biotin.     Magnesium [[id number]]  (Abnormal) Collected:  03/03/20 0249    Specimen:  Blood Updated:  03/03/20 0351     Magnesium 3.1 mg/dL      Comment: Result checked        Phosphorus [[id number]]  (Abnormal) Collected:  03/03/20 0249    Specimen:  Blood Updated:  03/03/20 0350     Phosphorus 0.9 mg/dL     T4, Free [[id number]]  (Abnormal) Collected:  03/03/20 0249    Specimen:  Blood Updated:  03/03/20 0350     Free T4 0.73 ng/dL     Narrative:       Results may be falsely increased if patient taking Biotin.      TSH [[id number]]  " (Abnormal) Collected:  03/03/20 0249    Specimen:  Blood Updated:  03/03/20 0350     TSH 6.680 uIU/mL     C-reactive Protein [[id number]]  (Abnormal) Collected:  03/03/20 0249    Specimen:  Blood Updated:  03/03/20 0349     C-Reactive Protein 1.39 mg/dL     Phosphorus [[id number]]  (Abnormal) Collected:  03/03/20 0249    Specimen:  Blood Updated:  03/03/20 0333     Phosphorus 1.0 mg/dL     Potassium [[id number]]  (Abnormal) Collected:  03/03/20 0249    Specimen:  Blood Updated:  03/03/20 0328     Potassium 3.2 mmol/L     C-Peptide [[id number]] Collected:  03/03/20 0249    Specimen:  Blood Updated:  03/03/20 0302    Thyroid Peroxidase Antibody [[id number]] Collected:  03/03/20 0249    Specimen:  Blood Updated:  03/03/20 0302    Glutamic Acid Decarboxylase [[id number]] Collected:  03/03/20 0249    Specimen:  Blood Updated:  03/03/20 0302    Basic Metabolic Panel [[id number]]  (Abnormal) Collected:  03/03/20 0024    Specimen:  Blood Updated:  03/03/20 0102     Glucose 130 mg/dL      BUN 6 mg/dL      Creatinine 0.34 mg/dL      Sodium 136 mmol/L      Potassium 3.4 mmol/L      Chloride 99 mmol/L      CO2 24.0 mmol/L      Calcium 8.0 mg/dL      eGFR Non African Amer >150 mL/min/1.73      BUN/Creatinine Ratio 17.6     Anion Gap 13.0 mmol/L     Narrative:       GFR Normal >60  Chronic Kidney Disease <60  Kidney Failure <15      POC Glucose Once [[id number]]  (Abnormal) Collected:  03/02/20 2115    Specimen:  Blood Updated:  03/02/20 2116     Glucose 124 mg/dL      Comment: Serial Number: [id number]:  [id number]           Hemoglobin A1C   Date Value Ref Range Status   03/02/2020 12.9 (H) 3.5 - 5.6 % Final     Results from last 7 days   Lab Units 03/01/20  1432   INR  0.98           Lab Results   Component Value Date    LIPASE 39 03/01/2020     Lab Results   Component Value Date    CHOL 98 03/02/2020    TRIG 163 (H) 03/02/2020    HDL 38 (L) 03/02/2020    LDL 27 03/02/2020       No results found for: INTRAOP, PREDX, FINALDX,  COMDX    Microbiology Results (last 10 days)     Procedure Component Value - Date/Time    Gastrointestinal Panel, PCR - Stool, Per Rectum [[id number]]  (Abnormal) Collected:  03/02/20 0440    Lab Status:  Final result Specimen:  Stool from Per Rectum Updated:  03/02/20 0841     Campylobacter Not Detected     Plesiomonas shigelloides Not Detected     Salmonella Not Detected     Vibrio Not Detected     Vibrio cholerae Not Detected     Yersinia enterocolitica Not Detected     Enteroaggregative E. coli (EAEC) Detected     Enteropathogenic E. coli (EPEC) Not Detected     Enterotoxigenic E. coli (ETEC) lt/st Not Detected     Shiga-like toxin-producing E. coli (STEC) stx1/stx2 Not Detected     E. coli O157 Not Detected     Shigella/Enteroinvasive E. coli (EIEC) Not Detected     Cryptosporidium Not Detected     Cyclospora cayetanensis Not Detected     Entamoeba histolytica Not Detected     Giardia lamblia Not Detected     Adenovirus F40/41 Not Detected     Astrovirus Not Detected     Norovirus GI/GII Not Detected     Rotavirus A Not Detected     Sapovirus (I, II, IV or V) Not Detected    Narrative:       If Aeromonas, Staphylococcus aureus or Bacillus cereus are suspected, please order [id number]: Stool Culture, Aeromonas, S aureus, B Cereus.    Respiratory Panel, PCR - Swab, Nasopharynx [[id number]]  (Normal) Collected:  03/02/20 0434    Lab Status:  Final result Specimen:  Swab from Nasopharynx Updated:  03/02/20 0629     ADENOVIRUS, PCR Not Detected     Coronavirus 229E Not Detected     Coronavirus HKU1 Not Detected     Coronavirus NL63 Not Detected     Coronavirus OC43 Not Detected     Human Metapneumovirus Not Detected     Human Rhinovirus/Enterovirus Not Detected     Influenza B PCR Not Detected     Parainfluenza Virus 1 Not Detected     Parainfluenza Virus 2 Not Detected     Parainfluenza Virus 3 Not Detected     Parainfluenza Virus 4 Not Detected     Bordetella pertussis pcr Not Detected     Influenza A H1 2009 PCR Not  Detected     Chlamydophila pneumoniae PCR Not Detected     Mycoplasma pneumo by PCR Not Detected     Influenza A PCR Not Detected     Influenza A H3 Not Detected     Influenza A H1 Not Detected     RSV, PCR Not Detected    Urine Culture - Urine, Urine, Clean Catch [[id number]] Collected:  03/01/20 1530    Lab Status:  Final result Specimen:  Urine, Clean Catch Updated:  03/02/20 1634     Urine Culture >100,000 CFU/mL Mixed Emre Isolated    Narrative:       Specimen contains mixed organisms of questionable pathogenicity which indicates contamination with commensal emre.  Further identification is unlikely to provide clinically useful information.  Suggest recollection.          ECG/EMG Results (most recent)     Procedure Component Value Units Date/Time    ECG 12 Lead [[id number]]  (Abnormal) Resulted:  03/01/20 1624     Updated:  03/01/20 1624    ECG 12 Lead [[id number]] Collected:  03/02/20 0301     Updated:  03/02/20 0302    Narrative:       HEART RATE= 94  bpm  RR Interval= 636  ms  UT Interval= 130  ms  P Horizontal Axis= -10  deg  P Front Axis= 77  deg  QRSD Interval= 85  ms  QT Interval= 431  ms  QRS Axis= 82  deg  T Wave Axis= 265  deg  - ABNORMAL ECG -  Sinus rhythm  Repol abnrm suggests ischemia, diffuse leads  Prolonged QT interval  Electronically Signed By:   Date and Time of Study: 2020-03-02 03:01:10    ECG 12 Lead [[id number]] Collected:  03/01/20 1405     Updated:  03/02/20 0726    Narrative:       HEART RATE= 93  bpm  RR Interval= 644  ms  UT Interval= 136  ms  P Horizontal Axis= -4  deg  P Front Axis= 68  deg  QRSD Interval= 84  ms  QT Interval= 393  ms  QRS Axis= 75  deg  T Wave Axis= 262  deg  - ABNORMAL ECG -  Sinus rhythm  LAE, consider biatrial enlargement  Repol abnrm suggests ischemia, diffuse leads  Electronically Signed By: Berlin Smith (David) 02-Mar-2020 07:26:06  Date and Time of Study: 2020-03-01 14:05:28                    Us Gallbladder    Result Date: 3/2/2020  1. Cholecystectomy.  Normal caliber of the biliary tree. 2. Severe hepatic steatosis. 3. The right kidney shows no evidence of shadowing stone or hydronephrosis. Electronically signed by:  Mike Valdes M.D.  3/2/2020 1:11 AM    Xr Chest 1 View    Result Date: 3/2/2020  Impression: 1. Prominence of bronchopulmonary markings which can be seen in viral airway disease.  Please correlate for bronchitis. 2. No focal infiltrate. Electronically signed by:  Marsha Omalley M.D.  3/2/2020 1:47 AM          Xrays, labs reviewed personally by physician.    Medication Review:   I have reviewed the patient's current medication list      Scheduled Meds    enoxaparin 40 mg Subcutaneous Q24H   folic acid 1 mg Oral Daily   [START ON 3/4/2020] insulin glargine 14 Units Subcutaneous QAM   insulin lispro 0-7 Units Subcutaneous TID With Meals   [START ON 3/4/2020] insulin lispro 4 Units Subcutaneous TID With Meals   levoFLOXacin 750 mg Intravenous Q24H   [START ON 3/4/2020] levothyroxine 25 mcg Oral Q AM   metroNIDAZOLE 500 mg Intravenous Q8H   nicotine 1 patch Transdermal Nightly   potassium phosphate 45 mmol Intravenous Once   sodium chloride 1,000 mL Intravenous Once   sodium chloride 10 mL Intravenous Q12H   sodium chloride 10 mL Intravenous Q12H   THERA 1 tablet Oral Daily   thiamine 100 mg Oral Daily       Meds Infusions    sodium chloride 100 mL/hr Last Rate: 100 mL/hr (03/02/20 0924)       Meds PRN  •  acetaminophen  •  calcium carbonate  •  Calcium Gluconate-NaCl **AND** calcium gluconate **AND** Calcium, Ionized  •  dextrose  •  dextrose  •  docusate sodium  •  glucagon (human recombinant)  •  ipratropium-albuterol  •  loperamide  •  LORazepam **OR** LORazepam **OR** LORazepam **OR** LORazepam **OR** LORazepam **OR** LORazepam  •  magnesium sulfate **OR** magnesium sulfate **OR** magnesium sulfate  •  melatonin  •  nitroglycerin  •  ondansetron **OR** ondansetron  •  oxyCODONE  •  potassium & sodium phosphates **OR** potassium & sodium  phosphates  •  potassium chloride  •  potassium chloride  •  potassium chloride  •  [COMPLETED] Insert peripheral IV **AND** sodium chloride  •  sodium chloride  •  sodium chloride      Assessment/Plan   Assessment/Plan     Active Hospital Problems    Diagnosis  POA   • **Hyperglycemia [R73.9]  Yes     Priority: High   • Hypophosphatemia [E83.39]  Yes     Priority: High   • Starvation ketoacidosis [E87.2]  Yes     Priority: High   • Hypokalemia, inadequate intake [E87.6]  Yes     Priority: High   • Alcoholism /alcohol abuse (CMS/HCC) [F10.20]  Yes     Priority: High   • Hyponatremia [E87.1]  Yes     Priority: High   • Transaminitis [R74.0]  Yes     Priority: Medium   • Type 2 diabetes mellitus (CMS/HCC) [E11.9]  Yes   • Tobacco abuse [Z72.0]  Unknown   • Insomnia [G47.00]  Yes      Resolved Hospital Problems   No resolved problems to display.       MEDICAL DECISION MAKING COMPLEXITY BY PROBLEM:     1.  New onset diabetes mellitus:  -Hemoglobin A1c 12.9  -Continue Lantus, mealtime insulin ISS   -Endocrinology and diabetic nurse educator consulted     2.  Infectious diarrhea:  -Started on azithromycin then changed to Levaquin and Flagyl  -Lomotil as needed  -Continue antiemetics    3.  Right facial cellulitis:  -Possible dental caries/abscess  -Onset after admission  -PCN allergy therefore Levaquin and Flagyl for now    4.  New hypothyroidism:  -Started on Synthroid by endocrinology    5. Hypokalemia and hypophosphatemia:  -Replacement protocol ordered  -Consult nutrition for calorie count     6.  Alcohol abuse  -[facility] protocol ordered  -Case management consult placed     7.  Tobacco abuse  -Nicotine patch ordered    8.  Lupus:  -Has not been on Plaquenil for 3 years           VTE Prophylaxis -   Mechanical Order History:     None      Pharmalogical Order History:     Ordered     Dose Route Frequency Stop    03/02/20 0821  enoxaparin (LOVENOX) syringe 40 mg      40 mg SC Every 24 Hours --    03/01/20 2030  enoxaparin  (LOVENOX) syringe 30 mg  Status:  Discontinued      30 mg SC Daily 03/02/20 0835            Code Status -   Code Status and Medical Interventions:   Ordered at: 03/01/20 5625     Level Of Support Discussed With:    Patient     Code Status:    CPR     Medical Interventions (Level of Support Prior to Arrest):    Full       Discharge Planning DC to rehab when bed available.          Destination      Coordination has not been started for this encounter.      Durable Medical Equipment      Coordination has not been started for this encounter.      Dialysis/Infusion      Coordination has not been started for this encounter.      Home Medical Care      Coordination has not been started for this encounter.      Therapy      Coordination has not been started for this encounter.      Community Resources      Coordination has not been started for this encounter.            Electronically signed by Paddy Stout DO, 03/03/20, 7:08 PM.  [facility] Hospitalist Team

## 2020-03-04 NOTE — PROGRESS NOTES
Continued Stay Note  [facility]     Patient Name: Smita Steele  MRN: [id number]  Today's Date: 3/4/2020    Admit Date: 3/1/2020    Discharge Plan     Row Name 03/04/20 1148       Plan    Plan  ASIA Plan: [facility] Accepted. PASRR approved. No precert required (E insurance).        Discharge Codes    No documentation.             Aicha Nevarez RN

## 2020-03-04 NOTE — PLAN OF CARE
Problem: Patient Care Overview  Goal: Plan of Care Review  Flowsheets (Taken 3/4/2020 2115)  Progress: improving  Plan of Care Reviewed With: patient  Outcome Summary: PT does well with activity this date, she is able to increase ambulation distance and even trials ambulation without AD. When RWX is removed, pts imbalance is significantly profound with reduced trunk rotation and increased difficulty with toe off to heel strike RICKY. Pt is not safe to d/c home where she is alone a significant amount of time. Will need IP Rehab at d/c.

## 2020-03-04 NOTE — NURSING NOTE
Report given to Dayanara on MIPS. Will put in transport for pt to [facility]

## 2020-03-04 NOTE — THERAPY TREATMENT NOTE
Acute Care - Occupational Therapy Treatment Note   [facility]     Patient Name: Smita Steele  : 1965  MRN: [id number]  Today's Date: 3/4/2020             Admit Date: 3/1/2020       ICD-10-CM ICD-9-CM   1. Hypokalemia, inadequate intake E87.6 276.8   2. Non-intractable vomiting with nausea, unspecified vomiting type R11.2 787.01   3. Diarrhea, unspecified type R19.7 787.91   4. Dehydration E86.0 276.51   5. Hyperglycemia R73.9 790.29   6. Oral thrush B37.0 112.0     Patient Active Problem List   Diagnosis   • Hypokalemia, inadequate intake   • Hyperglycemia   • Tobacco abuse   • Alcoholism /alcohol abuse (CMS/HCC)   • Hyponatremia   • Transaminitis   • Insomnia   • Hypophosphatemia   • Starvation ketoacidosis   • Type 2 diabetes mellitus (CMS/HCC)     Past Medical History:   Diagnosis Date   • Lupus (CMS/HCC)      Past Surgical History:   Procedure Laterality Date   •  SECTION     • CHOLECYSTECTOMY     • HYSTERECTOMY         Therapy Treatment    Rehabilitation Treatment Summary     Row Name 20 1336             Treatment Time/Intention    Discipline  occupational therapist  -YANCY      Document Type  therapy note (daily note)  -YANCY      Subjective Information  complains of;dizziness;weakness  -YANCY      Mode of Treatment  occupational therapy  -YANCY      Care Plan Review  evaluation/treatment results reviewed  -YANCY      Therapy Frequency (OT Eval)  3 times/wk  -YANCY      Existing Precautions/Restrictions  fall  -YANCY      Recorded by [YANCY] Dana Muir OT 20 1501      Row Name 20 1336             Cognitive Assessment/Intervention- PT/OT    Orientation Status (Cognition)  oriented x 4  -YANCY      Recorded by [YANCY] Dana Muir OT 20 1501      Row Name 20 1336             Safety Issues, Functional Mobility    Safety Issues Affecting Function (Mobility)  impulsivity;insight into deficits/self awareness;judgment;positioning of assistive device  -YANCY      Impairments Affecting  Function (Mobility)  balance;endurance/activity tolerance;strength;postural/trunk control  -YANCY      Recorded by [YANCY] Dana Muir OT 03/04/20 1501      Row Name 03/04/20 1336             Bed Mobility Assessment/Treatment    Bed Mobility Assessment/Treatment  supine-sit;sit-supine  -YANCY      Supine-Sit [place] (Bed Mobility)  supervision  -YANCY      Sit-Supine [place] (Bed Mobility)  supervision  -YANCY      Assistive Device (Bed Mobility)  head of bed elevated  -YANCY      Recorded by [YANCY] Dana Muir OT 03/04/20 1501      Row Name 03/04/20 1336             Functional Mobility    Functional Mobility- Ind. Level  contact guard assist;1 person  -YANCY      Functional Mobility- Device  rolling walker  -YANCY      Recorded by [YANCY] Dana Muir OT 03/04/20 1501      Row Name 03/04/20 1336             Sit-Stand Transfer    Sit-Stand [place] (Transfers)  contact guard;verbal cues  -YANCY      Assistive Device (Sit-Stand Transfers)  walker, front-wheeled  -YANCY      Recorded by [YANCY] Dana Muir OT 03/04/20 1501      Row Name 03/04/20 1336             ADL Assessment/Intervention    BADL Assessment/Intervention  lower body dressing  -YANCY      Recorded by [YANCY] Dana Muir OT 03/04/20 1501      Row Name 03/04/20 1336             Lower Body Dressing Assessment/Training    Lower Body Dressing [place] Level  don;shoes/slippers;set up;verbal cues  -YANCY      Lower Body Dressing Position  edge of bed sitting  -YANCY      Recorded by [YANCY] Dana Muir OT 03/04/20 1501      Row Name 03/04/20 1336             BADL Safety/Performance    Impairments, BADL Safety/Performance  endurance/activity tolerance;shortness of breath;strength  -YANCY      Skilled BADL Treatment/Intervention  energy conservation;compensatory training  -YANCY      Recorded by [YANCY] Dana Muir OT 03/04/20 1501      Row Name 03/04/20 1336             Positioning and Restraints    Pre-Treatment Position  in bed  -YANCY       Post Treatment Position  bed  -YANCY      In Bed  encouraged to call for assist;call light within reach  -YANCY      Recorded by [YANCY] Dana Muir, OT 03/04/20 1501      Row Name 03/04/20 1336             Pain Assessment    Additional Documentation  Pain Scale: Numbers Pre/Post-Treatment (Group)  -YANCY      Recorded by [Dana [place], OT 03/04/20 1501      Row Name 03/04/20 1336             Pain Scale: Numbers Pre/Post-Treatment    Pain Scale: Numbers, Pretreatment  0/10 - no pain  -YANCY      Pain Scale: Numbers, Post-Treatment  0/10 - no pain  -YANCY      Recorded by [YANCY] Dana Muir, OT 03/04/20 1501      Row Name 03/04/20 1336             Coping    Observed Emotional State  accepting;calm;cooperative  -YANCY      Verbalized Emotional State  acceptance  -YANCY      Recorded by [Dana [place], OT 03/04/20 1501      Row Name 03/04/20 1336             Plan of Care Review    Plan of Care Reviewed With  patient  -YANCY      Progress  improving  -YANCY      Recorded by [Dana [place], OT 03/04/20 1501      Row Name 03/04/20 1336             Outcome Summary/Treatment Plan (OT)    Daily Summary of Progress (OT)  progress toward functional goals is good  -YANCY      Anticipated Discharge Disposition (OT)  inpatient rehabilitation facility  -YANCY      Recorded by [Dana [place], OT 03/04/20 1501        User Key  (r) = Recorded By, (t) = Taken By, (c) = Cosigned By    Initials Name Effective Dates Discipline    YANCY Dana Muir, OT 07/25/19 -  OT                 OT Recommendation and Plan  Outcome Summary/Treatment Plan (OT)  Daily Summary of Progress (OT): progress toward functional goals is good  Anticipated Discharge Disposition (OT): inpatient rehabilitation facility  Therapy Frequency (OT Eval): 3 times/wk  Daily Summary of Progress (OT): progress toward functional goals is good  Plan of Care Review  Plan of Care Reviewed With: patient  Plan of Care Reviewed With: patient  Outcome Summary:  Pt [place] activity fair, 1 run of V-tach (recovered with seated rest) and dizziness with initial two STS. Orthostatics (-). HR remained slightly tachy with activity. 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 Recommend IP rehab upon d/c.       Time Calculation:   Time Calculation- OT     Row Name 03/04/20 1507             Time Calculation- OT    OT Start Time  1336  -      OT Stop Time  1400  -      OT Time Calculation (min)  24 min  -      Total Timed Code Minutes- OT  24 minute(s)  -      OT Received On  03/04/20  -YANCY      OT - Next Appointment  03/06/20  -YANCY      OT Goal Re-Cert Due Date  03/18/20  -YANCY        User Key  (r) = Recorded By, (t) = Taken By, (c) = Cosigned By    Initials Name Provider Type    Dana Tellez OT Occupational Therapist        Therapy Charges for Today     Code Description Service Date Service Provider Modifiers Qty    [id number]  OT THERAPEUTIC ACT EA 15 MIN 3/4/2020 Dana Muir OT GO 1    [id number]  OT THER PROC EA 15 MIN 3/4/2020 Dana Muir OT GO 1               Dana Muir OT  3/4/2020

## 2020-03-04 NOTE — PROGRESS NOTES
"      [facility] Medicine Services Daily Progress Note      Hospitalist Team  LOS 2 days      Patient Care Team:  Provider, No Known as PCP - General    Patient Location: 2115/1      Subjective   Subjective     Chief Complaint / Subjective  Chief Complaint   Patient presents with   • Weakness - Generalized         Brief Synopsis of Hospital Course/HPI      The patient is a 54 y.o. female with past medical history of lupus, alcohol and tobacco abuse who presented to [facility] ED on 3/1/2020 complaining of 9 days of nausea, vomiting, diarrhea and weakness.    The patient has not seen a physician for 3 years and is originally from [place].  She admits to drinking alcohol daily.  The patient has been falling at home because of weakness and denies focal weakness or dizziness before falls.    Date::    3/2/20: Patient received IV fluid boluses for low BP overnight.  Has not seen a physician for 3 years. Originally from [place].  History of lupus.  Not on medications at home.  Afebrile.  Claims to have several days of diarrhea and started on azithromycin for E. coli positive stool.  Endocrinology consulted.  3/3/20: Complained of right facial edema pain and oral pain.  Changed antibiotics to Levaquin and Flagyl.  DC azithromycin.  Blood glucose better controlled.  3/4/20: CT facial bone shows dental abscess and caries.  Afebrile.      Review of Systems   All other systems reviewed and are negative.        Objective   Objective      Vital Signs  Temp:  [98.3 °F (36.8 °C)-99.3 °F (37.4 °C)] 98.4 °F (36.9 °C)  Heart Rate:  [] 98  Resp:  [15-18] 15  BP: ()/(67-81) 105/72  Oxygen Therapy  SpO2: 98 %  Pulse Oximetry Type: Intermittent  Device (Oxygen Therapy): room air  Flow (L/min): 2  Flowsheet Rows      First Filed Value   Admission Height  157.5 cm (62\") Documented at 03/01/2020 1345   Admission Weight  47.8 kg (105 lb 6.1 oz) Documented at 03/01/2020 1345        Intake & Output " (last 3 days)       03/01 0701 - 03/02 0700 03/02 0701 - 03/03 0700 03/03 0701 - 03/04 0700 03/04 0701 - 03/05 0700    P.O. 0 1100 500 480    I.V. (mL/kg)  100 (1.9)      IV Piggyback   100     Total Intake(mL/kg) 0 (0) 1200 (22.2) 600 (10.7) 480 (8.6)    Urine (mL/kg/hr) 1100 450 (0.3)      Stool 0 0      Total Output 1100 450      Net -1100 +750 +600 +480            Urine Unmeasured Occurrence  4 x 1 x     Stool Unmeasured Occurrence 3 x 5 x 3 x         Lines, Drains & Airways    Active LDAs     Name:   Placement date:   Placement time:   Site:   Days:    Peripheral IV 03/01/20 1429 Left Antecubital   03/01/20    1429    Antecubital   1    Peripheral IV 03/02/20 1200 Left;Posterior Hand   03/02/20    1200    Hand   less than 1                  Physical Exam:    Physical Exam   Constitutional: She is oriented to person, place, and time. She appears well-developed.   HENT:   Head: Normocephalic.   Mouth/Throat: Mucous membranes are dry.   Eyes: Pupils are equal, round, and reactive to light.   Neck: Trachea normal and full passive range of motion without pain.   Cardiovascular: Normal rate and regular rhythm.   Pulmonary/Chest: Effort normal and breath sounds normal.   Musculoskeletal:   Moves all extremities.   Lymphadenopathy:     She has no cervical adenopathy.   Neurological: She is alert and oriented to person, place, and time. No cranial nerve deficit.   Skin: Skin is warm and dry.   Psychiatric: She has a normal mood and affect. Her speech is normal and behavior is normal. Cognition and memory are normal.     Procedures: None      Results Review:     I reviewed the patient's new clinical results.      Lab Results (last 24 hours)     Procedure Component Value Units Date/Time    C-Peptide [[id number]]  (Abnormal) Collected:  03/03/20 0249    Specimen:  Blood Updated:  03/04/20 1509     C-Peptide 0.5 ng/mL      Comment: C-Peptide reference interval is for fasting patients.       Narrative:       Performed at:  01  - [facility]  [id number]  : Maged Johnston PhD, Phone:  [phone]    Basic Metabolic Panel [[id number]]  (Abnormal) Collected:  03/04/20 1315    Specimen:  Blood Updated:  03/04/20 1432     Glucose 103 mg/dL      BUN 4 mg/dL      Creatinine 0.34 mg/dL      Sodium 131 mmol/L      Potassium 4.0 mmol/L      Chloride 98 mmol/L      CO2 22.0 mmol/L      Calcium 8.4 mg/dL      eGFR Non African Amer >150 mL/min/1.73      BUN/Creatinine Ratio 11.8     Anion Gap 11.0 mmol/L     Narrative:       GFR Normal >60  Chronic Kidney Disease <60  Kidney Failure <15      Phosphorus [[id number]]  (Normal) Collected:  03/04/20 1315    Specimen:  Blood Updated:  03/04/20 1432     Phosphorus 3.5 mg/dL     Magnesium [[id number]]  (Normal) Collected:  03/04/20 1315    Specimen:  Blood Updated:  03/04/20 1432     Magnesium 2.1 mg/dL     POC Glucose Once [[id number]]  (Normal) Collected:  03/04/20 1125    Specimen:  Blood Updated:  03/04/20 1137     Glucose 70 mg/dL      Comment: Serial Number: [id number]:  [id number]       POC Glucose Once [[id number]]  (Abnormal) Collected:  03/04/20 0732    Specimen:  Blood Updated:  03/04/20 0736     Glucose 126 mg/dL      Comment: Serial Number: [id number]:  [id number]       Thyroid Peroxidase Antibody [[id number]] Collected:  03/03/20 0249    Specimen:  Blood Updated:  03/04/20 0709     Thyroid Peroxidase Antibody 17 IU/mL     Narrative:       Performed at:  01 - [facility]  [id number]  : Maged Johnston PhD, Phone:  [phone]    Basic Metabolic Panel [[id number]]  (Abnormal) Collected:  03/04/20 0537    Specimen:  Blood Updated:  03/04/20 0637     Glucose 141 mg/dL      BUN 4 mg/dL      Creatinine 0.36 mg/dL      Sodium 133 mmol/L      Potassium 3.9 mmol/L      Chloride 101 mmol/L      CO2 21.0 mmol/L      Calcium 7.9 mg/dL      eGFR Non African Amer >150 mL/min/1.73      BUN/Creatinine Ratio 11.1      Anion Gap 11.0 mmol/L     Narrative:       GFR Normal >60  Chronic Kidney Disease <60  Kidney Failure <15      Phosphorus [[id number]]  (Normal) Collected:  03/04/20 0537    Specimen:  Blood Updated:  03/04/20 0636     Phosphorus 3.7 mg/dL     Magnesium [[id number]]  (Normal) Collected:  03/04/20 0537    Specimen:  Blood Updated:  03/04/20 0636     Magnesium 1.9 mg/dL     Basic Metabolic Panel [[id number]]  (Abnormal) Collected:  03/04/20 0025    Specimen:  Blood Updated:  03/04/20 0126     Glucose 178 mg/dL      BUN 5 mg/dL      Creatinine 0.24 mg/dL      Sodium 133 mmol/L      Potassium 4.0 mmol/L      Chloride 100 mmol/L      CO2 22.0 mmol/L      Calcium 7.7 mg/dL      eGFR Non African Amer >150 mL/min/1.73      BUN/Creatinine Ratio 20.8     Anion Gap 11.0 mmol/L     Narrative:       GFR Normal >60  Chronic Kidney Disease <60  Kidney Failure <15      Phosphorus [[id number]]  (Normal) Collected:  03/04/20 0025    Specimen:  Blood Updated:  03/04/20 0126     Phosphorus 3.6 mg/dL     Magnesium [[id number]]  (Normal) Collected:  03/04/20 0025    Specimen:  Blood Updated:  03/04/20 0126     Magnesium 2.2 mg/dL     POC Glucose Once [[id number]]  (Abnormal) Collected:  03/03/20 2041    Specimen:  Blood Updated:  03/03/20 2046     Glucose 139 mg/dL      Comment: Serial Number: [id number]:  [id number]       Phosphorus [[id number]]  (Normal) Collected:  03/03/20 1758    Specimen:  Blood Updated:  03/03/20 1919     Phosphorus 4.1 mg/dL     Basic Metabolic Panel [[id number]]  (Abnormal) Collected:  03/03/20 1758    Specimen:  Blood Updated:  03/03/20 1918     Glucose 121 mg/dL      BUN 6 mg/dL      Creatinine 0.31 mg/dL      Sodium 133 mmol/L      Potassium 4.7 mmol/L      Chloride 101 mmol/L      CO2 22.0 mmol/L      Calcium 7.1 mg/dL      eGFR Non African Amer >150 mL/min/1.73      BUN/Creatinine Ratio 19.4     Anion Gap 10.0 mmol/L     Narrative:       GFR Normal >60  Chronic Kidney Disease <60  Kidney Failure <15       Magnesium [[id number]]  (Normal) Collected:  03/03/20 1758    Specimen:  Blood Updated:  03/03/20 1901     Magnesium 1.9 mg/dL         Hemoglobin A1C   Date Value Ref Range Status   03/02/2020 12.9 (H) 3.5 - 5.6 % Final     Results from last 7 days   Lab Units 03/01/20  1432   INR  0.98           Lab Results   Component Value Date    LIPASE 39 03/01/2020     Lab Results   Component Value Date    CHOL 98 03/02/2020    TRIG 163 (H) 03/02/2020    HDL 38 (L) 03/02/2020    LDL 27 03/02/2020       No results found for: INTRAOP, PREDX, FINALDX, COMDX    Microbiology Results (last 10 days)     Procedure Component Value - Date/Time    Gastrointestinal Panel, PCR - Stool, Per Rectum [[id number]]  (Abnormal) Collected:  03/02/20 0440    Lab Status:  Final result Specimen:  Stool from Per Rectum Updated:  03/02/20 0841     Campylobacter Not Detected     Plesiomonas shigelloides Not Detected     Salmonella Not Detected     Vibrio Not Detected     Vibrio cholerae Not Detected     Yersinia enterocolitica Not Detected     Enteroaggregative E. coli (EAEC) Detected     Enteropathogenic E. coli (EPEC) Not Detected     Enterotoxigenic E. coli (ETEC) lt/st Not Detected     Shiga-like toxin-producing E. coli (STEC) stx1/stx2 Not Detected     E. coli O157 Not Detected     Shigella/Enteroinvasive E. coli (EIEC) Not Detected     Cryptosporidium Not Detected     Cyclospora cayetanensis Not Detected     Entamoeba histolytica Not Detected     Giardia lamblia Not Detected     Adenovirus F40/41 Not Detected     Astrovirus Not Detected     Norovirus GI/GII Not Detected     Rotavirus A Not Detected     Sapovirus (I, II, IV or V) Not Detected    Narrative:       If Aeromonas, Staphylococcus aureus or Bacillus cereus are suspected, please order [id number]: Stool Culture, Aeromonas, S aureus, B Cereus.    Respiratory Panel, PCR - Swab, Nasopharynx [[id number]]  (Normal) Collected:  03/02/20 0434    Lab Status:  Final result Specimen:  Swab from Nasopharynx  Updated:  03/02/20 0629     ADENOVIRUS, PCR Not Detected     Coronavirus 229E Not Detected     Coronavirus HKU1 Not Detected     Coronavirus NL63 Not Detected     Coronavirus OC43 Not Detected     Human Metapneumovirus Not Detected     Human Rhinovirus/Enterovirus Not Detected     Influenza B PCR Not Detected     Parainfluenza Virus 1 Not Detected     Parainfluenza Virus 2 Not Detected     Parainfluenza Virus 3 Not Detected     Parainfluenza Virus 4 Not Detected     Bordetella pertussis pcr Not Detected     Influenza A H1 2009 PCR Not Detected     Chlamydophila pneumoniae PCR Not Detected     Mycoplasma pneumo by PCR Not Detected     Influenza A PCR Not Detected     Influenza A H3 Not Detected     Influenza A H1 Not Detected     RSV, PCR Not Detected    Urine Culture - Urine, Urine, Clean Catch [[id number]] Collected:  03/01/20 1530    Lab Status:  Final result Specimen:  Urine, Clean Catch Updated:  03/02/20 1634     Urine Culture >100,000 CFU/mL Mixed Emre Isolated    Narrative:       Specimen contains mixed organisms of questionable pathogenicity which indicates contamination with commensal emre.  Further identification is unlikely to provide clinically useful information.  Suggest recollection.          ECG/EMG Results (most recent)     Procedure Component Value Units Date/Time    ECG 12 Lead [[id number]]  (Abnormal) Resulted:  03/01/20 1624     Updated:  03/01/20 1624    ECG 12 Lead [[id number]] Collected:  03/02/20 0301     Updated:  03/02/20 0302    Narrative:       HEART RATE= 94  bpm  RR Interval= 636  ms  CA Interval= 130  ms  P Horizontal Axis= -10  deg  P Front Axis= 77  deg  QRSD Interval= 85  ms  QT Interval= 431  ms  QRS Axis= 82  deg  T Wave Axis= 265  deg  - ABNORMAL ECG -  Sinus rhythm  Repol abnrm suggests ischemia, diffuse leads  Prolonged QT interval  Electronically Signed By:   Date and Time of Study: 2020-03-02 03:01:10    ECG 12 Lead [[id number]] Collected:  03/01/20 1405     Updated:  03/02/20  0726    Narrative:       HEART RATE= 93  bpm  RR Interval= 644  ms  OK Interval= 136  ms  P Horizontal Axis= -4  deg  P Front Axis= 68  deg  QRSD Interval= 84  ms  QT Interval= 393  ms  QRS Axis= 75  deg  T Wave Axis= 262  deg  - ABNORMAL ECG -  Sinus rhythm  LAE, consider biatrial enlargement  Repol abnrm suggests ischemia, diffuse leads  Electronically Signed By: Berlin Smith (David) 02-Mar-2020 07:26:06  Date and Time of Study: 2020-03-01 14:05:28                     Gallbladder    Result Date: 3/2/2020  1. Cholecystectomy. Normal caliber of the biliary tree. 2. Severe hepatic steatosis. 3. The right kidney shows no evidence of shadowing stone or hydronephrosis. Electronically signed by:  Mike Valdes M.D.  3/2/2020 1:11 AM    Xr Chest 1 View    Result Date: 3/2/2020  Impression: 1. Prominence of bronchopulmonary markings which can be seen in viral airway disease.  Please correlate for bronchitis. 2. No focal infiltrate. Electronically signed by:  Marsha Omalley M.D.  3/2/2020 1:47 AM    Ct Facial Bones With Contrast    Result Date: 3/4/2020   Multiple dental caries and periapical lucencies consistent with dental abscesses. Dental x-rays would be more useful at outpatient follow-up.  Electronically Signed By-Josef Matson On:3/4/2020 2:43 PM This report was finalized on [id number] by  Josef Matson, .          Xrays, labs reviewed personally by physician.    Medication Review:   I have reviewed the patient's current medication list      Scheduled Meds    enoxaparin 40 mg Subcutaneous Q24H   folic acid 1 mg Oral Daily   insulin glargine 14 Units Subcutaneous QAM   insulin lispro 0-7 Units Subcutaneous TID With Meals   insulin lispro 4 Units Subcutaneous TID With Meals   levoFLOXacin 750 mg Intravenous Q24H   levothyroxine 25 mcg Oral Q AM   metroNIDAZOLE 500 mg Intravenous Q8H   nicotine 1 patch Transdermal Nightly   sodium chloride 1,000 mL Intravenous Once   sodium chloride 10 mL Intravenous Q12H   sodium  chloride 10 mL Intravenous Q12H   THERA 1 tablet Oral Daily   thiamine 100 mg Oral Daily       Meds Infusions    sodium chloride 100 mL/hr Last Rate: 100 mL/hr (03/02/20 0924)       Meds PRN  •  acetaminophen  •  calcium carbonate  •  Calcium Gluconate-NaCl **AND** calcium gluconate **AND** Calcium, Ionized  •  dextrose  •  dextrose  •  docusate sodium  •  glucagon (human recombinant)  •  ipratropium-albuterol  •  loperamide  •  LORazepam **OR** LORazepam **OR** LORazepam **OR** LORazepam **OR** LORazepam **OR** LORazepam  •  magnesium sulfate **OR** magnesium sulfate **OR** magnesium sulfate  •  melatonin  •  nitroglycerin  •  ondansetron **OR** ondansetron  •  oxyCODONE  •  potassium & sodium phosphates **OR** potassium & sodium phosphates  •  potassium chloride  •  potassium chloride  •  potassium chloride  •  [COMPLETED] Insert peripheral IV **AND** sodium chloride  •  sodium chloride  •  sodium chloride      Assessment/Plan   Assessment/Plan     Active Hospital Problems    Diagnosis  POA   • **Hyperglycemia [R73.9]  Yes     Priority: High   • Hypophosphatemia [E83.39]  Yes     Priority: High   • Starvation ketoacidosis [E87.2]  Yes     Priority: High   • Hypokalemia, inadequate intake [E87.6]  Yes     Priority: High   • Alcoholism /alcohol abuse (CMS/HCC) [F10.20]  Yes     Priority: High   • Hyponatremia [E87.1]  Yes     Priority: High   • Transaminitis [R74.0]  Yes     Priority: Medium   • Type 2 diabetes mellitus (CMS/HCC) [E11.9]  Yes   • Tobacco abuse [Z72.0]  Unknown   • Insomnia [G47.00]  Yes      Resolved Hospital Problems   No resolved problems to display.       MEDICAL DECISION MAKING COMPLEXITY BY PROBLEM:     1.  New onset diabetes mellitus:  -Hemoglobin A1c 12.9  -Continue Lantus, mealtime insulin and ISS   -Endocrinology and diabetic nurse educator consulted     2.  Infectious diarrhea:  initially started on azithromycin then changed to Levaquin and Flagyl  -Lomotil as needed  -Continue  antiemetics    3.  Right facial cellulitis:  -With dental caries/abscess  -Onset after admission  -PCN allergy therefore Levaquin and Flagyl for now  -s/p CT facial bone    4.  New hypothyroidism:  -Started on Synthroid by endocrinology    5. Hypokalemia and hypophosphatemia:  -Replacement protocol ordered  -Consult nutrition for calorie count     6.  Alcohol abuse  -CIWA protocol ordered  -Case management consult placed     7.  Tobacco abuse  -Nicotine patch ordered    8.  Lupus:  -Has not been on Plaquenil for 3 years           VTE Prophylaxis -   Mechanical Order History:     None      Pharmalogical Order History:     Ordered     Dose Route Frequency Stop    03/02/20 0821  enoxaparin (LOVENOX) syringe 40 mg      40 mg SC Every 24 Hours --    03/01/20 2030  enoxaparin (LOVENOX) syringe 30 mg  Status:  Discontinued      30 mg SC Daily 03/02/20 0835            Code Status -   Code Status and Medical Interventions:   Ordered at: 03/01/20 1654     Level Of Support Discussed With:    Patient     Code Status:    CPR     Medical Interventions (Level of Support Prior to Arrest):    Full       Discharge Planning DC to rehab when bed available.          Destination      Coordination has not been started for this encounter.      Durable Medical Equipment      Coordination has not been started for this encounter.      Dialysis/Infusion      Coordination has not been started for this encounter.      Home Medical Care      Coordination has not been started for this encounter.      Therapy      Coordination has not been started for this encounter.      Community Resources      Coordination has not been started for this encounter.            Electronically signed by Paddy Stout DO, 03/04/20, 4:47 PM.  [facility] Hospitalist Team

## 2020-03-04 NOTE — PLAN OF CARE
Problem: Patient Care Overview  Goal: Plan of Care Review  Flowsheets (Taken 3/4/2020 1502)  Progress: improving  Plan of Care Reviewed With: patient  Outcome Summary: Pt ludwin activity fair, 1 run of V-tach (recovered with seated rest) and dizziness with initial two STS. Orthostatics (-). HR remained slightly tachy with activity. 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 Recommend IP rehab upon d/c.

## 2020-03-04 NOTE — PLAN OF CARE
Problem: Patient Care Overview  Goal: Plan of Care Review  Outcome: Ongoing (interventions implemented as appropriate)  Flowsheets (Taken 3/4/2020 7687)  Progress: improving  Plan of Care Reviewed With: patient  Note:   Patient electrolyte replaced and are now WNL. Patient medical monitor over flow and has plans to go to [facility]

## 2020-03-05 LAB
ANION GAP SERPL CALCULATED.3IONS-SCNC: 10 MMOL/L (ref 5–15)
ANION GAP SERPL CALCULATED.3IONS-SCNC: 11 MMOL/L (ref 5–15)
ANION GAP SERPL CALCULATED.3IONS-SCNC: 12 MMOL/L (ref 5–15)
ANION GAP SERPL CALCULATED.3IONS-SCNC: 14 MMOL/L (ref 5–15)
BUN BLD-MCNC: 4 MG/DL (ref 6–20)
BUN BLD-MCNC: 4 MG/DL (ref 6–20)
BUN BLD-MCNC: 5 MG/DL (ref 6–20)
BUN BLD-MCNC: 6 MG/DL (ref 6–20)
BUN/CREAT SERPL: 10.5 (ref 7–25)
BUN/CREAT SERPL: 11.8 (ref 7–25)
BUN/CREAT SERPL: 13.6 (ref 7–25)
BUN/CREAT SERPL: 9.6 (ref 7–25)
CALCIUM SPEC-SCNC: 8.2 MG/DL (ref 8.6–10.5)
CALCIUM SPEC-SCNC: 8.3 MG/DL (ref 8.6–10.5)
CALCIUM SPEC-SCNC: 8.5 MG/DL (ref 8.6–10.5)
CALCIUM SPEC-SCNC: 8.9 MG/DL (ref 8.6–10.5)
CHLORIDE SERPL-SCNC: 101 MMOL/L (ref 98–107)
CHLORIDE SERPL-SCNC: 101 MMOL/L (ref 98–107)
CHLORIDE SERPL-SCNC: 102 MMOL/L (ref 98–107)
CHLORIDE SERPL-SCNC: 102 MMOL/L (ref 98–107)
CO2 SERPL-SCNC: 20 MMOL/L (ref 22–29)
CO2 SERPL-SCNC: 21 MMOL/L (ref 22–29)
CO2 SERPL-SCNC: 22 MMOL/L (ref 22–29)
CO2 SERPL-SCNC: 22 MMOL/L (ref 22–29)
CREAT BLD-MCNC: 0.34 MG/DL (ref 0.57–1)
CREAT BLD-MCNC: 0.38 MG/DL (ref 0.57–1)
CREAT BLD-MCNC: 0.44 MG/DL (ref 0.57–1)
CREAT BLD-MCNC: 0.52 MG/DL (ref 0.57–1)
GAD65 AB SER-ACNC: <5 U/ML (ref 0–5)
GFR SERPL CREATININE-BSD FRML MDRD: 122 ML/MIN/1.73
GFR SERPL CREATININE-BSD FRML MDRD: 148 ML/MIN/1.73
GFR SERPL CREATININE-BSD FRML MDRD: >150 ML/MIN/1.73
GFR SERPL CREATININE-BSD FRML MDRD: >150 ML/MIN/1.73
GLUCOSE BLD-MCNC: 117 MG/DL (ref 65–99)
GLUCOSE BLD-MCNC: 71 MG/DL (ref 65–99)
GLUCOSE BLD-MCNC: 84 MG/DL (ref 65–99)
GLUCOSE BLD-MCNC: 95 MG/DL (ref 65–99)
GLUCOSE BLDC GLUCOMTR-MCNC: 101 MG/DL (ref 70–105)
GLUCOSE BLDC GLUCOMTR-MCNC: 103 MG/DL (ref 70–105)
GLUCOSE BLDC GLUCOMTR-MCNC: 54 MG/DL (ref 70–105)
GLUCOSE BLDC GLUCOMTR-MCNC: 72 MG/DL (ref 70–105)
GLUCOSE BLDC GLUCOMTR-MCNC: 73 MG/DL (ref 70–105)
GLUCOSE BLDC GLUCOMTR-MCNC: 74 MG/DL (ref 70–105)
POTASSIUM BLD-SCNC: 3.9 MMOL/L (ref 3.5–5.2)
POTASSIUM BLD-SCNC: 4 MMOL/L (ref 3.5–5.2)
POTASSIUM BLD-SCNC: 4.1 MMOL/L (ref 3.5–5.2)
POTASSIUM BLD-SCNC: 4.1 MMOL/L (ref 3.5–5.2)
SODIUM BLD-SCNC: 133 MMOL/L (ref 136–145)
SODIUM BLD-SCNC: 134 MMOL/L (ref 136–145)
SODIUM BLD-SCNC: 134 MMOL/L (ref 136–145)
SODIUM BLD-SCNC: 137 MMOL/L (ref 136–145)

## 2020-03-05 PROCEDURE — 94799 UNLISTED PULMONARY SVC/PX: CPT

## 2020-03-05 PROCEDURE — 63710000001 INSULIN LISPRO (HUMAN) PER 5 UNITS: Performed by: INTERNAL MEDICINE

## 2020-03-05 PROCEDURE — 97110 THERAPEUTIC EXERCISES: CPT

## 2020-03-05 PROCEDURE — 25010000002 LEVOFLOXACIN PER 250 MG: Performed by: HOSPITALIST

## 2020-03-05 PROCEDURE — 80048 BASIC METABOLIC PNL TOTAL CA: CPT | Performed by: HOSPITALIST

## 2020-03-05 PROCEDURE — 25010000002 ENOXAPARIN PER 10 MG: Performed by: HOSPITALIST

## 2020-03-05 PROCEDURE — 99232 SBSQ HOSP IP/OBS MODERATE 35: CPT | Performed by: INTERNAL MEDICINE

## 2020-03-05 PROCEDURE — 63710000001 INSULIN GLARGINE PER 5 UNITS: Performed by: INTERNAL MEDICINE

## 2020-03-05 PROCEDURE — 82962 GLUCOSE BLOOD TEST: CPT

## 2020-03-05 PROCEDURE — 97116 GAIT TRAINING THERAPY: CPT

## 2020-03-05 RX ORDER — INSULIN GLARGINE 100 [IU]/ML
8 INJECTION, SOLUTION SUBCUTANEOUS EVERY MORNING
Status: DISCONTINUED | OUTPATIENT
Start: 2020-03-06 | End: 2020-03-06

## 2020-03-05 RX ORDER — GABAPENTIN 100 MG/1
100 CAPSULE ORAL EVERY 8 HOURS SCHEDULED
Status: DISCONTINUED | OUTPATIENT
Start: 2020-03-05 | End: 2020-03-07 | Stop reason: HOSPADM

## 2020-03-05 RX ORDER — CLINDAMYCIN HYDROCHLORIDE 300 MG/1
300 CAPSULE ORAL EVERY 8 HOURS SCHEDULED
Status: DISCONTINUED | OUTPATIENT
Start: 2020-03-05 | End: 2020-03-07 | Stop reason: HOSPADM

## 2020-03-05 RX ORDER — LEVOFLOXACIN 750 MG/1
750 TABLET ORAL EVERY 24 HOURS
Status: DISCONTINUED | OUTPATIENT
Start: 2020-03-06 | End: 2020-03-06

## 2020-03-05 RX ORDER — BISMUTH SUBSALICYLATE 262 MG/1
524 TABLET, CHEWABLE ORAL
Status: DISCONTINUED | OUTPATIENT
Start: 2020-03-05 | End: 2020-03-06

## 2020-03-05 RX ORDER — BUDESONIDE AND FORMOTEROL FUMARATE DIHYDRATE 160; 4.5 UG/1; UG/1
2 AEROSOL RESPIRATORY (INHALATION)
Status: DISCONTINUED | OUTPATIENT
Start: 2020-03-05 | End: 2020-03-07 | Stop reason: HOSPADM

## 2020-03-05 RX ADMIN — LEVOTHYROXINE SODIUM 25 MCG: 25 TABLET ORAL at 06:09

## 2020-03-05 RX ADMIN — LEVOFLOXACIN 750 MG: 5 INJECTION, SOLUTION INTRAVENOUS at 13:01

## 2020-03-05 RX ADMIN — OXYCODONE HYDROCHLORIDE 5 MG: 5 TABLET ORAL at 17:20

## 2020-03-05 RX ADMIN — FOLIC ACID 1 MG: 1 TABLET ORAL at 08:21

## 2020-03-05 RX ADMIN — NICOTINE 1 PATCH: 21 PATCH TRANSDERMAL at 20:11

## 2020-03-05 RX ADMIN — LOPERAMIDE HYDROCHLORIDE 2 MG: 2 CAPSULE ORAL at 06:17

## 2020-03-05 RX ADMIN — CLINDAMYCIN HYDROCHLORIDE 300 MG: 300 CAPSULE ORAL at 14:43

## 2020-03-05 RX ADMIN — THERA TABS 1 TABLET: TAB at 08:21

## 2020-03-05 RX ADMIN — INSULIN LISPRO 3 UNITS: 100 INJECTION, SOLUTION INTRAVENOUS; SUBCUTANEOUS at 08:22

## 2020-03-05 RX ADMIN — BISMUTH SUBSALICYLATE 524 MG: 262 TABLET, CHEWABLE ORAL at 18:06

## 2020-03-05 RX ADMIN — OXYCODONE HYDROCHLORIDE 5 MG: 5 TABLET ORAL at 21:08

## 2020-03-05 RX ADMIN — BUDESONIDE AND FORMOTEROL FUMARATE DIHYDRATE 2 PUFF: 160; 4.5 AEROSOL RESPIRATORY (INHALATION) at 20:28

## 2020-03-05 RX ADMIN — CLINDAMYCIN HYDROCHLORIDE 300 MG: 300 CAPSULE ORAL at 21:03

## 2020-03-05 RX ADMIN — Medication 10 ML: at 20:14

## 2020-03-05 RX ADMIN — INSULIN GLARGINE 10 UNITS: 100 INJECTION, SOLUTION SUBCUTANEOUS at 06:08

## 2020-03-05 RX ADMIN — METRONIDAZOLE 500 MG: 500 INJECTION, SOLUTION INTRAVENOUS at 04:44

## 2020-03-05 RX ADMIN — OXYCODONE HYDROCHLORIDE 5 MG: 5 TABLET ORAL at 13:01

## 2020-03-05 RX ADMIN — GABAPENTIN 100 MG: 100 CAPSULE ORAL at 21:03

## 2020-03-05 RX ADMIN — OXYCODONE HYDROCHLORIDE 5 MG: 5 TABLET ORAL at 08:21

## 2020-03-05 RX ADMIN — ENOXAPARIN SODIUM 40 MG: 40 INJECTION SUBCUTANEOUS at 17:13

## 2020-03-05 RX ADMIN — INSULIN LISPRO 3 UNITS: 100 INJECTION, SOLUTION INTRAVENOUS; SUBCUTANEOUS at 17:14

## 2020-03-05 RX ADMIN — GABAPENTIN 100 MG: 100 CAPSULE ORAL at 14:43

## 2020-03-05 RX ADMIN — METRONIDAZOLE 500 MG: 500 INJECTION, SOLUTION INTRAVENOUS at 13:01

## 2020-03-05 RX ADMIN — OXYCODONE HYDROCHLORIDE 5 MG: 5 TABLET ORAL at 04:44

## 2020-03-05 RX ADMIN — Medication 10 ML: at 20:12

## 2020-03-05 RX ADMIN — IPRATROPIUM BROMIDE AND ALBUTEROL SULFATE 3 ML: .5; 3 SOLUTION RESPIRATORY (INHALATION) at 19:58

## 2020-03-05 RX ADMIN — Medication 10 ML: at 08:23

## 2020-03-05 RX ADMIN — Medication 100 MG: at 08:21

## 2020-03-05 NOTE — PLAN OF CARE
Problem: Patient Care Overview  Goal: Plan of Care Review  Outcome: Ongoing (interventions implemented as appropriate)  Flowsheets (Taken 3/5/2020 2953)  Progress: no change  Outcome Summary: pt rested throughout the shift, c/o tooth aches beginning of shift

## 2020-03-05 NOTE — PROGRESS NOTES
Tobacco Treatment Specialist Note:    Pt was seen by TTS for smoking cessation. Pt was not interested in program and did not want smoking cessation packet.

## 2020-03-05 NOTE — PROGRESS NOTES
"      [facility] Medicine Services Daily Progress Note      Hospitalist Team  LOS 3 days      Patient Care Team:  Provider, No Known as PCP - General    Patient Location: 226/1      Subjective   Subjective     Chief Complaint / Subjective  Chief Complaint   Patient presents with   • Weakness - Generalized         Brief Synopsis of Hospital Course/HPI      The patient is a 54 y.o. female with past medical history of lupus, alcohol and tobacco abuse who presented to [facility] ED on 3/1/2020 complaining of 9 days of nausea, vomiting, diarrhea and weakness.    The patient has not seen a physician for 3 years and is originally from [place].  She admits to drinking alcohol daily.  The patient has been falling at home because of weakness and denies focal weakness or dizziness before falls.    Date::    3/2/20: Patient received IV fluid boluses for low BP overnight.  Has not seen a physician for 3 years. Originally from [place].  History of lupus.  Not on medications at home.  Afebrile.  Claims to have several days of diarrhea and started on azithromycin for E. coli positive stool.  Endocrinology consulted.  3/3/20: Complained of right facial edema pain and oral pain.  Changed antibiotics to Levaquin and Flagyl.  DC azithromycin.  Blood glucose better controlled.  3/4/20: CT facial bone shows dental abscess and caries.  Afebrile.      Review of Systems   All other systems reviewed and are negative.        Objective   Objective      Vital Signs  Temp:  [97.9 °F (36.6 °C)-98.4 °F (36.9 °C)] 97.9 °F (36.6 °C)  Heart Rate:  [] 103  Resp:  [15-16] 16  BP: (102-118)/(69-87) 105/73  Oxygen Therapy  SpO2: 100 %  Pulse Oximetry Type: Intermittent  Device (Oxygen Therapy): room air  Flow (L/min): 2  Flowsheet Rows      First Filed Value   Admission Height  157.5 cm (62\") Documented at 03/01/2020 1345   Admission Weight  47.8 kg (105 lb 6.1 oz) Documented at 03/01/2020 1345        Intake & Output " (last 3 days)       03/02 0701 - 03/03 0700 03/03 0701 - 03/04 0700 03/04 0701 - 03/05 0700 03/05 0701 - 03/06 0700    P.O. 6517 946 9931 240    I.V. (mL/kg) 100 (1.9)       IV Piggyback  100 100 100    Total Intake(mL/kg) 1200 (22.2) 600 (10.7) 1780 (32.9) 340 (6.3)    Urine (mL/kg/hr) 450 (0.3)  240 (0.2)     Stool 0  0     Total Output 450  240     Net +750 +600 +1540 +340            Urine Unmeasured Occurrence 4 x 1 x 7 x     Stool Unmeasured Occurrence 5 x 3 x 4 x         Lines, Drains & Airways    Active LDAs     Name:   Placement date:   Placement time:   Site:   Days:    Peripheral IV 03/01/20 1429 Left Antecubital   03/01/20    1429    Antecubital   1    Peripheral IV 03/02/20 1200 Left;Posterior Hand   03/02/20    1200    Hand   less than 1                  Physical Exam:    Physical Exam   Constitutional: She is oriented to person, place, and time. She appears well-developed.   HENT:   Head: Normocephalic.   Mouth/Throat: Mucous membranes are dry.   Eyes: Pupils are equal, round, and reactive to light.   Neck: Trachea normal and full passive range of motion without pain.   Cardiovascular: Normal rate and regular rhythm.   Pulmonary/Chest: Effort normal and breath sounds normal.   Musculoskeletal:   Moves all extremities.   Lymphadenopathy:     She has no cervical adenopathy.   Neurological: She is alert and oriented to person, place, and time. No cranial nerve deficit.   Skin: Skin is warm and dry.   Psychiatric: She has a normal mood and affect. Her speech is normal and behavior is normal. Cognition and memory are normal.     Procedures: None      Results Review:     I reviewed the patient's new clinical results.      Lab Results (last 24 hours)     Procedure Component Value Units Date/Time    Glutamic Acid Decarboxylase [[id number]] Collected:  03/03/20 0249    Specimen:  Blood Updated:  03/05/20 1309     BRAD-65 <5.0 U/mL     Narrative:       Performed at:  [address] [place] [address],  [place], [place]  [id number]  : Mikayla Rodriguez MD, Phone:  [phone]    Basic Metabolic Panel [[id number]]  (Abnormal) Collected:  03/05/20 1200    Specimen:  Blood Updated:  03/05/20 1254     Glucose 95 mg/dL      BUN 5 mg/dL      Creatinine 0.52 mg/dL      Sodium 137 mmol/L      Potassium 4.0 mmol/L      Chloride 101 mmol/L      CO2 22.0 mmol/L      Calcium 8.9 mg/dL      eGFR Non African Amer 122 mL/min/1.73      BUN/Creatinine Ratio 9.6     Anion Gap 14.0 mmol/L     Narrative:       GFR Normal >60  Chronic Kidney Disease <60  Kidney Failure <15      POC Glucose Once [[id number]]  (Normal) Collected:  03/05/20 1227    Specimen:  Blood Updated:  03/05/20 1238     Glucose 101 mg/dL      Comment: Serial Number: [id number]:  [id number]       POC Glucose Once [[id number]]  (Abnormal) Collected:  03/05/20 1115    Specimen:  Blood Updated:  03/05/20 1122     Glucose 54 mg/dL      Comment: Serial Number: [id number]:  [id number]       POC Glucose Once [[id number]]  (Normal) Collected:  03/05/20 0717    Specimen:  Blood Updated:  03/05/20 0718     Glucose 73 mg/dL      Comment: Serial Number: [id number]:  [id number]       Basic Metabolic Panel [[id number]]  (Abnormal) Collected:  03/05/20 0402    Specimen:  Blood Updated:  03/05/20 0506     Glucose 84 mg/dL      BUN 4 mg/dL      Creatinine 0.34 mg/dL      Sodium 134 mmol/L      Potassium 3.9 mmol/L      Chloride 102 mmol/L      CO2 20.0 mmol/L      Calcium 8.3 mg/dL      eGFR Non African Amer >150 mL/min/1.73      BUN/Creatinine Ratio 11.8     Anion Gap 12.0 mmol/L     Narrative:       GFR Normal >60  Chronic Kidney Disease <60  Kidney Failure <15      POC Glucose Once [[id number]]  (Normal) Collected:  03/05/20 0441    Specimen:  Blood Updated:  03/05/20 0442     Glucose 72 mg/dL      Comment: Serial Number: [id number]:  [id number]       Basic Metabolic Panel [[id number]]  (Abnormal) Collected:  03/05/20 0018    Specimen:  Blood Updated:   03/05/20 0104     Glucose 117 mg/dL      BUN 4 mg/dL      Creatinine 0.38 mg/dL      Sodium 133 mmol/L      Potassium 4.1 mmol/L      Chloride 101 mmol/L      CO2 22.0 mmol/L      Calcium 8.2 mg/dL      eGFR Non African Amer >150 mL/min/1.73      BUN/Creatinine Ratio 10.5     Anion Gap 10.0 mmol/L     Narrative:       GFR Normal >60  Chronic Kidney Disease <60  Kidney Failure <15      POC Glucose Once [[id number]]  (Normal) Collected:  03/04/20 2315    Specimen:  Blood Updated:  03/04/20 2316     Glucose 73 mg/dL      Comment: Serial Number: [id number]:  [id number]       POC Glucose Once [[id number]]  (Abnormal) Collected:  03/04/20 2259    Specimen:  Blood Updated:  03/04/20 2300     Glucose 67 mg/dL      Comment: Serial Number: [id number]:  [id number]       POC Glucose Once [[id number]]  (Normal) Collected:  03/04/20 1941    Specimen:  Blood Updated:  03/04/20 1942     Glucose 78 mg/dL      Comment: Serial Number: [id number]:  [id number]       Basic Metabolic Panel [[id number]]  (Abnormal) Collected:  03/04/20 1846    Specimen:  Blood Updated:  03/04/20 1919     Glucose 87 mg/dL      BUN 5 mg/dL      Creatinine 0.39 mg/dL      Sodium 133 mmol/L      Potassium 4.2 mmol/L      Chloride 101 mmol/L      CO2 23.0 mmol/L      Calcium 8.4 mg/dL      eGFR Non African Amer >150 mL/min/1.73      BUN/Creatinine Ratio 12.8     Anion Gap 9.0 mmol/L     Narrative:       GFR Normal >60  Chronic Kidney Disease <60  Kidney Failure <15      POC Glucose Once [[id number]]  (Normal) Collected:  03/04/20 1636    Specimen:  Blood Updated:  03/04/20 1654     Glucose 82 mg/dL      Comment: Serial Number: [id number]:  [id number]       C-Peptide [[id number]]  (Abnormal) Collected:  03/03/20 0249    Specimen:  Blood Updated:  03/04/20 1509     C-Peptide 0.5 ng/mL      Comment: C-Peptide reference interval is for fasting patients.       Narrative:       Performed at:  [address] [place], [place] [address], [place]   [id number]  : Maged Johnston PhD, Phone:  [phone]    Basic Metabolic Panel [[id number]]  (Abnormal) Collected:  03/04/20 1315    Specimen:  Blood Updated:  03/04/20 1432     Glucose 103 mg/dL      BUN 4 mg/dL      Creatinine 0.34 mg/dL      Sodium 131 mmol/L      Potassium 4.0 mmol/L      Chloride 98 mmol/L      CO2 22.0 mmol/L      Calcium 8.4 mg/dL      eGFR Non African Amer >150 mL/min/1.73      BUN/Creatinine Ratio 11.8     Anion Gap 11.0 mmol/L     Narrative:       GFR Normal >60  Chronic Kidney Disease <60  Kidney Failure <15      Phosphorus [[id number]]  (Normal) Collected:  03/04/20 1315    Specimen:  Blood Updated:  03/04/20 1432     Phosphorus 3.5 mg/dL     Magnesium [[id number]]  (Normal) Collected:  03/04/20 1315    Specimen:  Blood Updated:  03/04/20 1432     Magnesium 2.1 mg/dL         No results found for: HGBA1C  Results from last 7 days   Lab Units 03/01/20  1432   INR  0.98           Lab Results   Component Value Date    LIPASE 39 03/01/2020     Lab Results   Component Value Date    CHOL 98 03/02/2020    TRIG 163 (H) 03/02/2020    HDL 38 (L) 03/02/2020    LDL 27 03/02/2020       No results found for: INTRAOP, PREDX, FINALDX, COMDX    Microbiology Results (last 10 days)     Procedure Component Value - Date/Time    Gastrointestinal Panel, PCR - Stool, Per Rectum [[id number]]  (Abnormal) Collected:  03/02/20 0440    Lab Status:  Final result Specimen:  Stool from Per Rectum Updated:  03/02/20 0841     Campylobacter Not Detected     Plesiomonas shigelloides Not Detected     Salmonella Not Detected     Vibrio Not Detected     Vibrio cholerae Not Detected     Yersinia enterocolitica Not Detected     Enteroaggregative E. coli (EAEC) Detected     Enteropathogenic E. coli (EPEC) Not Detected     Enterotoxigenic E. coli (ETEC) lt/st Not Detected     Shiga-like toxin-producing E. coli (STEC) stx1/stx2 Not Detected     E. coli O157 Not Detected     Shigella/Enteroinvasive E. coli (EIEC) Not  Detected     Cryptosporidium Not Detected     Cyclospora cayetanensis Not Detected     Entamoeba histolytica Not Detected     Giardia lamblia Not Detected     Adenovirus F40/41 Not Detected     Astrovirus Not Detected     Norovirus GI/GII Not Detected     Rotavirus A Not Detected     Sapovirus (I, II, IV or V) Not Detected    Narrative:       If Aeromonas, Staphylococcus aureus or Bacillus cereus are suspected, please order [id number]: Stool Culture, Aeromonas, S aureus, B Cereus.    Respiratory Panel, PCR - Swab, Nasopharynx [[id number]]  (Normal) Collected:  03/02/20 0434    Lab Status:  Final result Specimen:  Swab from Nasopharynx Updated:  03/02/20 0629     ADENOVIRUS, PCR Not Detected     Coronavirus 229E Not Detected     Coronavirus HKU1 Not Detected     Coronavirus NL63 Not Detected     Coronavirus OC43 Not Detected     Human Metapneumovirus Not Detected     Human Rhinovirus/Enterovirus Not Detected     Influenza B PCR Not Detected     Parainfluenza Virus 1 Not Detected     Parainfluenza Virus 2 Not Detected     Parainfluenza Virus 3 Not Detected     Parainfluenza Virus 4 Not Detected     Bordetella pertussis pcr Not Detected     Influenza A H1 2009 PCR Not Detected     Chlamydophila pneumoniae PCR Not Detected     Mycoplasma pneumo by PCR Not Detected     Influenza A PCR Not Detected     Influenza A H3 Not Detected     Influenza A H1 Not Detected     RSV, PCR Not Detected    Urine Culture - Urine, Urine, Clean Catch [[id number]] Collected:  03/01/20 1530    Lab Status:  Final result Specimen:  Urine, Clean Catch Updated:  03/02/20 1634     Urine Culture >100,000 CFU/mL Mixed Emre Isolated    Narrative:       Specimen contains mixed organisms of questionable pathogenicity which indicates contamination with commensal emre.  Further identification is unlikely to provide clinically useful information.  Suggest recollection.          ECG/EMG Results (most recent)     Procedure Component Value Units Date/Time    ECG  12 Lead [[id number]]  (Abnormal) Resulted:  03/01/20 1624     Updated:  03/01/20 1624    ECG 12 Lead [[id number]] Collected:  03/02/20 0301     Updated:  03/02/20 0302    Narrative:       HEART RATE= 94  bpm  RR Interval= 636  ms  SD Interval= 130  ms  P Horizontal Axis= -10  deg  P Front Axis= 77  deg  QRSD Interval= 85  ms  QT Interval= 431  ms  QRS Axis= 82  deg  T Wave Axis= 265  deg  - ABNORMAL ECG -  Sinus rhythm  Repol abnrm suggests ischemia, diffuse leads  Prolonged QT interval  Electronically Signed By:   Date and Time of Study: 2020-03-02 03:01:10    ECG 12 Lead [[id number]] Collected:  03/01/20 1405     Updated:  03/02/20 0726    Narrative:       HEART RATE= 93  bpm  RR Interval= 644  ms  SD Interval= 136  ms  P Horizontal Axis= -4  deg  P Front Axis= 68  deg  QRSD Interval= 84  ms  QT Interval= 393  ms  QRS Axis= 75  deg  T Wave Axis= 262  deg  - ABNORMAL ECG -  Sinus rhythm  LAE, consider biatrial enlargement  Repol abnrm suggests ischemia, diffuse leads  Electronically Signed By: Berlin Smith (David) 02-Mar-2020 07:26:06  Date and Time of Study: 2020-03-01 14:05:28                     Gallbladder    Result Date: 3/2/2020  1. Cholecystectomy. Normal caliber of the biliary tree. 2. Severe hepatic steatosis. 3. The right kidney shows no evidence of shadowing stone or hydronephrosis. Electronically signed by:  Mike Valdes M.D.  3/2/2020 1:11 AM    Xr Chest 1 View    Result Date: 3/2/2020  Impression: 1. Prominence of bronchopulmonary markings which can be seen in viral airway disease.  Please correlate for bronchitis. 2. No focal infiltrate. Electronically signed by:  Marsha Omalley M.D.  3/2/2020 1:47 AM    Ct Facial Bones With Contrast    Result Date: 3/4/2020   Multiple dental caries and periapical lucencies consistent with dental abscesses. Dental x-rays would be more useful at outpatient follow-up.  Electronically Signed By-Josef Matson On:3/4/2020 2:43 PM This report was finalized on  [id number] by  Josef Matson .          Xrays, labs reviewed personally by physician.    Medication Review:   I have reviewed the patient's current medication list      Scheduled Meds    enoxaparin 40 mg Subcutaneous Q24H   folic acid 1 mg Oral Daily   insulin glargine 10 Units Subcutaneous QAM   insulin lispro 0-7 Units Subcutaneous TID With Meals   insulin lispro 3 Units Subcutaneous TID With Meals   levoFLOXacin 750 mg Intravenous Q24H   levothyroxine 25 mcg Oral Q AM   metroNIDAZOLE 500 mg Intravenous Q8H   nicotine 1 patch Transdermal Nightly   sodium chloride 1,000 mL Intravenous Once   sodium chloride 10 mL Intravenous Q12H   sodium chloride 10 mL Intravenous Q12H   THERA 1 tablet Oral Daily   thiamine 100 mg Oral Daily       Meds Infusions       Meds PRN  •  acetaminophen  •  calcium carbonate  •  Calcium Gluconate-NaCl **AND** calcium gluconate **AND** Calcium, Ionized  •  dextrose  •  dextrose  •  docusate sodium  •  glucagon (human recombinant)  •  ipratropium-albuterol  •  loperamide  •  LORazepam **OR** LORazepam **OR** LORazepam **OR** LORazepam **OR** LORazepam **OR** LORazepam  •  magnesium sulfate **OR** magnesium sulfate **OR** magnesium sulfate  •  melatonin  •  nitroglycerin  •  ondansetron **OR** ondansetron  •  oxyCODONE  •  potassium & sodium phosphates **OR** potassium & sodium phosphates  •  potassium chloride  •  potassium chloride  •  potassium chloride  •  [COMPLETED] Insert peripheral IV **AND** sodium chloride  •  sodium chloride  •  sodium chloride      Assessment/Plan   Assessment/Plan     Active Hospital Problems    Diagnosis  POA   • **Hyperglycemia [R73.9]  Yes   • Hypophosphatemia [E83.39]  Yes   • Starvation ketoacidosis [E87.2]  Yes   • Type 2 diabetes mellitus (CMS/HCC) [E11.9]  Yes   • Hypokalemia, inadequate intake [E87.6]  Yes   • Tobacco abuse [Z72.0]  Unknown   • Alcoholism /alcohol abuse (CMS/HCC) [F10.20]  Yes   • Hyponatremia [E87.1]  Yes   • Transaminitis [R74.0]   Yes   • Insomnia [G47.00]  Yes      Resolved Hospital Problems   No resolved problems to display.       MEDICAL DECISION MAKING COMPLEXITY BY PROBLEM:     1.  New onset diabetes mellitus with hypoglycemia  -Hemoglobin A1c 12.9  -Continue Lantus, mealtime insulin and ISS   -Endocrinology and diabetic nurse educator consulted     2.  Infectious diarrhea:  ; E.coli( EAEC)  ; Levaquin and Flagyl  ; still having diarrhea - peptobismol prn          3.  Right facial cellulitis:  -With dental caries/abscess  -Onset after admission  -PCN allergy therefore Levaquin and Flagyl for now  -s/p CT facial bone     4.  New hypothyroidism:  -Started on Synthroid by endocrinology    5. Hypokalemia and hypophosphatemia:  -Replacement protocol ordered  -Consult nutrition for calorie count  - resolved     6.  Alcohol abuse  -CIWA protocol ordered  -Case management consult placed  - applied neurontin     7.  Tobacco abuse  -Nicotine patch ordered    8.  Lupus:  -Has not been on Plaquenil for 3 years    9. COPD without exacerbation  ; duoneb, apply steroid inhaler     10. Acute deconditioning  - still very frail , walking with PT but very SOB on exertion due to COPD      VTE Prophylaxis -   Mechanical Order History:     None      Pharmalogical Order History:     Ordered     Dose Route Frequency Stop    03/02/20 0821  enoxaparin (LOVENOX) syringe 40 mg      40 mg SC Every 24 Hours --    03/01/20 2030  enoxaparin (LOVENOX) syringe 30 mg  Status:  Discontinued      30 mg SC Daily 03/02/20 0835            Code Status -   Code Status and Medical Interventions:   Ordered at: 03/01/20 1657     Level Of Support Discussed With:    Patient     Code Status:    CPR     Medical Interventions (Level of Support Prior to Arrest):    Full       Discharge Planning DC to rehab when bed available.          Destination      Coordination has not been started for this encounter.      Durable Medical Equipment      Coordination has not been started for this  encounter.      Dialysis/Infusion      Coordination has not been started for this encounter.      Home Medical Care      Coordination has not been started for this encounter.      Therapy      Coordination has not been started for this encounter.      Community Resources      Coordination has not been started for this encounter.            Electronically signed by Esme Stanton MD, 03/05/20, 1:29 PM.  [facility] Hospitalist Team

## 2020-03-05 NOTE — PROGRESS NOTES
Daily Progress Note    Patient Care Team:  Provider, No Known as PCP - General    Chief Complaint: Follow-up type 1 diabetes    HPI: Patient seen and examined today.  Looking much better.  Eating well.  Blood sugars are running on the low normal side.  No complaints at this time.  C-peptide low consistent with type 1 diabetes.    ROS:   Constitutional:  Denies fatigue, tiredness.    Eyes:  Denies change in visual acuity   HENT:  Denies nasal congestion or sore throat   Respiratory: denies cough, shortness of breath.   Cardiovascular:  denies chest pain, edema   GI:  Denies abdominal pain, nausea, vomiting.   :  Denies polyuria and polydipsia  Musculoskeletal:  Denies back pain or joint pain   Integument:  Denies rash   Neurologic:  Denies headache, focal weakness or sensory changes   Endocrine:  Denies polyuria or polydipsia   Psychiatric:  Denies depression or anxiety       Vitals:    03/04/20 1942   BP: 105/72   Pulse: 94   Resp: 16   Temp: 98.2 °F (36.8 °C)   SpO2: 95%       Physical Exam:  GEN: NAD, conversant  EYES: EOMI, PERRL, no conjunctival erythema  NECK: no thyromegaly, full ROM   CV: RRR, no murmurs/rubs/gallops, no peripheral edema  LUNG: CTAB, no wheezes/rales/ronchi  SKIN: no rashes, no acanthosis  MSK: no deformities, full ROM of all extremities  NEURO: no tremors, DTR normal  PSYCH: AOX3, appropriate mood, affect normal      Results Review:     I reviewed the patient's new clinical results.    Glucose   Date Value Ref Range Status   03/04/2020 87 65 - 99 mg/dL Final     Sodium   Date Value Ref Range Status   03/04/2020 133 (L) 136 - 145 mmol/L Final     Potassium   Date Value Ref Range Status   03/04/2020 4.2 3.5 - 5.2 mmol/L Final     CO2   Date Value Ref Range Status   03/04/2020 23.0 22.0 - 29.0 mmol/L Final     Chloride   Date Value Ref Range Status   03/04/2020 101 98 - 107 mmol/L Final     Anion Gap   Date Value Ref Range Status   03/04/2020 9.0 5.0 - 15.0 mmol/L Final     Creatinine    Date Value Ref Range Status   03/04/2020 0.39 (L) 0.57 - 1.00 mg/dL Final     BUN   Date Value Ref Range Status   03/04/2020 5 (L) 6 - 20 mg/dL Final     BUN/Creatinine Ratio   Date Value Ref Range Status   03/04/2020 12.8 7.0 - 25.0 Final     Calcium   Date Value Ref Range Status   03/04/2020 8.4 (L) 8.6 - 10.5 mg/dL Final     eGFR Non  Amer   Date Value Ref Range Status   03/04/2020 >150 >60 mL/min/1.73 Final     Alkaline Phosphatase   Date Value Ref Range Status   03/03/2020 137 (H) 39 - 117 U/L Final     Total Protein   Date Value Ref Range Status   03/03/2020 4.9 (L) 6.0 - 8.5 g/dL Final     ALT (SGPT)   Date Value Ref Range Status   03/03/2020 62 (H) 1 - 33 U/L Final     AST (SGOT)   Date Value Ref Range Status   03/03/2020 81 (H) 1 - 32 U/L Final     Total Bilirubin   Date Value Ref Range Status   03/03/2020 0.9 0.2 - 1.2 mg/dL Final     Albumin   Date Value Ref Range Status   03/03/2020 2.90 (L) 3.50 - 5.20 g/dL Final     Globulin   Date Value Ref Range Status   03/03/2020 2.0 gm/dL Final     Magnesium   Date Value Ref Range Status   03/04/2020 2.1 1.6 - 2.6 mg/dL Final     Phosphorus   Date Value Ref Range Status   03/04/2020 3.5 2.5 - 4.5 mg/dL Final     Lab Results   Component Value Date    HGBA1C 12.9 (H) 03/02/2020     No results found for: GLUF, MICROALBUR  Results from last 7 days   Lab Units 03/04/20  1941 03/04/20  1636 03/04/20  1125 03/04/20  0732 03/03/20  2041 03/03/20  1633   GLUCOSE mg/dL 78 82 70 126* 139* 102     Results for SUNITA MANCINI (MRN [id number]) as of 3/4/2020 19:45   Ref. Range 3/2/2020 02:58 3/3/2020 02:49   Hemoglobin A1C Latest Ref Range: 3.5 - 5.6 % 12.9 (H)    C-Peptide Latest Ref Range: 1.1 - 4.4 ng/mL  0.5 (L)   TSH Baseline Latest Ref Range: 0.270 - 4.200 uIU/mL 6.580 (H) 6.680 (H)   Free T4 Latest Ref Range: 0.93 - 1.70 ng/dL  0.73 (L)   Thyroid Peroxidase Antibody Latest Ref Range: 0 - 34 IU/mL  17             Medication Review: Reviewed.       enoxaparin  40 mg Subcutaneous Q24H   folic acid 1 mg Oral Daily   insulin glargine 14 Units Subcutaneous QAM   insulin lispro 0-7 Units Subcutaneous TID With Meals   insulin lispro 4 Units Subcutaneous TID With Meals   levoFLOXacin 750 mg Intravenous Q24H   levothyroxine 25 mcg Oral Q AM   metroNIDAZOLE 500 mg Intravenous Q8H   nicotine 1 patch Transdermal Nightly   sodium chloride 1,000 mL Intravenous Once   sodium chloride 10 mL Intravenous Q12H   sodium chloride 10 mL Intravenous Q12H   THERA 1 tablet Oral Daily   thiamine 100 mg Oral Daily         Assessment/Plan   1.  Diabetes mellitus type 1: Uncontrolled but blood sugars are improving  2.  Hypothyroidism: On levothyroxine supplementation  3.  Severe hypokalemia: Replaced  4.  Severe hypophosphatemia: Replaced  5.  Malnutrition  6.  Lupus.    Plan:  I will decrease Lantus to 10 units subcu nightly and decrease Humalog to 3 units with each meal and continue to follow blood sugars and make further adjustments as needed.  We will continue levothyroxine.               José Miguel Regalado MD. FACE

## 2020-03-05 NOTE — THERAPY TREATMENT NOTE
Patient Name: Smita Steele  : 1965    MRN: [id number]                              Today's Date: 3/5/2020       Admit Date: 3/1/2020    Visit Dx:     ICD-10-CM ICD-9-CM   1. Hypokalemia, inadequate intake E87.6 276.8   2. Non-intractable vomiting with nausea, unspecified vomiting type R11.2 787.01   3. Diarrhea, unspecified type R19.7 787.91   4. Dehydration E86.0 276.51   5. Hyperglycemia R73.9 790.29   6. Oral thrush B37.0 112.0     Patient Active Problem List   Diagnosis   • Hypokalemia, inadequate intake   • Hyperglycemia   • Tobacco abuse   • Alcoholism /alcohol abuse (CMS/HCC)   • Hyponatremia   • Transaminitis   • Insomnia   • Hypophosphatemia   • Starvation ketoacidosis   • Type 2 diabetes mellitus (CMS/HCC)     Past Medical History:   Diagnosis Date   • Lupus (CMS/HCC)      Past Surgical History:   Procedure Laterality Date   •  SECTION     • CHOLECYSTECTOMY     • HYSTERECTOMY       General Information     Row Name 20 0852          PT Evaluation Time/Intention    Document Type  therapy note (daily note)  (Pended)   -     Mode of Treatment  physical therapy  (Pended)   -     Row Name 20 0852          General Information    Existing Precautions/Restrictions  fall  (Pended)   -     Row Name 20 0852          Cognitive Assessment/Intervention- PT/OT    Orientation Status (Cognition)  oriented x 4  (Pended)   -     Row Name 20 0852          Safety Issues, Functional Mobility    Impairments Affecting Function (Mobility)  balance;endurance/activity tolerance;postural/trunk control;shortness of breath  (Pended)   -       User Key  (r) = Recorded By, (t) = Taken By, (c) = Cosigned By    Initials Name Provider Type     Luisa Richey PTA Physical Therapy Assistant        Mobility     Row Name 20 0853          Bed Mobility Assessment/Treatment    Sit-Supine [place] (Bed Mobility)  supervision  (Pended)   -     Row Name 20 0853           Sit-Stand Transfer    Sit-Stand [place] (Transfers)  conditional independence  (Pended)   -BM     Assistive Device (Sit-Stand Transfers)  walker, front-wheeled  (Pended)   -BM     Row Name 03/05/20 0853          Gait/Stairs Assessment/Training    Gait/Stairs Assessment/Training  gait/ambulation independence;gait/ambulation assistive device  (Pended)   -BM     [place] Level (Gait)  contact guard  (Pended)   -BM     Assistive Device (Gait)  walker, front-wheeled  (Pended)   -BM     Deviations/Abnormal Patterns (Gait)  base of support, narrow;festinating/shuffling;padmini decreased  (Pended)   -BM       User Key  (r) = Recorded By, (t) = Taken By, (c) = Cosigned By    Initials Name Provider Type    BM Luisa Richey PTA Physical Therapy Assistant        Obj/Interventions     Row Name 03/05/20 0854          Therapeutic Exercise    Lower Extremity (Therapeutic Exercise)  LAQ (long arc quad), bilateral;marching while seated;other (see comments)  (Pended)   -BM     Position (Therapeutic Exercise)  seated  (Pended)   -BM     Comment (Therapeutic Exercise)  Pt complete 1x15 LAQ, marches, and heel raises.  (Pended)   -BM     Row Name 03/05/20 0854          Static Sitting Balance    Level of [place] (Unsupported Sitting, Static Balance)  supervision  (Pended)   -BM     Sitting Position (Unsupported Sitting, Static Balance)  sitting on edge of bed  (Pended)   -BM     Time Able to Maintain Position (Unsupported Sitting, Static Balance)  2 to 3 minutes  (Pended)   -BM     Row Name 03/05/20 0854          Dynamic Sitting Balance    Level of [place], Reaches Outside Midline (Sitting, Dynamic Balance)  supervision  (Pended)   -BM     Sitting Position, Reaches Outside Midline (Sitting, Dynamic Balance)  sitting on edge of bed  (Pended)   -BM     Row Name 03/05/20 0854          Static Standing Balance    Level of [place] (Supported Standing, Static Balance)  contact guard assist  (Pended)   -BM     Time  "Able to Maintain Position (Supported Standing, Static Balance)  15 to 30 seconds  (Pended)   -BM     Assistive Device Utilized (Supported Standing, Static Balance)  walker, rolling  (Pended)   -     Row Name 03/05/20 0854          Dynamic Standing Balance    Level of [place], Reaches Outside Midline (Standing, Dynamic Balance)  contact guard assist  (Pended)   -BM     Time Able to Maintain Position, Reaches Outside Midline (Standing, Dynamic Balance)  1 to 2 minutes  (Pended)   -BM     Assistive Device Utilized (Supported Standing, Dynamic Balance)  walker, rolling  (Pended)   -       User Key  (r) = Recorded By, (t) = Taken By, (c) = Cosigned By    Initials Name Provider Type     Luisa Richey PTA Physical Therapy Assistant        Goals/Plan    No documentation.       Clinical Impression     Row Name 03/05/20 0859          Pain Scale: Numbers Pre/Post-Treatment    Pain Scale: Numbers, Pretreatment  0/10 - no pain  (Pended)   -     Pain Scale: Numbers, Post-Treatment  0/10 - no pain  (Pended)   -     Pre/Post Treatment Pain Comment  No pain reported but pt reports her legs feel \"weak and tired\"  (Pended)   -     Row Name 03/05/20 0859          Vital Signs    Intra SpO2 (%)  100  (Pended)   -     O2 Delivery Intra Treatment  room air  (Pended)   -     Row Name 03/05/20 0859          Positioning and Restraints    Pre-Treatment Position  in bed  (Pended)   -BM     Post Treatment Position  chair  (Pended)   -BM     In Bed  notified nsg;call light within reach;encouraged to call for assist  (Pended)   -       User Key  (r) = Recorded By, (t) = Taken By, (c) = Cosigned By    Initials Name Provider Type     Luisa Richey PTA Physical Therapy Assistant        Outcome Measures     Row Name 03/05/20 0902          How much help from another person do you currently need...    Turning from your back to your side while in flat bed without using bedrails?  3  (Pended)   -     Moving from lying " on back to sitting on the side of a flat bed without bedrails?  3  (Pended)   -BM     Moving to and from a bed to a chair (including a wheelchair)?  3  (Pended)   -BM     Standing up from a chair using your arms (e.g., wheelchair, bedside chair)?  3  (Pended)   -BM     Climbing 3-5 steps with a railing?  2  (Pended)   -BM     To walk in hospital room?  3  (Pended)   -BM     AM-PAC 6 Clicks Score (PT)  17  (Pended)   -BM       User Key  (r) = Recorded By, (t) = Taken By, (c) = Cosigned By    Initials Name Provider Type    Luisa Talley PTA Physical Therapy Assistant          PT Recommendation and Plan     Progress: (P) improving  Outcome Summary: (P) Pt completed 1x15 of LAQ, heel raises and marches all the the EOB. Pt walked 40' w/ rwx pt was SOA during gait but SATs were at 100% on room air when she finished walking. With amb pt demostrates weakness of the LE with decrease toe off and heels strike with when walking. She is not safe enough to returrn home, IP rehab recommended.     Time Calculation:   PT Charges     Row Name 03/05/20 0854             Timed Charges    91952 - Gait Training Minutes   8  (Pended)  pt amb 40 ft w/ RWX  -BM        User Key  (r) = Recorded By, (t) = Taken By, (c) = Cosigned By    Initials Name Provider Type    Luisa Talley PTA Physical Therapy Assistant            PT G-Codes  Outcome Measure Options: AM-PAC 6 Clicks Basic Mobility (PT)  AM-PAC 6 Clicks Score (PT): (P) 17    Luisa Richey PTA  3/5/2020

## 2020-03-05 NOTE — PLAN OF CARE
Problem: Patient Care Overview  Goal: Plan of Care Review  Outcome: Ongoing (interventions implemented as appropriate)  Flowsheets (Taken 3/5/2020 0904)  Progress: improving  Outcome Summary: Pt completed 1x15 of LAQ, heel raises and marches all the the EOB. Pt walked 40' w/ rwx pt was SOA during gait but SATs were at 100% on room air when she finished walking. With amb pt demostrates weakness of the LE with decrease toe off and heels strike with when walking. She is not safe enough to returrn home d/t weakness, IP rehab recommended.

## 2020-03-05 NOTE — PROGRESS NOTES
Daily Progress Note    Patient Care Team:  Provider, No Known as PCP - General    Chief Complaint: Follow-up type 1 diabetes    HPI: Patient seen and examined today.  Looking much better.  Eating well.  Blood sugars are running on the low normal side.  No complaints at this time.  C-peptide low consistent with type 1 diabetes.     ROS:   Constitutional:  Denies fatigue, tiredness.    Eyes:  Denies change in visual acuity   HENT:  Denies nasal congestion or sore throat   Respiratory: denies cough, shortness of breath.   Cardiovascular:  denies chest pain, edema   GI:  Denies abdominal pain, nausea, vomiting.   :  Denies polyuria and polydipsia  Musculoskeletal:  Denies back pain or joint pain   Integument:  Denies rash   Neurologic:  Denies headache, focal weakness or sensory changes   Endocrine:  Denies polyuria or polydipsia   Psychiatric:  Denies depression or anxiety       Vitals:    03/05/20 1124   BP: 105/73   Pulse: 103   Resp: 16   Temp: 97.9 °F (36.6 °C)   SpO2: 100%       Physical Exam:  GEN: NAD, conversant  EYES: EOMI, PERRL, no conjunctival erythema  NECK: no thyromegaly, full ROM   CV: RRR, no murmurs/rubs/gallops, no peripheral edema  LUNG: CTAB, no wheezes/rales/ronchi  SKIN: no rashes, no acanthosis  MSK: no deformities, full ROM of all extremities  NEURO: no tremors, DTR normal  PSYCH: AOX3, appropriate mood, affect normal      Results Review:     I reviewed the patient's new clinical results.    Glucose   Date Value Ref Range Status   03/05/2020 95 65 - 99 mg/dL Final     Sodium   Date Value Ref Range Status   03/05/2020 137 136 - 145 mmol/L Final     Potassium   Date Value Ref Range Status   03/05/2020 4.0 3.5 - 5.2 mmol/L Final     CO2   Date Value Ref Range Status   03/05/2020 22.0 22.0 - 29.0 mmol/L Final     Chloride   Date Value Ref Range Status   03/05/2020 101 98 - 107 mmol/L Final     Anion Gap   Date Value Ref Range Status   03/05/2020 14.0 5.0 - 15.0 mmol/L Final     Creatinine    Date Value Ref Range Status   03/05/2020 0.52 (L) 0.57 - 1.00 mg/dL Final     BUN   Date Value Ref Range Status   03/05/2020 5 (L) 6 - 20 mg/dL Final     BUN/Creatinine Ratio   Date Value Ref Range Status   03/05/2020 9.6 7.0 - 25.0 Final     Calcium   Date Value Ref Range Status   03/05/2020 8.9 8.6 - 10.5 mg/dL Final     eGFR Non  Amer   Date Value Ref Range Status   03/05/2020 122 >60 mL/min/1.73 Final     Alkaline Phosphatase   Date Value Ref Range Status   03/03/2020 137 (H) 39 - 117 U/L Final     Total Protein   Date Value Ref Range Status   03/03/2020 4.9 (L) 6.0 - 8.5 g/dL Final     ALT (SGPT)   Date Value Ref Range Status   03/03/2020 62 (H) 1 - 33 U/L Final     AST (SGOT)   Date Value Ref Range Status   03/03/2020 81 (H) 1 - 32 U/L Final     Total Bilirubin   Date Value Ref Range Status   03/03/2020 0.9 0.2 - 1.2 mg/dL Final     Albumin   Date Value Ref Range Status   03/03/2020 2.90 (L) 3.50 - 5.20 g/dL Final     Globulin   Date Value Ref Range Status   03/03/2020 2.0 gm/dL Final     Magnesium   Date Value Ref Range Status   03/04/2020 2.1 1.6 - 2.6 mg/dL Final     Phosphorus   Date Value Ref Range Status   03/04/2020 3.5 2.5 - 4.5 mg/dL Final     Lab Results   Component Value Date    HGBA1C 12.9 (H) 03/02/2020     No results found for: GLUF, MICROALBUR  Results from last 7 days   Lab Units 03/05/20  1227 03/05/20  1115 03/05/20  0717 03/05/20  0441 03/04/20  2315 03/04/20  2259   GLUCOSE mg/dL 101 54* 73 72 73 67*             Medication Review: Reviewed.       budesonide-formoterol 2 puff Inhalation BID - RT   clindamycin 300 mg Oral Q8H   enoxaparin 40 mg Subcutaneous Q24H   folic acid 1 mg Oral Daily   gabapentin 100 mg Oral Q8H   insulin glargine 10 Units Subcutaneous QAM   insulin lispro 0-7 Units Subcutaneous TID With Meals   insulin lispro 3 Units Subcutaneous TID With Meals   [START ON 3/6/2020] levoFLOXacin 750 mg Oral Q24H   levothyroxine 25 mcg Oral Q AM   nicotine 1 patch Transdermal  Nightly   sodium chloride 1,000 mL Intravenous Once   sodium chloride 10 mL Intravenous Q12H   sodium chloride 10 mL Intravenous Q12H   THERA 1 tablet Oral Daily   thiamine 100 mg Oral Daily         Assessment/Plan   1.  Diabetes mellitus type 1: Uncontrolled but blood sugars are improving  2.  Hypothyroidism: On levothyroxine supplementation  3.  Severe hypokalemia: Replaced  4.  Severe hypophosphatemia: Replaced  5.  Malnutrition  6.  Lupus.     Plan:  Blood sugar continues to run low, will decrease Lantus to 8 units subcu nightly continue Humalog 3 units with each meal and continue to follow blood sugars and make further adjustments as needed.             José Miguel Regaaldo MD. FACE

## 2020-03-05 NOTE — PLAN OF CARE
Problem: Patient Care Overview  Goal: Plan of Care Review  Outcome: Ongoing (interventions implemented as appropriate)  Flowsheets  Taken 3/5/2020 0904 by Luisa Richey PTA  Progress: improving (Pended)  Taken 3/5/2020 0715 by Misti Farrell, RN  Plan of Care Reviewed With: patient  Taken 3/5/2020 1524 by Misti Farrell, RN  Outcome Summary: Patient continues to complain of tooth pain related to abcsess. Changed IV antibiotic to oral . Has had diarrhea this shift.  Goal: Discharge Needs Assessment  Outcome: Ongoing (interventions implemented as appropriate)  Flowsheets  Taken 3/2/2020 1558 by Aicha Nevarez, RN  Equipment Needed After Discharge: walker, rolling  Equipment Currently Used at Home: none  Transportation Anticipated: family or friend will provide  Concerns to be Addressed: no discharge needs identified;denies needs/concerns at this time  Readmission Within the Last 30 Days: no previous admission in last 30 days  Patient/Family Anticipates Transition to: inpatient rehabilitation facility  Taken 3/1/2020 2002 by Linda Figueroa, RN  Patient/Family Anticipated Services at Transition: none  Goal: Interprofessional Rounds/Family Conf  Outcome: Ongoing (interventions implemented as appropriate)  Flowsheets (Taken 3/5/2020 1524)  Participants: nursing; physician; patient; physical therapy;

## 2020-03-06 LAB
ANION GAP SERPL CALCULATED.3IONS-SCNC: 10 MMOL/L (ref 5–15)
ANION GAP SERPL CALCULATED.3IONS-SCNC: 10 MMOL/L (ref 5–15)
ANION GAP SERPL CALCULATED.3IONS-SCNC: 11 MMOL/L (ref 5–15)
ANION GAP SERPL CALCULATED.3IONS-SCNC: 12 MMOL/L (ref 5–15)
BUN BLD-MCNC: 5 MG/DL (ref 6–20)
BUN BLD-MCNC: 6 MG/DL (ref 6–20)
BUN BLD-MCNC: 6 MG/DL (ref 6–20)
BUN BLD-MCNC: 9 MG/DL (ref 6–20)
BUN/CREAT SERPL: 10.4 (ref 7–25)
BUN/CREAT SERPL: 12.2 (ref 7–25)
BUN/CREAT SERPL: 13.8 (ref 7–25)
BUN/CREAT SERPL: 8.3 (ref 7–25)
CALCIUM SPEC-SCNC: 8.4 MG/DL (ref 8.6–10.5)
CALCIUM SPEC-SCNC: 8.7 MG/DL (ref 8.6–10.5)
CHLORIDE SERPL-SCNC: 101 MMOL/L (ref 98–107)
CHLORIDE SERPL-SCNC: 101 MMOL/L (ref 98–107)
CHLORIDE SERPL-SCNC: 102 MMOL/L (ref 98–107)
CHLORIDE SERPL-SCNC: 102 MMOL/L (ref 98–107)
CO2 SERPL-SCNC: 22 MMOL/L (ref 22–29)
CO2 SERPL-SCNC: 22 MMOL/L (ref 22–29)
CO2 SERPL-SCNC: 23 MMOL/L (ref 22–29)
CO2 SERPL-SCNC: 23 MMOL/L (ref 22–29)
CREAT BLD-MCNC: 0.48 MG/DL (ref 0.57–1)
CREAT BLD-MCNC: 0.49 MG/DL (ref 0.57–1)
CREAT BLD-MCNC: 0.65 MG/DL (ref 0.57–1)
CREAT BLD-MCNC: 0.72 MG/DL (ref 0.57–1)
GFR SERPL CREATININE-BSD FRML MDRD: 131 ML/MIN/1.73
GFR SERPL CREATININE-BSD FRML MDRD: 134 ML/MIN/1.73
GFR SERPL CREATININE-BSD FRML MDRD: 84 ML/MIN/1.73
GFR SERPL CREATININE-BSD FRML MDRD: 95 ML/MIN/1.73
GLUCOSE BLD-MCNC: 222 MG/DL (ref 65–99)
GLUCOSE BLD-MCNC: 273 MG/DL (ref 65–99)
GLUCOSE BLD-MCNC: 323 MG/DL (ref 65–99)
GLUCOSE BLD-MCNC: 428 MG/DL (ref 65–99)
GLUCOSE BLDC GLUCOMTR-MCNC: 150 MG/DL (ref 70–105)
GLUCOSE BLDC GLUCOMTR-MCNC: 299 MG/DL (ref 70–105)
GLUCOSE BLDC GLUCOMTR-MCNC: 301 MG/DL (ref 70–105)
GLUCOSE BLDC GLUCOMTR-MCNC: 331 MG/DL (ref 70–105)
GLUCOSE BLDC GLUCOMTR-MCNC: 339 MG/DL (ref 70–105)
POTASSIUM BLD-SCNC: 4.4 MMOL/L (ref 3.5–5.2)
POTASSIUM BLD-SCNC: 4.8 MMOL/L (ref 3.5–5.2)
SODIUM BLD-SCNC: 133 MMOL/L (ref 136–145)
SODIUM BLD-SCNC: 135 MMOL/L (ref 136–145)
SODIUM BLD-SCNC: 135 MMOL/L (ref 136–145)
SODIUM BLD-SCNC: 136 MMOL/L (ref 136–145)

## 2020-03-06 PROCEDURE — 63710000001 INSULIN LISPRO (HUMAN) PER 5 UNITS: Performed by: INTERNAL MEDICINE

## 2020-03-06 PROCEDURE — 25010000002 ENOXAPARIN PER 10 MG: Performed by: HOSPITALIST

## 2020-03-06 PROCEDURE — 97110 THERAPEUTIC EXERCISES: CPT

## 2020-03-06 PROCEDURE — 97116 GAIT TRAINING THERAPY: CPT

## 2020-03-06 PROCEDURE — 99232 SBSQ HOSP IP/OBS MODERATE 35: CPT | Performed by: INTERNAL MEDICINE

## 2020-03-06 PROCEDURE — 82962 GLUCOSE BLOOD TEST: CPT

## 2020-03-06 PROCEDURE — 80048 BASIC METABOLIC PNL TOTAL CA: CPT | Performed by: HOSPITALIST

## 2020-03-06 PROCEDURE — 94799 UNLISTED PULMONARY SVC/PX: CPT

## 2020-03-06 PROCEDURE — 63710000001 INSULIN GLARGINE PER 5 UNITS: Performed by: INTERNAL MEDICINE

## 2020-03-06 RX ORDER — IPRATROPIUM BROMIDE AND ALBUTEROL SULFATE 2.5; .5 MG/3ML; MG/3ML
3 SOLUTION RESPIRATORY (INHALATION)
Status: DISCONTINUED | OUTPATIENT
Start: 2020-03-06 | End: 2020-03-07 | Stop reason: HOSPADM

## 2020-03-06 RX ORDER — NICOTINE 21 MG/24HR
1 PATCH, TRANSDERMAL 24 HOURS TRANSDERMAL
Status: DISCONTINUED | OUTPATIENT
Start: 2020-03-06 | End: 2020-03-07 | Stop reason: HOSPADM

## 2020-03-06 RX ORDER — SACCHAROMYCES BOULARDII 250 MG
250 CAPSULE ORAL 2 TIMES DAILY
Status: DISCONTINUED | OUTPATIENT
Start: 2020-03-06 | End: 2020-03-07 | Stop reason: HOSPADM

## 2020-03-06 RX ORDER — LEVOFLOXACIN 750 MG/1
750 TABLET ORAL EVERY 24 HOURS
Status: COMPLETED | OUTPATIENT
Start: 2020-03-06 | End: 2020-03-06

## 2020-03-06 RX ORDER — BISMUTH SUBSALICYLATE 262 MG/1
524 TABLET, CHEWABLE ORAL 4 TIMES DAILY
Status: DISCONTINUED | OUTPATIENT
Start: 2020-03-06 | End: 2020-03-07 | Stop reason: HOSPADM

## 2020-03-06 RX ORDER — INSULIN GLARGINE 100 [IU]/ML
10 INJECTION, SOLUTION SUBCUTANEOUS EVERY MORNING
Status: DISCONTINUED | OUTPATIENT
Start: 2020-03-07 | End: 2020-03-07 | Stop reason: HOSPADM

## 2020-03-06 RX ORDER — IPRATROPIUM BROMIDE AND ALBUTEROL SULFATE 2.5; .5 MG/3ML; MG/3ML
3 SOLUTION RESPIRATORY (INHALATION)
Status: DISCONTINUED | OUTPATIENT
Start: 2020-03-06 | End: 2020-03-06

## 2020-03-06 RX ADMIN — ENOXAPARIN SODIUM 40 MG: 40 INJECTION SUBCUTANEOUS at 17:40

## 2020-03-06 RX ADMIN — BISMUTH SUBSALICYLATE 524 MG: 262 TABLET, CHEWABLE ORAL at 21:03

## 2020-03-06 RX ADMIN — NICOTINE 1 PATCH: 14 PATCH TRANSDERMAL at 10:57

## 2020-03-06 RX ADMIN — IPRATROPIUM BROMIDE AND ALBUTEROL SULFATE 3 ML: .5; 3 SOLUTION RESPIRATORY (INHALATION) at 18:40

## 2020-03-06 RX ADMIN — GABAPENTIN 100 MG: 100 CAPSULE ORAL at 21:04

## 2020-03-06 RX ADMIN — BISMUTH SUBSALICYLATE 524 MG: 262 TABLET, CHEWABLE ORAL at 17:42

## 2020-03-06 RX ADMIN — ACETAMINOPHEN 650 MG: 325 TABLET, FILM COATED ORAL at 21:04

## 2020-03-06 RX ADMIN — INSULIN GLARGINE 8 UNITS: 100 INJECTION, SOLUTION SUBCUTANEOUS at 06:19

## 2020-03-06 RX ADMIN — Medication 250 MG: at 10:57

## 2020-03-06 RX ADMIN — IPRATROPIUM BROMIDE AND ALBUTEROL SULFATE 3 ML: .5; 3 SOLUTION RESPIRATORY (INHALATION) at 14:33

## 2020-03-06 RX ADMIN — INSULIN LISPRO 3 UNITS: 100 INJECTION, SOLUTION INTRAVENOUS; SUBCUTANEOUS at 12:45

## 2020-03-06 RX ADMIN — FOLIC ACID 1 MG: 1 TABLET ORAL at 07:49

## 2020-03-06 RX ADMIN — INSULIN LISPRO 1 UNITS: 100 INJECTION, SOLUTION INTRAVENOUS; SUBCUTANEOUS at 12:46

## 2020-03-06 RX ADMIN — BUDESONIDE AND FORMOTEROL FUMARATE DIHYDRATE 2 PUFF: 160; 4.5 AEROSOL RESPIRATORY (INHALATION) at 18:40

## 2020-03-06 RX ADMIN — Medication 250 MG: at 21:04

## 2020-03-06 RX ADMIN — CLINDAMYCIN HYDROCHLORIDE 300 MG: 300 CAPSULE ORAL at 12:45

## 2020-03-06 RX ADMIN — THERA TABS 1 TABLET: TAB at 07:48

## 2020-03-06 RX ADMIN — LEVOFLOXACIN 750 MG: 750 TABLET, FILM COATED ORAL at 12:45

## 2020-03-06 RX ADMIN — BUDESONIDE AND FORMOTEROL FUMARATE DIHYDRATE 2 PUFF: 160; 4.5 AEROSOL RESPIRATORY (INHALATION) at 06:55

## 2020-03-06 RX ADMIN — INSULIN LISPRO 4 UNITS: 100 INJECTION, SOLUTION INTRAVENOUS; SUBCUTANEOUS at 17:41

## 2020-03-06 RX ADMIN — CLINDAMYCIN HYDROCHLORIDE 300 MG: 300 CAPSULE ORAL at 06:18

## 2020-03-06 RX ADMIN — Medication 10 ML: at 21:36

## 2020-03-06 RX ADMIN — ACETAMINOPHEN 650 MG: 325 TABLET, FILM COATED ORAL at 12:45

## 2020-03-06 RX ADMIN — INSULIN LISPRO 3 UNITS: 100 INJECTION, SOLUTION INTRAVENOUS; SUBCUTANEOUS at 17:40

## 2020-03-06 RX ADMIN — BISMUTH SUBSALICYLATE 524 MG: 262 TABLET, CHEWABLE ORAL at 10:57

## 2020-03-06 RX ADMIN — Medication 100 MG: at 07:48

## 2020-03-06 RX ADMIN — OXYCODONE HYDROCHLORIDE 5 MG: 5 TABLET ORAL at 07:48

## 2020-03-06 RX ADMIN — CLINDAMYCIN HYDROCHLORIDE 300 MG: 300 CAPSULE ORAL at 21:04

## 2020-03-06 RX ADMIN — BISMUTH SUBSALICYLATE 524 MG: 262 TABLET, CHEWABLE ORAL at 07:49

## 2020-03-06 RX ADMIN — INSULIN LISPRO 4 UNITS: 100 INJECTION, SOLUTION INTRAVENOUS; SUBCUTANEOUS at 07:50

## 2020-03-06 RX ADMIN — OXYCODONE HYDROCHLORIDE 5 MG: 5 TABLET ORAL at 03:28

## 2020-03-06 RX ADMIN — GABAPENTIN 100 MG: 100 CAPSULE ORAL at 12:45

## 2020-03-06 RX ADMIN — LEVOTHYROXINE SODIUM 25 MCG: 25 TABLET ORAL at 06:23

## 2020-03-06 RX ADMIN — Medication 10 ML: at 07:52

## 2020-03-06 RX ADMIN — INSULIN LISPRO 3 UNITS: 100 INJECTION, SOLUTION INTRAVENOUS; SUBCUTANEOUS at 07:48

## 2020-03-06 RX ADMIN — GABAPENTIN 100 MG: 100 CAPSULE ORAL at 06:18

## 2020-03-06 NOTE — PLAN OF CARE
Problem: Patient Care Overview  Goal: Plan of Care Review  3/6/2020 1721 by Kody Godinez OT  Outcome: Ongoing (interventions implemented as appropriate)  Flowsheets  Taken 3/6/2020 1533 by Carmen Deras RN  Progress: no change  Taken 3/6/2020 1328 by Luisa Richey PTA  Plan of Care Reviewed With: patient (Pended)  Taken 3/6/2020 1721 by Kody Godinez OT  Outcome Summary: Pt. progresses w/ BUE HEP this date 10-15 reps x 1 set theraband exercise, albeit progress limited secondary to continued global weakness BUE 3+/5. Recommend IP rehab at d/c to advance transfer skills and ADL independence prior to d/c home.  3/6/2020 1721 by Kody Godinez OT  Reactivated

## 2020-03-06 NOTE — PROGRESS NOTES
"      [facility] Medicine Services Daily Progress Note      Hospitalist Team  LOS 4 days      Patient Care Team:  Provider, No Known as PCP - General    Patient Location: 226/1      Subjective   Subjective     Chief Complaint / Subjective  Chief Complaint   Patient presents with   • Weakness - Generalized         Brief Synopsis of Hospital Course/HPI      The patient is a 54 y.o. female with past medical history of lupus, alcohol and tobacco abuse who presented to [facility] ED on 3/1/2020 complaining of 9 days of nausea, vomiting, diarrhea and weakness.    The patient has not seen a physician for 3 years and is originally from [place].  She admits to drinking alcohol daily.  The patient has been falling at home because of weakness and denies focal weakness or dizziness before falls.    Date::    3/2/20: Patient received IV fluid boluses for low BP overnight.  Has not seen a physician for 3 years. Originally from [place].  History of lupus.  Not on medications at home.  Afebrile.  Claims to have several days of diarrhea and started on azithromycin for E. coli positive stool.  Endocrinology consulted.  3/3/20: Complained of right facial edema pain and oral pain.  Changed antibiotics to Levaquin and Flagyl.  DC azithromycin.  Blood glucose better controlled.  3/4/20: CT facial bone shows dental abscess and caries.  Afebrile.      Review of Systems   All other systems reviewed and are negative.        Objective   Objective      Vital Signs  Temp:  [98.1 °F (36.7 °C)-98.4 °F (36.9 °C)] 98.4 °F (36.9 °C)  Heart Rate:  [] 92  Resp:  [16-18] 18  BP: ()/(65-71) 103/71  Oxygen Therapy  SpO2: 97 %  Pulse Oximetry Type: Intermittent  Device (Oxygen Therapy): room air  Flow (L/min): 21  Flowsheet Rows      First Filed Value   Admission Height  157.5 cm (62\") Documented at 03/01/2020 1345   Admission Weight  47.8 kg (105 lb 6.1 oz) Documented at 03/01/2020 1345        Intake & Output " (last 3 days)       03/03 0701 - 03/04 0700 03/04 0701 - 03/05 0700 03/05 0701 - 03/06 0700 03/06 0701 - 03/07 0700    P.O. 500 1680 1200     I.V. (mL/kg)        IV Piggyback 100 100 100     Total Intake(mL/kg) 600 (10.7) 1780 (32.9) 1300 (24)     Urine (mL/kg/hr)  240 (0.2)      Stool  0      Total Output  240      Net +600 +1540 +1300             Urine Unmeasured Occurrence 1 x 7 x      Stool Unmeasured Occurrence 3 x 4 x 1 x         Lines, Drains & Airways    Active LDAs     Name:   Placement date:   Placement time:   Site:   Days:    Peripheral IV 03/01/20 1429 Left Antecubital   03/01/20    1429    Antecubital   1    Peripheral IV 03/02/20 1200 Left;Posterior Hand   03/02/20    1200    Hand   less than 1                  Physical Exam:    Physical Exam   Constitutional: She is oriented to person, place, and time. She appears well-developed.   HENT:   Head: Normocephalic.   Mouth/Throat: Mucous membranes are dry.   Eyes: Pupils are equal, round, and reactive to light.   Neck: Trachea normal and full passive range of motion without pain.   Cardiovascular: Normal rate and regular rhythm.   Pulmonary/Chest: Effort normal and breath sounds normal.   Musculoskeletal:   Moves all extremities.   Lymphadenopathy:     She has no cervical adenopathy.   Neurological: She is alert and oriented to person, place, and time. No cranial nerve deficit.   Skin: Skin is warm and dry.   Psychiatric: She has a normal mood and affect. Her speech is normal and behavior is normal. Cognition and memory are normal.     Procedures: None      Results Review:     I reviewed the patient's new clinical results.      Lab Results (last 24 hours)     Procedure Component Value Units Date/Time    POC Glucose Once [[id number]]  (Abnormal) Collected:  03/06/20 1127    Specimen:  Blood Updated:  03/06/20 1137     Glucose 150 mg/dL      Comment: Serial Number: [id number]:  [id number]       POC Glucose Once [[id number]]  (Abnormal) Collected:   03/06/20 0700    Specimen:  Blood Updated:  03/06/20 0702     Glucose 331 mg/dL      Comment: Serial Number: [id number]:  [id number]       POC Glucose Once [[id number]]  (Abnormal) Collected:  03/06/20 0615    Specimen:  Blood Updated:  03/06/20 0617     Glucose 339 mg/dL      Comment: Serial Number: [id number]:  [id number]       Basic Metabolic Panel [[id number]]  (Abnormal) Collected:  03/06/20 0522    Specimen:  Blood Updated:  03/06/20 0611     Glucose 428 mg/dL      BUN 5 mg/dL      Creatinine 0.48 mg/dL      Sodium 133 mmol/L      Potassium 4.4 mmol/L      Chloride 101 mmol/L      CO2 22.0 mmol/L      Calcium 8.4 mg/dL      eGFR Non African Amer 134 mL/min/1.73      BUN/Creatinine Ratio 10.4     Anion Gap 10.0 mmol/L     Narrative:       GFR Normal >60  Chronic Kidney Disease <60  Kidney Failure <15      Basic Metabolic Panel [[id number]]  (Abnormal) Collected:  03/06/20 0000    Specimen:  Blood Updated:  03/06/20 0050     Glucose 222 mg/dL      BUN 6 mg/dL      Creatinine 0.49 mg/dL      Sodium 136 mmol/L      Potassium 4.4 mmol/L      Chloride 102 mmol/L      CO2 22.0 mmol/L      Calcium 8.7 mg/dL      eGFR Non African Amer 131 mL/min/1.73      BUN/Creatinine Ratio 12.2     Anion Gap 12.0 mmol/L     Narrative:       GFR Normal >60  Chronic Kidney Disease <60  Kidney Failure <15      POC Glucose Once [[id number]]  (Normal) Collected:  03/05/20 1913    Specimen:  Blood Updated:  03/05/20 1913     Glucose 103 mg/dL      Comment: Serial Number: [id number]:  [id number]       Basic Metabolic Panel [[id number]]  (Abnormal) Collected:  03/05/20 1745    Specimen:  Blood Updated:  03/05/20 1912     Glucose 71 mg/dL      BUN 6 mg/dL      Creatinine 0.44 mg/dL      Sodium 134 mmol/L      Potassium 4.1 mmol/L      Chloride 102 mmol/L      CO2 21.0 mmol/L      Calcium 8.5 mg/dL      eGFR Non African Amer 148 mL/min/1.73      BUN/Creatinine Ratio 13.6     Anion Gap 11.0 mmol/L     Narrative:       GFR Normal  >60  Chronic Kidney Disease <60  Kidney Failure <15      POC Glucose Once [[id number]]  (Normal) Collected:  03/05/20 1639    Specimen:  Blood Updated:  03/05/20 1640     Glucose 74 mg/dL      Comment: Serial Number: [id number]:  [id number]       Glutamic Acid Decarboxylase [[id number]] Collected:  03/03/20 0249    Specimen:  Blood Updated:  03/05/20 1309     BRAD-65 <5.0 U/mL     Narrative:       Performed at:  [address] [place] [address]  [id number]  : Mikayla Rodriguez MD, Phone:  [phone]    Basic Metabolic Panel [[id number]]  (Abnormal) Collected:  03/05/20 1200    Specimen:  Blood Updated:  03/05/20 1254     Glucose 95 mg/dL      BUN 5 mg/dL      Creatinine 0.52 mg/dL      Sodium 137 mmol/L      Potassium 4.0 mmol/L      Chloride 101 mmol/L      CO2 22.0 mmol/L      Calcium 8.9 mg/dL      eGFR Non African Amer 122 mL/min/1.73      BUN/Creatinine Ratio 9.6     Anion Gap 14.0 mmol/L     Narrative:       GFR Normal >60  Chronic Kidney Disease <60  Kidney Failure <15          No results found for: HGBA1C  Results from last 7 days   Lab Units 03/01/20  1432   INR  0.98           Lab Results   Component Value Date    LIPASE 39 03/01/2020     Lab Results   Component Value Date    CHOL 98 03/02/2020    TRIG 163 (H) 03/02/2020    HDL 38 (L) 03/02/2020    LDL 27 03/02/2020       No results found for: INTRAOP, PREDX, FINALDX, COMDX    Microbiology Results (last 10 days)     Procedure Component Value - Date/Time    Gastrointestinal Panel, PCR - Stool, Per Rectum [[id number]]  (Abnormal) Collected:  03/02/20 0440    Lab Status:  Final result Specimen:  Stool from Per Rectum Updated:  03/02/20 0841     Campylobacter Not Detected     Plesiomonas shigelloides Not Detected     Salmonella Not Detected     Vibrio Not Detected     Vibrio cholerae Not Detected     Yersinia enterocolitica Not Detected     Enteroaggregative E. coli (EAEC) Detected     Enteropathogenic E. coli (EPEC) Not  Detected     Enterotoxigenic E. coli (ETEC) lt/st Not Detected     Shiga-like toxin-producing E. coli (STEC) stx1/stx2 Not Detected     E. coli O157 Not Detected     Shigella/Enteroinvasive E. coli (EIEC) Not Detected     Cryptosporidium Not Detected     Cyclospora cayetanensis Not Detected     Entamoeba histolytica Not Detected     Giardia lamblia Not Detected     Adenovirus F40/41 Not Detected     Astrovirus Not Detected     Norovirus GI/GII Not Detected     Rotavirus A Not Detected     Sapovirus (I, II, IV or V) Not Detected    Narrative:       If Aeromonas, Staphylococcus aureus or Bacillus cereus are suspected, please order [id number]: Stool Culture, Aeromonas, S aureus, B Cereus.    Respiratory Panel, PCR - Swab, Nasopharynx [[id number]]  (Normal) Collected:  03/02/20 0434    Lab Status:  Final result Specimen:  Swab from Nasopharynx Updated:  03/02/20 0629     ADENOVIRUS, PCR Not Detected     Coronavirus 229E Not Detected     Coronavirus HKU1 Not Detected     Coronavirus NL63 Not Detected     Coronavirus OC43 Not Detected     Human Metapneumovirus Not Detected     Human Rhinovirus/Enterovirus Not Detected     Influenza B PCR Not Detected     Parainfluenza Virus 1 Not Detected     Parainfluenza Virus 2 Not Detected     Parainfluenza Virus 3 Not Detected     Parainfluenza Virus 4 Not Detected     Bordetella pertussis pcr Not Detected     Influenza A H1 2009 PCR Not Detected     Chlamydophila pneumoniae PCR Not Detected     Mycoplasma pneumo by PCR Not Detected     Influenza A PCR Not Detected     Influenza A H3 Not Detected     Influenza A H1 Not Detected     RSV, PCR Not Detected    Urine Culture - Urine, Urine, Clean Catch [[id number]] Collected:  03/01/20 1530    Lab Status:  Final result Specimen:  Urine, Clean Catch Updated:  03/02/20 1634     Urine Culture >100,000 CFU/mL Mixed Lulu Isolated    Narrative:       Specimen contains mixed organisms of questionable pathogenicity which indicates contamination with  commensal emre.  Further identification is unlikely to provide clinically useful information.  Suggest recollection.          ECG/EMG Results (most recent)     Procedure Component Value Units Date/Time    ECG 12 Lead [[id number]]  (Abnormal) Resulted:  03/01/20 1624     Updated:  03/01/20 1624    ECG 12 Lead [[id number]] Collected:  03/02/20 0301     Updated:  03/02/20 0302    Narrative:       HEART RATE= 94  bpm  RR Interval= 636  ms  AR Interval= 130  ms  P Horizontal Axis= -10  deg  P Front Axis= 77  deg  QRSD Interval= 85  ms  QT Interval= 431  ms  QRS Axis= 82  deg  T Wave Axis= 265  deg  - ABNORMAL ECG -  Sinus rhythm  Repol abnrm suggests ischemia, diffuse leads  Prolonged QT interval  Electronically Signed By:   Date and Time of Study: 2020-03-02 03:01:10    ECG 12 Lead [[id number]] Collected:  03/01/20 1405     Updated:  03/02/20 0726    Narrative:       HEART RATE= 93  bpm  RR Interval= 644  ms  AR Interval= 136  ms  P Horizontal Axis= -4  deg  P Front Axis= 68  deg  QRSD Interval= 84  ms  QT Interval= 393  ms  QRS Axis= 75  deg  T Wave Axis= 262  deg  - ABNORMAL ECG -  Sinus rhythm  LAE, consider biatrial enlargement  Repol abnrm suggests ischemia, diffuse leads  Electronically Signed By: Berlin Smith ([facility]) 02-Mar-2020 07:26:06  Date and Time of Study: 2020-03-01 14:05:28                     Gallbladder    Result Date: 3/2/2020  1. Cholecystectomy. Normal caliber of the biliary tree. 2. Severe hepatic steatosis. 3. The right kidney shows no evidence of shadowing stone or hydronephrosis. Electronically signed by:  Mike Valdes M.D.  3/2/2020 1:11 AM    Xr Chest 1 View    Result Date: 3/2/2020  Impression: 1. Prominence of bronchopulmonary markings which can be seen in viral airway disease.  Please correlate for bronchitis. 2. No focal infiltrate. Electronically signed by:  Marsha Omalley M.D.  3/2/2020 1:47 AM    Ct Facial Bones With Contrast    Result Date: 3/4/2020   Multiple dental caries and  periapical lucencies consistent with dental abscesses. Dental x-rays would be more useful at outpatient follow-up.  Electronically Signed By-Josef Matson On:3/4/2020 2:43 PM This report was finalized on [id number] by  Josef Matson, .          Xrays, labs reviewed personally by physician.    Medication Review:   I have reviewed the patient's current medication list      Scheduled Meds    bismuth subsalicylate 524 mg Oral 4x Daily   budesonide-formoterol 2 puff Inhalation BID - RT   clindamycin 300 mg Oral Q8H   enoxaparin 40 mg Subcutaneous Q24H   folic acid 1 mg Oral Daily   gabapentin 100 mg Oral Q8H   [START ON 3/7/2020] insulin glargine 10 Units Subcutaneous QAM   insulin lispro 0-7 Units Subcutaneous TID With Meals   insulin lispro 3 Units Subcutaneous TID With Meals   ipratropium-albuterol 3 mL Nebulization 4x Daily - RT   levoFLOXacin 750 mg Oral Q24H   levothyroxine 25 mcg Oral Q AM   nicotine 1 patch Transdermal Q24H   saccharomyces boulardii 250 mg Oral BID   sodium chloride 1,000 mL Intravenous Once   sodium chloride 10 mL Intravenous Q12H   sodium chloride 10 mL Intravenous Q12H   THERA 1 tablet Oral Daily   thiamine 100 mg Oral Daily       Meds Infusions       Meds PRN  •  acetaminophen  •  Calcium Gluconate-NaCl **AND** calcium gluconate **AND** Calcium, Ionized  •  dextrose  •  dextrose  •  glucagon (human recombinant)  •  magnesium sulfate **OR** magnesium sulfate **OR** magnesium sulfate  •  melatonin  •  nitroglycerin  •  ondansetron **OR** ondansetron  •  potassium & sodium phosphates **OR** potassium & sodium phosphates  •  potassium chloride  •  potassium chloride  •  potassium chloride  •  [COMPLETED] Insert peripheral IV **AND** sodium chloride  •  sodium chloride  •  sodium chloride      Assessment/Plan   Assessment/Plan     Active Hospital Problems    Diagnosis  POA   • **Hyperglycemia [R73.9]  Yes   • Hypophosphatemia [E83.39]  Yes   • Starvation ketoacidosis [E87.2]  Yes   • Type 2  diabetes mellitus (CMS/HCC) [E11.9]  Yes   • Hypokalemia, inadequate intake [E87.6]  Yes   • Tobacco abuse [Z72.0]  Unknown   • Alcoholism /alcohol abuse (CMS/[facility]) [F10.20]  Yes   • Hyponatremia [E87.1]  Yes   • Transaminitis [R74.0]  Yes   • Insomnia [G47.00]  Yes      Resolved Hospital Problems   No resolved problems to display.       MEDICAL DECISION MAKING COMPLEXITY BY PROBLEM:     1.  New onset diabetes mellitus , labile  -Hemoglobin A1c 12.9  -Continue Lantus, mealtime insulin and ISS   -Endocrinology and diabetic nurse educator consulted  - remains wide fluctuating- lantus dose followed by Dr. Regalado     2.  Infectious diarrhea  ; due to E.coli( EAEC)  ; received zithromax x2 dose and Levaquin x4dose - will complete levaquine today for 5 days course  ; still having diarrhea - peptobismol - scheduled  ; continue supportive tx and avoid dehydration          3.  Right facial cellulitis with dental abscess, POA   -s/p CT facial bone   - no oral surgery service  - reports she was trying to see oral surgeon as outpt but cannot afford  - on clindamycin, stable    4.  New hypothyroidism:  -Started on Synthroid by endocrinology    5. Hypokalemia and hypophosphatemia:  -Replacement protocol ordered  -Consult nutrition for calorie count  - resolved     6.  Alcohol abuse  -CIWA protocol ordered  - on MVI, thiamine, neurontin  - no sign of active withdrawal- dc ativan     7.  Tobacco abuse  -Nicotine patch ordered    8.  Lupus:  -Has not been on Plaquenil for 3 years    9. COPD without exacerbation  ; duoneb, on steroid inhaler     10. Acute deconditioning  - still very frail , walking with PT but very SOB on exertion due to COPD    11. Cachexia and moderate protein and calorie malnutrition      VTE Prophylaxis -   Mechanical Order History:     None      Pharmalogical Order History:     Ordered     Dose Route Frequency Stop    03/02/20 0821  enoxaparin (LOVENOX) syringe 40 mg      40 mg SC Every 24 Hours --    03/01/20  2030  enoxaparin (LOVENOX) syringe 30 mg  Status:  Discontinued      30 mg SC Daily 03/02/20 0835            Code Status -   Code Status and Medical Interventions:   Ordered at: 03/01/20 7998     Level Of Support Discussed With:    Patient     Code Status:    CPR     Medical Interventions (Level of Support Prior to Arrest):    Full       Discharge Planning   Pending rehab discharge but diarrhea is persistent and very frequent x12 yesterday          Destination      Coordination has not been started for this encounter.      Durable Medical Equipment      Coordination has not been started for this encounter.      Dialysis/Infusion      Coordination has not been started for this encounter.      Home Medical Care      Coordination has not been started for this encounter.      Therapy      Coordination has not been started for this encounter.      Community Resources      Coordination has not been started for this encounter.            Electronically signed by Esme Stanton MD, 03/06/20, 12:40 PM.  [facility] Hospitalist Team

## 2020-03-06 NOTE — PROGRESS NOTES
Continued Stay Note   [facility]ad     Patient Name: Smita Steele  MRN: [id number]  Today's Date: 3/6/2020    Admit Date: 3/1/2020    Discharge Plan     Row Name 03/06/20 0928       Plan    Plan  DC Plan: [facility] Accepted. PASRR approved. No precert required (E insurance).    Plan Comments  DC Barriers: IV antibiotics and continued insulin adjustments.                     Estephanie Garcia RN

## 2020-03-06 NOTE — PROGRESS NOTES
Daily Progress Note    Patient Care Team:  Provider, No Known as PCP - General    Chief Complaint: Follow-up type 1 diabetes    HPI:  Patient seen and examined today.  Looking much better.  Eating well.  She did receive Lantus 10 units yesterday morning.  Currently on Lantus 8 units subcu daily along with Humalog 3 units with each meal.  Blood sugars are running little bit high.  No complaints at this time.  C-peptide low consistent with type 1 diabetes.     ROS:   Constitutional:  Denies fatigue, tiredness.    Eyes:  Denies change in visual acuity   HENT:  Denies nasal congestion or sore throat   Respiratory: denies cough, shortness of breath.   Cardiovascular:  denies chest pain, edema   GI:  Denies abdominal pain, nausea, vomiting.   :  Denies polyuria and polydipsia  Musculoskeletal:  Denies back pain or joint pain   Integument:  Denies rash   Neurologic:  Denies headache, focal weakness or sensory changes   Endocrine:  Denies polyuria or polydipsia   Psychiatric:  Denies depression or anxiety       Vitals:    03/06/20 0655   BP:    Pulse: 90   Resp: 18   Temp:    SpO2: 98%       Physical Exam:  GEN: NAD, conversant  EYES: EOMI, PERRL, no conjunctival erythema  NECK: no thyromegaly, full ROM   CV: RRR, no murmurs/rubs/gallops, no peripheral edema  LUNG: CTAB, no wheezes/rales/ronchi  SKIN: no rashes, no acanthosis  MSK: no deformities, full ROM of all extremities  NEURO: no tremors, DTR normal  PSYCH: AOX3, appropriate mood, affect normal      Results Review:     I reviewed the patient's new clinical results.    Glucose   Date Value Ref Range Status   03/06/2020 428 (C) 65 - 99 mg/dL Final     Sodium   Date Value Ref Range Status   03/06/2020 133 (L) 136 - 145 mmol/L Final     Potassium   Date Value Ref Range Status   03/06/2020 4.4 3.5 - 5.2 mmol/L Final     CO2   Date Value Ref Range Status   03/06/2020 22.0 22.0 - 29.0 mmol/L Final     Chloride   Date Value Ref Range Status   03/06/2020 101 98 - 107  mmol/L Final     Anion Gap   Date Value Ref Range Status   03/06/2020 10.0 5.0 - 15.0 mmol/L Final     Creatinine   Date Value Ref Range Status   03/06/2020 0.48 (L) 0.57 - 1.00 mg/dL Final     BUN   Date Value Ref Range Status   03/06/2020 5 (L) 6 - 20 mg/dL Final     BUN/Creatinine Ratio   Date Value Ref Range Status   03/06/2020 10.4 7.0 - 25.0 Final     Calcium   Date Value Ref Range Status   03/06/2020 8.4 (L) 8.6 - 10.5 mg/dL Final     eGFR Non  Amer   Date Value Ref Range Status   03/06/2020 134 >60 mL/min/1.73 Final     Magnesium   Date Value Ref Range Status   03/04/2020 2.1 1.6 - 2.6 mg/dL Final     Phosphorus   Date Value Ref Range Status   03/04/2020 3.5 2.5 - 4.5 mg/dL Final     Lab Results   Component Value Date    HGBA1C 12.9 (H) 03/02/2020     No results found for: GLUF, MICROALBUR  Results from last 7 days   Lab Units 03/06/20  0700 03/06/20  0615 03/05/20  1913 03/05/20  1639 03/05/20  1227 03/05/20  1115   GLUCOSE mg/dL 331* 339* 103 74 101 54*             Medication Review: Reviewed.       budesonide-formoterol 2 puff Inhalation BID - RT   clindamycin 300 mg Oral Q8H   enoxaparin 40 mg Subcutaneous Q24H   folic acid 1 mg Oral Daily   gabapentin 100 mg Oral Q8H   insulin glargine 8 Units Subcutaneous QAM   insulin lispro 0-7 Units Subcutaneous TID With Meals   insulin lispro 3 Units Subcutaneous TID With Meals   levoFLOXacin 750 mg Oral Q24H   levothyroxine 25 mcg Oral Q AM   nicotine 1 patch Transdermal Nightly   sodium chloride 1,000 mL Intravenous Once   sodium chloride 10 mL Intravenous Q12H   sodium chloride 10 mL Intravenous Q12H   THERA 1 tablet Oral Daily   thiamine 100 mg Oral Daily         Assessment/Plan   1.  Diabetes mellitus type 1: Uncontrolled with high blood sugars.  2.  Hypothyroidism: On levothyroxine supplementation  3.  Severe hypokalemia: Replaced  4.  Severe hypophosphatemia: Replaced  5.  Malnutrition  6.  Lupus.     Plan:  We will increase Lantus back to 10 units  subcu daily and continue Humalog 3 with each meal and follow blood sugars and make further adjustments.             José Miguel Regalado MD. FACE

## 2020-03-06 NOTE — PLAN OF CARE
Problem: Patient Care Overview  Goal: Plan of Care Review  Outcome: Outcome(s) achieved  Flowsheets  Taken 3/6/2020 1533 by Carmen Deras RN  Progress: no change  Outcome Summary: Patient has felt weak throughout the shift. She has had complaints of mouth pain due to the abcessed tooth. No other complaints. [facility] Will continue to monitor.  Taken 3/6/2020 1328 by Luisa Richey PTA  Plan of Care Reviewed With: patient (Pended)

## 2020-03-06 NOTE — THERAPY TREATMENT NOTE
Patient Name: Smita Steele  : 1965    MRN: [id number]                              Today's Date: 3/6/2020       Admit Date: 3/1/2020    Visit Dx:     ICD-10-CM ICD-9-CM   1. Hypokalemia, inadequate intake E87.6 276.8   2. Non-intractable vomiting with nausea, unspecified vomiting type R11.2 787.01   3. Diarrhea, unspecified type R19.7 787.91   4. Dehydration E86.0 276.51   5. Hyperglycemia R73.9 790.29   6. Oral thrush B37.0 112.0     Patient Active Problem List   Diagnosis   • Hypokalemia, inadequate intake   • Hyperglycemia   • Tobacco abuse   • Alcoholism /alcohol abuse (CMS/HCC)   • Hyponatremia   • Transaminitis   • Insomnia   • Hypophosphatemia   • Starvation ketoacidosis   • Type 2 diabetes mellitus (CMS/HCC)     Past Medical History:   Diagnosis Date   • Lupus (CMS/HCC)      Past Surgical History:   Procedure Laterality Date   •  SECTION     • CHOLECYSTECTOMY     • HYSTERECTOMY       General Information     Row Name 20 1312          PT Evaluation Time/Intention    Document Type  therapy note (daily note)  (Pended)   -     Mode of Treatment  physical therapy  (Pended)   -     Row Name 20 1312          General Information    Patient Profile Reviewed?  yes  (Pended)   -BM     Existing Precautions/Restrictions  fall  (Pended)   -BM     Row Name 20 1312          Cognitive Assessment/Intervention- PT/OT    Orientation Status (Cognition)  oriented x 4  (Pended)   -     Row Name 20 1312          Safety Issues, Functional Mobility    Impairments Affecting Function (Mobility)  balance;endurance/activity tolerance;postural/trunk control;shortness of breath  (Pended)   -       User Key  (r) = Recorded By, (t) = Taken By, (c) = Cosigned By    Initials Name Provider Type    BM Luisa Richey PTA Physical Therapy Assistant        Mobility     Row Name 20 1313          Bed Mobility Assessment/Treatment    Bed Mobility Assessment/Treatment  other (see comments)   (Pended)   -BM     Supine-Sit [place] (Bed Mobility)  other (see comments)  (Pended)   -BM     Comment (Bed Mobility)  Pt was up in chair today   (Pended)   -BM     Row Name 03/06/20 1313          Bed-Chair Transfer    Bed-Chair [place] (Transfers)  independent  (Pended)   -BM     Row Name 03/06/20 1313          Sit-Stand Transfer    Sit-Stand [place] (Transfers)  independent  (Pended)   -BM     Assistive Device (Sit-Stand Transfers)  walker, front-wheeled  (Pended)   -BM     Row Name 03/06/20 1313          Gait/Stairs Assessment/Training    Gait/Stairs Assessment/Training  gait/ambulation assistive device  (Pended)   -BM     [place] Level (Gait)  contact guard  (Pended)   -BM     Assistive Device (Gait)  walker, front-wheeled  (Pended)   -BM     Distance in Feet (Gait)  10' w/ rwx, pt limited by weakness in BLE today  (Pended)   -BM     Deviations/Abnormal Patterns (Gait)  base of support, narrow;festinating/shuffling;padmini decreased  (Pended)   -BM     Comment (Gait/Stairs)  weakness noted during gait, pt reported that it felt like her legs were going to give out. She was significantly unsteady even w/ rwx  (Pended)   -BM       User Key  (r) = Recorded By, (t) = Taken By, (c) = Cosigned By    Initials Name Provider Type     Luisa Richey PTA Physical Therapy Assistant        Obj/Interventions     Row Name 03/06/20 1319          Therapeutic Exercise    Lower Extremity (Therapeutic Exercise)  marching while seated;LAQ (long arc quad), bilateral;other (see comments)  (Pended)   -BM     Sets/Reps (Therapeutic Exercise)  1x15  (Pended)   -BM     Comment (Therapeutic Exercise)  pt completes 1x15 LAQ, seated marches, heel/toe raises  (Pended)   -BM     Row Name 03/06/20 1319          Static Sitting Balance    Level of [place] (Unsupported Sitting, Static Balance)  independent  (Pended)   -BM     Sitting Position (Unsupported Sitting, Static Balance)  sitting in chair  (Pended)   -BM      Time Able to Maintain Position (Unsupported Sitting, Static Balance)  more than 5 minutes  (Pended)   -     Row Name 03/06/20 1319          Dynamic Sitting Balance    Level of [place], Reaches Outside Midline (Sitting, Dynamic Balance)  independent  (Pended)   -BM     Sitting Position, Reaches Outside Midline (Sitting, Dynamic Balance)  sitting in chair  (Pended)   -     Row Name 03/06/20 1319          Static Standing Balance    Level of [place] (Supported Standing, Static Balance)  contact guard assist  (Pended)   -BM     Time Able to Maintain Position (Supported Standing, Static Balance)  45 to 60 seconds  (Pended)   -BM     Assistive Device Utilized (Supported Standing, Static Balance)  walker, rolling platform  (Pended)   -     Row Name 03/06/20 1319          Dynamic Standing Balance    Level of [place], Reaches Outside Midline (Standing, Dynamic Balance)  contact guard assist  (Pended)   -BM     Time Able to Maintain Position, Reaches Outside Midline (Standing, Dynamic Balance)  15 to 30 seconds  (Pended)   -     Assistive Device Utilized (Supported Standing, Dynamic Balance)  walker, rolling  (Pended)   -     Comment, Reaches Outside Midline (Standing, Dynamic Balance)  pt reported she felt unsteady when in standing   (Pended)   -       User Key  (r) = Recorded By, (t) = Taken By, (c) = Cosigned By    Initials Name Provider Type    Luisa Talley PTA Physical Therapy Assistant        Goals/Plan    No documentation.       Clinical Impression     Row Name 03/06/20 1098          Pain Scale: Numbers Pre/Post-Treatment    Pain Scale: Numbers, Pretreatment  0/10 - no pain  (Pended)   -     Pain Scale: Numbers, Post-Treatment  0/10 - no pain  (Pended)   -     Row Name 03/06/20 9996          Plan of Care Review    Plan of Care Reviewed With  patient  (Pended)   -     Progress  improving  (Pended)   -     Outcome Summary  Pt complete exercises with weakness noted in BLE. When  in standing pt reported that her legs felt like they were going to buckle and she felt unsteady. Pt stood for about a min and was steady enough to walk.  When walking pt was shakey and able to walk 10' before having to get back in the chair.  D/t pt weakness IP rehab to continue PT services is recommended  (Pended)   -BM     Row Name 03/06/20 1327          Positioning and Restraints    Pre-Treatment Position  sitting in chair/recliner  (Pended)   -BM     Post Treatment Position  chair  (Pended)   -BM     In Chair  notified nsg;sitting;call light within reach;encouraged to call for assist  (Pended)   -BM       User Key  (r) = Recorded By, (t) = Taken By, (c) = Cosigned By    Initials Name Provider Type    Luisa Talley PTA Physical Therapy Assistant        Outcome Measures     Row Name 03/06/20 5874          How much help from another person do you currently need...    Turning from your back to your side while in flat bed without using bedrails?  4  (Pended)   -BM     Moving from lying on back to sitting on the side of a flat bed without bedrails?  4  (Pended)   -BM     Moving to and from a bed to a chair (including a wheelchair)?  3  (Pended)   -BM     Standing up from a chair using your arms (e.g., wheelchair, bedside chair)?  3  (Pended)   -BM     Climbing 3-5 steps with a railing?  3  (Pended)   -BM     To walk in hospital room?  3  (Pended)   -BM     AM-PAC 6 Clicks Score (PT)  20  (Pended)   -SL (r) BM (t)       User Key  (r) = Recorded By, (t) = Taken By, (c) = Cosigned By    Initials Name Provider Type    Carmen Wilson RN Registered Nurse    Luisa Talley PTA Physical Therapy Assistant          PT Recommendation and Plan     Outcome Summary/Treatment Plan (PT)  Anticipated Discharge Disposition (PT): (P) inpatient rehabilitation facility  Plan of Care Reviewed With: (P) patient  Progress: (P) no change  Outcome Summary: (P) Pt complete exercises with weakness noted in BLE. When in  standing pt reported that her legs felt like they were going to buckle and she felt unsteady. Pt stood for about a min and was steady enough to walk. When walking pt was shakey and able to walk 10' before having to get back in the chair. D/t pt weakness IP rehab to continue PT services is recommended.     Time Calculation:   PT Charges     Row Name 03/06/20 1343             Time Calculation    Start Time  1040  (Pended)   -BM      Stop Time  1055  (Pended)   -BM      Time Calculation (min)  15 min  (Pended)   -BM      PT Received On  03/06/20  (Pended)   -BM      PT - Next Appointment  03/07/20  (Pended)   -BM         Time Calculation- PT    Total Timed Code Minutes- PT  15 minute(s)  (Pended)   -BM         Timed Charges    29934 - PT Therapeutic Exercise Minutes  10  (Pended)   -BM      44299 - Gait Training Minutes   --  (Pended)   -BM        User Key  (r) = Recorded By, (t) = Taken By, (c) = Cosigned By    Initials Name Provider Type     Luisa Richey PTA Physical Therapy Assistant        Therapy Charges for Today     Code Description Service Date Service Provider Modifiers Qty    82243635924 HC PT THER PROC EA 15 MIN 3/5/2020 Luisa Richey PTA GP 1    47927886014 HC GAIT TRAINING EA 15 MIN 3/5/2020 Luisa Richey PTA GP 1    37510674177 HC PT THER PROC EA 15 MIN 3/6/2020 Luisa Richey PTA GP 1          PT G-Codes  Outcome Measure Options: AM-PAC 6 Clicks Basic Mobility (PT)  AM-PAC 6 Clicks Score (PT): (P) 20    Luisa Richey PTA  3/6/2020

## 2020-03-06 NOTE — PLAN OF CARE
Problem: Patient Care Overview  Goal: Plan of Care Review  Outcome: Ongoing (interventions implemented as appropriate)  Flowsheets  Taken 3/6/2020 1340  Progress: no change (Pended)  Outcome Summary: Pt complete exercises with weakness noted in BLE. When in standing pt reported that her legs felt like they were going to buckle and she felt unsteady. Pt stood for about a min and was steady enough to walk. When walking pt became shaky and able to walk 10' before having to get back in the chair. D/t pt weakness IP rehab recommended to continue PT services(Pended)  Taken 3/6/2020 1328  Plan of Care Reviewed With: patient (Pended)

## 2020-03-06 NOTE — PAYOR COMM NOTE
"Updated clinicals for inpt authorization request.        RETURN CONTACT  TOMASA JESUS RN   [facility]   U.KENDAL /    PH: [phone]  FX: [phone]    Smita Mancini (54 y.o. Female)     Date of Birth Social Security Number Address Home Phone MRN    [id number]  [id number] [place] [address] [phone] [id number]    Mu-ism Marital Status          Unknown Single       Admission Date Admission Type Admitting Provider Attending Provider Department, Room/Bed    3/1/20 Emergency Esme Trammell MD [facility]    Discharge Date Discharge Disposition Discharge Destination                       Attending Provider:  Esme Trammell MD    Allergies:  Penicillins    Isolation:  None   Infection:  None   Code Status:  CPR    Ht:  157.5 cm (62.01\")   Wt:  54.1 kg (119 lb 4.3 oz)    Admission Cmt:  None   Principal Problem:  Hyperglycemia [R73.9]                 Active Insurance as of 3/1/2020     Primary Coverage     Payor Plan Insurance Group Employer/Plan Group    [place]      Payor Plan Address Payor Plan Phone Number Payor Plan Fax Number Effective Dates    [address]   1/1/2020 - None Entered    [place] [address]       Subscriber Name Subscriber Birth Date Member ID       SMITA MANCINI 1965 [id number]                 Emergency Contacts      (Rel.) Home Phone Work Phone Mobile Phone    MILADYS WHITE (Daughter) -- -- [phone]               Physician Progress Notes (last 24 hours) (Notes from 03/05/20 1002 through 03/06/20 1002)      José Miguel Regalado MD at 03/06/20 0748                 Daily Progress Note    Patient Care Team:  Provider, No Known as PCP - General    Chief Complaint: Follow-up type 1 diabetes    HPI:  Patient seen and examined today.  Looking much better.  Eating well.  She did receive Lantus 10 units yesterday morning.  Currently on Lantus 8 " units subcu daily along with Humalog 3 units with each meal.  Blood sugars are running little bit high.  No complaints at this time.  C-peptide low consistent with type 1 diabetes.     ROS:   Constitutional:  Denies fatigue, tiredness.    Eyes:  Denies change in visual acuity   HENT:  Denies nasal congestion or sore throat   Respiratory: denies cough, shortness of breath.   Cardiovascular:  denies chest pain, edema   GI:  Denies abdominal pain, nausea, vomiting.   :  Denies polyuria and polydipsia  Musculoskeletal:  Denies back pain or joint pain   Integument:  Denies rash   Neurologic:  Denies headache, focal weakness or sensory changes   Endocrine:  Denies polyuria or polydipsia   Psychiatric:  Denies depression or anxiety       Vitals:    03/06/20 0655   BP:    Pulse: 90   Resp: 18   Temp:    SpO2: 98%       Physical Exam:  GEN: NAD, conversant  EYES: EOMI, PERRL, no conjunctival erythema  NECK: no thyromegaly, full ROM   CV: RRR, no murmurs/rubs/gallops, no peripheral edema  LUNG: CTAB, no wheezes/rales/ronchi  SKIN: no rashes, no acanthosis  MSK: no deformities, full ROM of all extremities  NEURO: no tremors, DTR normal  PSYCH: AOX3, appropriate mood, affect normal      Results Review:     I reviewed the patient's new clinical results.    Glucose   Date Value Ref Range Status   03/06/2020 428 (C) 65 - 99 mg/dL Final     Sodium   Date Value Ref Range Status   03/06/2020 133 (L) 136 - 145 mmol/L Final     Potassium   Date Value Ref Range Status   03/06/2020 4.4 3.5 - 5.2 mmol/L Final     CO2   Date Value Ref Range Status   03/06/2020 22.0 22.0 - 29.0 mmol/L Final     Chloride   Date Value Ref Range Status   03/06/2020 101 98 - 107 mmol/L Final     Anion Gap   Date Value Ref Range Status   03/06/2020 10.0 5.0 - 15.0 mmol/L Final     Creatinine   Date Value Ref Range Status   03/06/2020 0.48 (L) 0.57 - 1.00 mg/dL Final     BUN   Date Value Ref Range Status   03/06/2020 5 (L) 6 - 20 mg/dL Final     BUN/Creatinine  Ratio   Date Value Ref Range Status   03/06/2020 10.4 7.0 - 25.0 Final     Calcium   Date Value Ref Range Status   03/06/2020 8.4 (L) 8.6 - 10.5 mg/dL Final     eGFR Non  Amer   Date Value Ref Range Status   03/06/2020 134 >60 mL/min/1.73 Final     Magnesium   Date Value Ref Range Status   03/04/2020 2.1 1.6 - 2.6 mg/dL Final     Phosphorus   Date Value Ref Range Status   03/04/2020 3.5 2.5 - 4.5 mg/dL Final     Lab Results   Component Value Date    HGBA1C 12.9 (H) 03/02/2020     No results found for: GLUF, MICROALBUR  Results from last 7 days   Lab Units 03/06/20  0700 03/06/20  0615 03/05/20  1913 03/05/20  1639 03/05/20  1227 03/05/20  1115   GLUCOSE mg/dL 331* 339* 103 74 101 54*             Medication Review: Reviewed.       budesonide-formoterol 2 puff Inhalation BID - RT   clindamycin 300 mg Oral Q8H   enoxaparin 40 mg Subcutaneous Q24H   folic acid 1 mg Oral Daily   gabapentin 100 mg Oral Q8H   insulin glargine 8 Units Subcutaneous QAM   insulin lispro 0-7 Units Subcutaneous TID With Meals   insulin lispro 3 Units Subcutaneous TID With Meals   levoFLOXacin 750 mg Oral Q24H   levothyroxine 25 mcg Oral Q AM   nicotine 1 patch Transdermal Nightly   sodium chloride 1,000 mL Intravenous Once   sodium chloride 10 mL Intravenous Q12H   sodium chloride 10 mL Intravenous Q12H   THERA 1 tablet Oral Daily   thiamine 100 mg Oral Daily         Assessment/Plan   1.  Diabetes mellitus type 1: Uncontrolled with high blood sugars.  2.  Hypothyroidism: On levothyroxine supplementation  3.  Severe hypokalemia: Replaced  4.  Severe hypophosphatemia: Replaced  5.  Malnutrition  6.  Lupus.     Plan:  We will increase Lantus back to 10 units subcu daily and continue Humalog 3 with each meal and follow blood sugars and make further adjustments.            José Miguel Regalado MD. FACE                Electronically signed by José Miguel Regalado MD at 03/06/20 5110     José Miguel Regalado MD at 03/05/20 1413                 Daily Progress  Note    Patient Care Team:  Provider, No Known as PCP - General    Chief Complaint: Follow-up type 1 diabetes    HPI: Patient seen and examined today.  Looking much better.  Eating well.  Blood sugars are running on the low normal side.  No complaints at this time.  C-peptide low consistent with type 1 diabetes.     ROS:   Constitutional:  Denies fatigue, tiredness.    Eyes:  Denies change in visual acuity   HENT:  Denies nasal congestion or sore throat   Respiratory: denies cough, shortness of breath.   Cardiovascular:  denies chest pain, edema   GI:  Denies abdominal pain, nausea, vomiting.   :  Denies polyuria and polydipsia  Musculoskeletal:  Denies back pain or joint pain   Integument:  Denies rash   Neurologic:  Denies headache, focal weakness or sensory changes   Endocrine:  Denies polyuria or polydipsia   Psychiatric:  Denies depression or anxiety       Vitals:    03/05/20 1124   BP: 105/73   Pulse: 103   Resp: 16   Temp: 97.9 °F (36.6 °C)   SpO2: 100%       Physical Exam:  GEN: NAD, conversant  EYES: EOMI, PERRL, no conjunctival erythema  NECK: no thyromegaly, full ROM   CV: RRR, no murmurs/rubs/gallops, no peripheral edema  LUNG: CTAB, no wheezes/rales/ronchi  SKIN: no rashes, no acanthosis  MSK: no deformities, full ROM of all extremities  NEURO: no tremors, DTR normal  PSYCH: AOX3, appropriate mood, affect normal      Results Review:     I reviewed the patient's new clinical results.    Glucose   Date Value Ref Range Status   03/05/2020 95 65 - 99 mg/dL Final     Sodium   Date Value Ref Range Status   03/05/2020 137 136 - 145 mmol/L Final     Potassium   Date Value Ref Range Status   03/05/2020 4.0 3.5 - 5.2 mmol/L Final     CO2   Date Value Ref Range Status   03/05/2020 22.0 22.0 - 29.0 mmol/L Final     Chloride   Date Value Ref Range Status   03/05/2020 101 98 - 107 mmol/L Final     Anion Gap   Date Value Ref Range Status   03/05/2020 14.0 5.0 - 15.0 mmol/L Final     Creatinine   Date Value Ref Range  Status   03/05/2020 0.52 (L) 0.57 - 1.00 mg/dL Final     BUN   Date Value Ref Range Status   03/05/2020 5 (L) 6 - 20 mg/dL Final     BUN/Creatinine Ratio   Date Value Ref Range Status   03/05/2020 9.6 7.0 - 25.0 Final     Calcium   Date Value Ref Range Status   03/05/2020 8.9 8.6 - 10.5 mg/dL Final     eGFR Non  Amer   Date Value Ref Range Status   03/05/2020 122 >60 mL/min/1.73 Final     Alkaline Phosphatase   Date Value Ref Range Status   03/03/2020 137 (H) 39 - 117 U/L Final     Total Protein   Date Value Ref Range Status   03/03/2020 4.9 (L) 6.0 - 8.5 g/dL Final     ALT (SGPT)   Date Value Ref Range Status   03/03/2020 62 (H) 1 - 33 U/L Final     AST (SGOT)   Date Value Ref Range Status   03/03/2020 81 (H) 1 - 32 U/L Final     Total Bilirubin   Date Value Ref Range Status   03/03/2020 0.9 0.2 - 1.2 mg/dL Final     Albumin   Date Value Ref Range Status   03/03/2020 2.90 (L) 3.50 - 5.20 g/dL Final     Globulin   Date Value Ref Range Status   03/03/2020 2.0 gm/dL Final     Magnesium   Date Value Ref Range Status   03/04/2020 2.1 1.6 - 2.6 mg/dL Final     Phosphorus   Date Value Ref Range Status   03/04/2020 3.5 2.5 - 4.5 mg/dL Final     Lab Results   Component Value Date    HGBA1C 12.9 (H) 03/02/2020     No results found for: GLUF, MICROALBUR  Results from last 7 days   Lab Units 03/05/20  1227 03/05/20  1115 03/05/20  0717 03/05/20  0441 03/04/20  2315 03/04/20  2259   GLUCOSE mg/dL 101 54* 73 72 73 67*             Medication Review: Reviewed.       budesonide-formoterol 2 puff Inhalation BID - RT   clindamycin 300 mg Oral Q8H   enoxaparin 40 mg Subcutaneous Q24H   folic acid 1 mg Oral Daily   gabapentin 100 mg Oral Q8H   insulin glargine 10 Units Subcutaneous QAM   insulin lispro 0-7 Units Subcutaneous TID With Meals   insulin lispro 3 Units Subcutaneous TID With Meals   [START ON 3/6/2020] levoFLOXacin 750 mg Oral Q24H   levothyroxine 25 mcg Oral Q AM   nicotine 1 patch Transdermal Nightly   sodium  chloride 1,000 mL Intravenous Once   sodium chloride 10 mL Intravenous Q12H   sodium chloride 10 mL Intravenous Q12H   THERA 1 tablet Oral Daily   thiamine 100 mg Oral Daily         Assessment/Plan   1.  Diabetes mellitus type 1: Uncontrolled but blood sugars are improving  2.  Hypothyroidism: On levothyroxine supplementation  3.  Severe hypokalemia: Replaced  4.  Severe hypophosphatemia: Replaced  5.  Malnutrition  6.  Lupus.     Plan:  Blood sugar continues to run low, will decrease Lantus to 8 units subcu nightly continue Humalog 3 units with each meal and continue to follow blood sugars and make further adjustments as needed.            José Miguel Regalado MD. FACE                Electronically signed by José Miguel Regalado MD at 03/05/20 1414     Esme Trammell MD at 03/05/20 1325                [facility] Medicine Services Daily Progress Note      Hospitalist Team  LOS 3 days      Patient Care Team:  Provider, No Known as PCP - General    Patient Location: 226/1      Subjective   Subjective     Chief Complaint / Subjective  Chief Complaint   Patient presents with   • Weakness - Generalized         Brief Synopsis of Hospital Course/HPI      The patient is a 54 y.o. female with past medical history of lupus, alcohol and tobacco abuse who presented to [facility] ED on 3/1/2020 complaining of 9 days of nausea, vomiting, diarrhea and weakness.    The patient has not seen a physician for 3 years and is originally from [place].  She admits to drinking alcohol daily.  The patient has been falling at home because of weakness and denies focal weakness or dizziness before falls.    Date::    3/2/20: Patient received IV fluid boluses for low BP overnight.  Has not seen a physician for 3 years. Originally from [place].  History of lupus.  Not on medications at home.  Afebrile.  Claims to have several days of diarrhea and started on azithromycin for E. coli positive stool.  Endocrinology consulted.  3/3/20:  "Complained of right facial edema pain and oral pain.  Changed antibiotics to Levaquin and Flagyl.  DC azithromycin.  Blood glucose better controlled.  3/4/20: CT facial bone shows dental abscess and caries.  Afebrile.      Review of Systems   All other systems reviewed and are negative.        Objective   Objective      Vital Signs  Temp:  [97.9 °F (36.6 °C)-98.4 °F (36.9 °C)] 97.9 °F (36.6 °C)  Heart Rate:  [] 103  Resp:  [15-16] 16  BP: (102-118)/(69-87) 105/73  Oxygen Therapy  SpO2: 100 %  Pulse Oximetry Type: Intermittent  Device (Oxygen Therapy): room air  Flow (L/min): 2  Flowsheet Rows      First Filed Value   Admission Height  157.5 cm (62\") Documented at 03/01/2020 1345   Admission Weight  47.8 kg (105 lb 6.1 oz) Documented at 03/01/2020 1345        Intake & Output (last 3 days)       03/02 0701 - 03/03 0700 03/03 0701 - 03/04 0700 03/04 0701 - 03/05 0700 03/05 0701 - 03/06 0700    P.O. 6194 514 0017 240    I.V. (mL/kg) 100 (1.9)       IV Piggyback  100 100 100    Total Intake(mL/kg) 1200 (22.2) 600 (10.7) 1780 (32.9) 340 (6.3)    Urine (mL/kg/hr) 450 (0.3)  240 (0.2)     Stool 0  0     Total Output 450  240     Net +750 +600 +1540 +340            Urine Unmeasured Occurrence 4 x 1 x 7 x     Stool Unmeasured Occurrence 5 x 3 x 4 x         Lines, Drains & Airways    Active LDAs     Name:   Placement date:   Placement time:   Site:   Days:    Peripheral IV 03/01/20 1429 Left Antecubital   03/01/20    1429    Antecubital   1    Peripheral IV 03/02/20 1200 Left;Posterior Hand   03/02/20    1200    Hand   less than 1                  Physical Exam:    Physical Exam   Constitutional: She is oriented to person, place, and time. She appears well-developed.   HENT:   Head: Normocephalic.   Mouth/Throat: Mucous membranes are dry.   Eyes: Pupils are equal, round, and reactive to light.   Neck: Trachea normal and full passive range of motion without pain.   Cardiovascular: Normal rate and regular rhythm.   "   Pulmonary/Chest: Effort normal and breath sounds normal.   Musculoskeletal:   Moves all extremities.   Lymphadenopathy:     She has no cervical adenopathy.   Neurological: She is alert and oriented to person, place, and time. No cranial nerve deficit.   Skin: Skin is warm and dry.   Psychiatric: She has a normal mood and affect. Her speech is normal and behavior is normal. Cognition and memory are normal.     Procedures: None      Results Review:     I reviewed the patient's new clinical results.      Lab Results (last 24 hours)     Procedure Component Value Units Date/Time    Glutamic Acid Decarboxylase [[id number]] Collected:  03/03/20 0249    Specimen:  Blood Updated:  03/05/20 1309     BRAD-65 <5.0 U/mL     Narrative:       Performed at:  [address] [place] [address]  [id number]  : Mikayla Rodriguez MD, Phone:  [phone]    Basic Metabolic Panel [[id number]]  (Abnormal) Collected:  03/05/20 1200    Specimen:  Blood Updated:  03/05/20 1254     Glucose 95 mg/dL      BUN 5 mg/dL      Creatinine 0.52 mg/dL      Sodium 137 mmol/L      Potassium 4.0 mmol/L      Chloride 101 mmol/L      CO2 22.0 mmol/L      Calcium 8.9 mg/dL      eGFR Non African Amer 122 mL/min/1.73      BUN/Creatinine Ratio 9.6     Anion Gap 14.0 mmol/L     Narrative:       GFR Normal >60  Chronic Kidney Disease <60  Kidney Failure <15      POC Glucose Once [[id number]]  (Normal) Collected:  03/05/20 1227    Specimen:  Blood Updated:  03/05/20 1238     Glucose 101 mg/dL      Comment: Serial Number: [id number]:  [id number]       POC Glucose Once [[id number]]  (Abnormal) Collected:  03/05/20 1115    Specimen:  Blood Updated:  03/05/20 1122     Glucose 54 mg/dL      Comment: Serial Number: [id number]:  [id number]       POC Glucose Once [[id number]]  (Normal) Collected:  03/05/20 0717    Specimen:  Blood Updated:  03/05/20 0718     Glucose 73 mg/dL      Comment: Serial Number: [id number]:  [id number]        Basic Metabolic Panel [[id number]]  (Abnormal) Collected:  03/05/20 0402    Specimen:  Blood Updated:  03/05/20 0506     Glucose 84 mg/dL      BUN 4 mg/dL      Creatinine 0.34 mg/dL      Sodium 134 mmol/L      Potassium 3.9 mmol/L      Chloride 102 mmol/L      CO2 20.0 mmol/L      Calcium 8.3 mg/dL      eGFR Non African Amer >150 mL/min/1.73      BUN/Creatinine Ratio 11.8     Anion Gap 12.0 mmol/L     Narrative:       GFR Normal >60  Chronic Kidney Disease <60  Kidney Failure <15      POC Glucose Once [[id number]]  (Normal) Collected:  03/05/20 0441    Specimen:  Blood Updated:  03/05/20 0442     Glucose 72 mg/dL      Comment: Serial Number: [id number]:  [id number]       Basic Metabolic Panel [[id number]]  (Abnormal) Collected:  03/05/20 0018    Specimen:  Blood Updated:  03/05/20 0104     Glucose 117 mg/dL      BUN 4 mg/dL      Creatinine 0.38 mg/dL      Sodium 133 mmol/L      Potassium 4.1 mmol/L      Chloride 101 mmol/L      CO2 22.0 mmol/L      Calcium 8.2 mg/dL      eGFR Non African Amer >150 mL/min/1.73      BUN/Creatinine Ratio 10.5     Anion Gap 10.0 mmol/L     Narrative:       GFR Normal >60  Chronic Kidney Disease <60  Kidney Failure <15      POC Glucose Once [[id number]]  (Normal) Collected:  03/04/20 2315    Specimen:  Blood Updated:  03/04/20 2316     Glucose 73 mg/dL      Comment: Serial Number: [id number]:  [id number]       POC Glucose Once [[id number]]  (Abnormal) Collected:  03/04/20 2259    Specimen:  Blood Updated:  03/04/20 2300     Glucose 67 mg/dL      Comment: Serial Number: [id number]:  [id number]       POC Glucose Once [[id number]]  (Normal) Collected:  03/04/20 1941    Specimen:  Blood Updated:  03/04/20 1942     Glucose 78 mg/dL      Comment: Serial Number: [id number]:  [id number]       Basic Metabolic Panel [[id number]]  (Abnormal) Collected:  03/04/20 1846    Specimen:  Blood Updated:  03/04/20 1919     Glucose 87 mg/dL      BUN 5 mg/dL      Creatinine 0.39 mg/dL       Sodium 133 mmol/L      Potassium 4.2 mmol/L      Chloride 101 mmol/L      CO2 23.0 mmol/L      Calcium 8.4 mg/dL      eGFR Non African Amer >150 mL/min/1.73      BUN/Creatinine Ratio 12.8     Anion Gap 9.0 mmol/L     Narrative:       GFR Normal >60  Chronic Kidney Disease <60  Kidney Failure <15      POC Glucose Once [[id number]]  (Normal) Collected:  03/04/20 1636    Specimen:  Blood Updated:  03/04/20 1654     Glucose 82 mg/dL      Comment: Serial Number: [id number]:  [id number]       C-Peptide [[id number]]  (Abnormal) Collected:  03/03/20 0249    Specimen:  Blood Updated:  03/04/20 1509     C-Peptide 0.5 ng/mL      Comment: C-Peptide reference interval is for fasting patients.       Narrative:       Performed at:  [address] [place] [address]  [id number]  : Maged Johnston PhD, Phone:  [phone]    Basic Metabolic Panel [[id number]]  (Abnormal) Collected:  03/04/20 1315    Specimen:  Blood Updated:  03/04/20 1432     Glucose 103 mg/dL      BUN 4 mg/dL      Creatinine 0.34 mg/dL      Sodium 131 mmol/L      Potassium 4.0 mmol/L      Chloride 98 mmol/L      CO2 22.0 mmol/L      Calcium 8.4 mg/dL      eGFR Non African Amer >150 mL/min/1.73      BUN/Creatinine Ratio 11.8     Anion Gap 11.0 mmol/L     Narrative:       GFR Normal >60  Chronic Kidney Disease <60  Kidney Failure <15      Phosphorus [[id number]]  (Normal) Collected:  03/04/20 1315    Specimen:  Blood Updated:  03/04/20 1432     Phosphorus 3.5 mg/dL     Magnesium [[id number]]  (Normal) Collected:  03/04/20 1315    Specimen:  Blood Updated:  03/04/20 1432     Magnesium 2.1 mg/dL         No results found for: HGBA1C  Results from last 7 days   Lab Units 03/01/20  1432   INR  0.98           Lab Results   Component Value Date    LIPASE 39 03/01/2020     Lab Results   Component Value Date    CHOL 98 03/02/2020    TRIG 163 (H) 03/02/2020    HDL 38 (L) 03/02/2020    LDL 27 03/02/2020       No results found for: INTRAOP, PREDX,  FINALDX, COMDX    Microbiology Results (last 10 days)     Procedure Component Value - Date/Time    Gastrointestinal Panel, PCR - Stool, Per Rectum [[id number]]  (Abnormal) Collected:  03/02/20 0440    Lab Status:  Final result Specimen:  Stool from Per Rectum Updated:  03/02/20 0841     Campylobacter Not Detected     Plesiomonas shigelloides Not Detected     Salmonella Not Detected     Vibrio Not Detected     Vibrio cholerae Not Detected     Yersinia enterocolitica Not Detected     Enteroaggregative E. coli (EAEC) Detected     Enteropathogenic E. coli (EPEC) Not Detected     Enterotoxigenic E. coli (ETEC) lt/st Not Detected     Shiga-like toxin-producing E. coli (STEC) stx1/stx2 Not Detected     E. coli O157 Not Detected     Shigella/Enteroinvasive E. coli (EIEC) Not Detected     Cryptosporidium Not Detected     Cyclospora cayetanensis Not Detected     Entamoeba histolytica Not Detected     Giardia lamblia Not Detected     Adenovirus F40/41 Not Detected     Astrovirus Not Detected     Norovirus GI/GII Not Detected     Rotavirus A Not Detected     Sapovirus (I, II, IV or V) Not Detected    Narrative:       If Aeromonas, Staphylococcus aureus or Bacillus cereus are suspected, please order [id number]: Stool Culture, Aeromonas, S aureus, B Cereus.    Respiratory Panel, PCR - Swab, Nasopharynx [[id number]]  (Normal) Collected:  03/02/20 0434    Lab Status:  Final result Specimen:  Swab from Nasopharynx Updated:  03/02/20 0629     ADENOVIRUS, PCR Not Detected     Coronavirus 229E Not Detected     Coronavirus HKU1 Not Detected     Coronavirus NL63 Not Detected     Coronavirus OC43 Not Detected     Human Metapneumovirus Not Detected     Human Rhinovirus/Enterovirus Not Detected     Influenza B PCR Not Detected     Parainfluenza Virus 1 Not Detected     Parainfluenza Virus 2 Not Detected     Parainfluenza Virus 3 Not Detected     Parainfluenza Virus 4 Not Detected     Bordetella pertussis pcr Not Detected     Influenza A H1 2009  PCR Not Detected     Chlamydophila pneumoniae PCR Not Detected     Mycoplasma pneumo by PCR Not Detected     Influenza A PCR Not Detected     Influenza A H3 Not Detected     Influenza A H1 Not Detected     RSV, PCR Not Detected    Urine Culture - Urine, Urine, Clean Catch [[id number]] Collected:  03/01/20 1530    Lab Status:  Final result Specimen:  Urine, Clean Catch Updated:  03/02/20 1634     Urine Culture >100,000 CFU/mL Mixed Emre Isolated    Narrative:       Specimen contains mixed organisms of questionable pathogenicity which indicates contamination with commensal emre.  Further identification is unlikely to provide clinically useful information.  Suggest recollection.          ECG/EMG Results (most recent)     Procedure Component Value Units Date/Time    ECG 12 Lead [[id number]]  (Abnormal) Resulted:  03/01/20 1624     Updated:  03/01/20 1624    ECG 12 Lead [[id number]] Collected:  03/02/20 0301     Updated:  03/02/20 0302    Narrative:       HEART RATE= 94  bpm  RR Interval= 636  ms  MT Interval= 130  ms  P Horizontal Axis= -10  deg  P Front Axis= 77  deg  QRSD Interval= 85  ms  QT Interval= 431  ms  QRS Axis= 82  deg  T Wave Axis= 265  deg  - ABNORMAL ECG -  Sinus rhythm  Repol abnrm suggests ischemia, diffuse leads  Prolonged QT interval  Electronically Signed By:   Date and Time of Study: 2020-03-02 03:01:10    ECG 12 Lead [[id number]] Collected:  03/01/20 1405     Updated:  03/02/20 0726    Narrative:       HEART RATE= 93  bpm  RR Interval= 644  ms  MT Interval= 136  ms  P Horizontal Axis= -4  deg  P Front Axis= 68  deg  QRSD Interval= 84  ms  QT Interval= 393  ms  QRS Axis= 75  deg  T Wave Axis= 262  deg  - ABNORMAL ECG -  Sinus rhythm  LAE, consider biatrial enlargement  Repol abnrm suggests ischemia, diffuse leads  Electronically Signed By: Berlin Smith (David) 02-Mar-2020 07:26:06  Date and Time of Study: 2020-03-01 14:05:28                    Us Gallbladder    Result Date: 3/2/2020  1.  Cholecystectomy. Normal caliber of the biliary tree. 2. Severe hepatic steatosis. 3. The right kidney shows no evidence of shadowing stone or hydronephrosis. Electronically signed by:  Mike Valdes M.D.  3/2/2020 1:11 AM    Xr Chest 1 View    Result Date: 3/2/2020  Impression: 1. Prominence of bronchopulmonary markings which can be seen in viral airway disease.  Please correlate for bronchitis. 2. No focal infiltrate. Electronically signed by:  Marsha Omalley M.D.  3/2/2020 1:47 AM    Ct Facial Bones With Contrast    Result Date: 3/4/2020   Multiple dental caries and periapical lucencies consistent with dental abscesses. Dental x-rays would be more useful at outpatient follow-up.  Electronically Signed By-Josef Matson On:3/4/2020 2:43 PM This report was finalized on [id number] by  Josef Matson, .          Xrays, labs reviewed personally by physician.    Medication Review:   I have reviewed the patient's current medication list      Scheduled Meds    enoxaparin 40 mg Subcutaneous Q24H   folic acid 1 mg Oral Daily   insulin glargine 10 Units Subcutaneous QAM   insulin lispro 0-7 Units Subcutaneous TID With Meals   insulin lispro 3 Units Subcutaneous TID With Meals   levoFLOXacin 750 mg Intravenous Q24H   levothyroxine 25 mcg Oral Q AM   metroNIDAZOLE 500 mg Intravenous Q8H   nicotine 1 patch Transdermal Nightly   sodium chloride 1,000 mL Intravenous Once   sodium chloride 10 mL Intravenous Q12H   sodium chloride 10 mL Intravenous Q12H   THERA 1 tablet Oral Daily   thiamine 100 mg Oral Daily       Meds Infusions       Meds PRN  •  acetaminophen  •  calcium carbonate  •  Calcium Gluconate-NaCl **AND** calcium gluconate **AND** Calcium, Ionized  •  dextrose  •  dextrose  •  docusate sodium  •  glucagon (human recombinant)  •  ipratropium-albuterol  •  loperamide  •  LORazepam **OR** LORazepam **OR** LORazepam **OR** LORazepam **OR** LORazepam **OR** LORazepam  •  magnesium sulfate **OR** magnesium sulfate **OR**  magnesium sulfate  •  melatonin  •  nitroglycerin  •  ondansetron **OR** ondansetron  •  oxyCODONE  •  potassium & sodium phosphates **OR** potassium & sodium phosphates  •  potassium chloride  •  potassium chloride  •  potassium chloride  •  [COMPLETED] Insert peripheral IV **AND** sodium chloride  •  sodium chloride  •  sodium chloride      Assessment/Plan   Assessment/Plan     Active Hospital Problems    Diagnosis  POA   • **Hyperglycemia [R73.9]  Yes   • Hypophosphatemia [E83.39]  Yes   • Starvation ketoacidosis [E87.2]  Yes   • Type 2 diabetes mellitus (CMS/HCC) [E11.9]  Yes   • Hypokalemia, inadequate intake [E87.6]  Yes   • Tobacco abuse [Z72.0]  Unknown   • Alcoholism /alcohol abuse (CMS/HCC) [F10.20]  Yes   • Hyponatremia [E87.1]  Yes   • Transaminitis [R74.0]  Yes   • Insomnia [G47.00]  Yes      Resolved Hospital Problems   No resolved problems to display.       MEDICAL DECISION MAKING COMPLEXITY BY PROBLEM:     1.  New onset diabetes mellitus with hypoglycemia  -Hemoglobin A1c 12.9  -Continue Lantus, mealtime insulin and ISS   -Endocrinology and diabetic nurse educator consulted     2.  Infectious diarrhea:  ; E.coli( EAEC)  ; Levaquin and Flagyl  ; still having diarrhea - peptobismol prn          3.  Right facial cellulitis:  -With dental caries/abscess  -Onset after admission  -PCN allergy therefore Levaquin and Flagyl for now  -s/p CT facial bone     4.  New hypothyroidism:  -Started on Synthroid by endocrinology    5. Hypokalemia and hypophosphatemia:  -Replacement protocol ordered  -Consult nutrition for calorie count  - resolved     6.  Alcohol abuse  -CIWA protocol ordered  -Case management consult placed  - applied neurontin     7.  Tobacco abuse  -Nicotine patch ordered    8.  Lupus:  -Has not been on Plaquenil for 3 years    9. COPD without exacerbation  ; duoneb, apply steroid inhaler     10. Acute deconditioning  - still very frail , walking with PT but very SOB on exertion due to  COPD      VTE Prophylaxis -   Mechanical Order History:     None      Pharmalogical Order History:     Ordered     Dose Route Frequency Stop    03/02/20 0821  enoxaparin (LOVENOX) syringe 40 mg      40 mg SC Every 24 Hours --    03/01/20 2030  enoxaparin (LOVENOX) syringe 30 mg  Status:  Discontinued      30 mg SC Daily 03/02/20 0835            Code Status -   Code Status and Medical Interventions:   Ordered at: 03/01/20 1654     Level Of Support Discussed With:    Patient     Code Status:    CPR     Medical Interventions (Level of Support Prior to Arrest):    Full       Discharge Planning DC to rehab when bed available.          Destination      Coordination has not been started for this encounter.      Durable Medical Equipment      Coordination has not been started for this encounter.      Dialysis/Infusion      Coordination has not been started for this encounter.      Home Medical Care      Coordination has not been started for this encounter.      Therapy      Coordination has not been started for this encounter.      Community Resources      Coordination has not been started for this encounter.            Electronically signed by Esme Stanton MD, 03/05/20, 1:29 PM.  [facility] [place] [facility]        Electronically signed by Esme Trammell MD at 03/05/20 1345       Consult Notes (last 24 hours) (Notes from 03/05/20 1002 through 03/06/20 1002)    No notes of this type exist for this encounter.

## 2020-03-06 NOTE — THERAPY TREATMENT NOTE
Acute Care - Occupational Therapy Treatment Note  [facility]     Patient Name: Smita Steele  : 1965  MRN: [id number]  Today's Date: 3/6/2020             Admit Date: 3/1/2020       ICD-10-CM ICD-9-CM   1. Hypokalemia, inadequate intake E87.6 276.8   2. Non-intractable vomiting with nausea, unspecified vomiting type R11.2 787.01   3. Diarrhea, unspecified type R19.7 787.91   4. Dehydration E86.0 276.51   5. Hyperglycemia R73.9 790.29   6. Oral thrush B37.0 112.0     Patient Active Problem List   Diagnosis   • Hypokalemia, inadequate intake   • Hyperglycemia   • Tobacco abuse   • Alcoholism /alcohol abuse (CMS/HCC)   • Hyponatremia   • Transaminitis   • Insomnia   • Hypophosphatemia   • Starvation ketoacidosis   • Type 2 diabetes mellitus (CMS/HCC)     Past Medical History:   Diagnosis Date   • Lupus (CMS/HCC)      Past Surgical History:   Procedure Laterality Date   •  SECTION     • CHOLECYSTECTOMY     • HYSTERECTOMY         Therapy Treatment    Rehabilitation Treatment Summary     Row Name 20 1700             Treatment Time/Intention    Discipline  occupational therapist  -MP      Document Type  therapy note (daily note)  -MP      Subjective Information  complains of;weakness  -MP      Mode of Treatment  occupational therapy  -MP      Recorded by [MP] Kody Godinez OT 20 1720      Row Name 20 170             Motor Skills Assessment/Interventions    Additional Documentation  Therapeutic Exercise (Group);Therapeutic Exercise Interventions (Group)  -MP      Recorded by [MP] Kody Godinez OT 20 1720      Row Name 20 1700             Therapeutic Exercise    Upper Extremity (Therapeutic Exercise)  bicep curl, bilateral;tricep extension, bilateral;reverse fly away  -MP      Weight/Resistance (Therapeutic Exercise)  red;yellow  -MP      Exercise Type (Therapeutic Exercise)  resistive exercises  -MP      Position (Therapeutic Exercise)  seated  -MP      Sets/Reps  (Therapeutic Exercise)  1/15  -MP      Equipment (Therapeutic Exercise)  resistive bands  -MP      Expected Outcome (Therapeutic Exercise)  improve performance, transfer skills  -MP      Recorded by [MP] Kody Godinez OT 03/06/20 1720      Row Name 03/06/20 1700             Positioning and Restraints    Pre-Treatment Position  in bed  -MP      Post Treatment Position  bed  -MP      In Bed  call light within reach;encouraged to call for assist;exit alarm on  -MP      Recorded by [MP] Kody Godinez OT 03/06/20 1720      Row Name 03/06/20 1700             Pain Scale: Numbers Pre/Post-Treatment    Pain Scale: Numbers, Pretreatment  0/10 - no pain  -MP      Pain Scale: Numbers, Post-Treatment  0/10 - no pain  -MP      Recorded by [MP] Kody Godinez OT 03/06/20 1720      Row Name 03/06/20 1700             Outcome Summary/Treatment Plan (OT)    Daily Summary of Progress (OT)  progress toward functional goals is good  -MP      Anticipated Discharge Disposition (OT)  inpatient rehabilitation facility  -MP      Recorded by [MP] Kody Godinez OT 03/06/20 1720        User Key  (r) = Recorded By, (t) = Taken By, (c) = Cosigned By    Initials Name Effective Dates Discipline    MP Kody Godinez OT 03/01/19 -  OT                 OT Recommendation and Plan  Outcome Summary/Treatment Plan (OT)  Daily Summary of Progress (OT): progress toward functional goals is good  Anticipated Discharge Disposition (OT): inpatient rehabilitation facility  Therapy Frequency (OT Eval): 3 times/wk  Daily Summary of Progress (OT): progress toward functional goals is good  Plan of Care Review  Plan of Care Reviewed With: patient  Plan of Care Reviewed With: patient  Outcome Summary: Pt. progresses w/ BUE HEP this date 10-15 reps x 1 set theraband exercise, albeit progress limited secondary to continued global weakness BUE 3+/5. Recommend IP rehab at d/c to advance transfer skills and ADL independence prior to d/c home.        Time Calculation:   Time Calculation- OT     Row Name 03/06/20 1722 03/06/20 1343          Time Calculation- OT    OT Start Time  1355  -MP  --     OT Stop Time  1410  -MP  --     OT Time Calculation (min)  15 min  -MP  --     Total Timed Code Minutes- OT  15 minute(s)  -MP  --     OT Received On  03/06/20  -  --     OT - Next Appointment  03/09/20  -  --        Timed Charges    48734 - Gait Training Minutes   --  --  (Pended)   -BM       User Key  (r) = Recorded By, (t) = Taken By, (c) = Cosigned By    Initials Name Provider Type    Kody Perry OT Occupational Therapist    BM Luisa Richey PTA Physical Therapy Assistant        Therapy Charges for Today     Code Description Service Date Service Provider Modifiers Qty    [id number] HC OT THER PROC EA 15 MIN 3/6/2020 Kody Godinez OT GO 1               Kody Godinez OT  3/6/2020

## 2020-03-06 NOTE — PLAN OF CARE
Problem: Patient Care Overview  Goal: Plan of Care Review  Outcome: Ongoing (interventions implemented as appropriate)  Flowsheets (Taken 3/6/2020 2214)  Progress: improving  Plan of Care Reviewed With: patient  Outcome Summary: pt complains of face pain related to tooth abcsess, tolerated meds well, no diarrhea on this shift

## 2020-03-07 VITALS
HEIGHT: 62 IN | SYSTOLIC BLOOD PRESSURE: 119 MMHG | OXYGEN SATURATION: 94 % | WEIGHT: 118.6 LBS | HEART RATE: 116 BPM | RESPIRATION RATE: 17 BRPM | BODY MASS INDEX: 21.83 KG/M2 | TEMPERATURE: 97.8 F | DIASTOLIC BLOOD PRESSURE: 77 MMHG

## 2020-03-07 LAB
ANION GAP SERPL CALCULATED.3IONS-SCNC: 7 MMOL/L (ref 5–15)
ANION GAP SERPL CALCULATED.3IONS-SCNC: 8 MMOL/L (ref 5–15)
BUN BLD-MCNC: 7 MG/DL (ref 6–20)
BUN BLD-MCNC: 9 MG/DL (ref 6–20)
BUN/CREAT SERPL: 14.3 (ref 7–25)
BUN/CREAT SERPL: 18 (ref 7–25)
CALCIUM SPEC-SCNC: 8.6 MG/DL (ref 8.6–10.5)
CALCIUM SPEC-SCNC: 8.6 MG/DL (ref 8.6–10.5)
CHLORIDE SERPL-SCNC: 102 MMOL/L (ref 98–107)
CHLORIDE SERPL-SCNC: 103 MMOL/L (ref 98–107)
CO2 SERPL-SCNC: 24 MMOL/L (ref 22–29)
CO2 SERPL-SCNC: 25 MMOL/L (ref 22–29)
CREAT BLD-MCNC: 0.49 MG/DL (ref 0.57–1)
CREAT BLD-MCNC: 0.5 MG/DL (ref 0.57–1)
GFR SERPL CREATININE-BSD FRML MDRD: 128 ML/MIN/1.73
GFR SERPL CREATININE-BSD FRML MDRD: 131 ML/MIN/1.73
GLUCOSE BLD-MCNC: 370 MG/DL (ref 65–99)
GLUCOSE BLD-MCNC: 380 MG/DL (ref 65–99)
GLUCOSE BLDC GLUCOMTR-MCNC: 296 MG/DL (ref 70–105)
GLUCOSE BLDC GLUCOMTR-MCNC: 382 MG/DL (ref 70–105)
POTASSIUM BLD-SCNC: 4.6 MMOL/L (ref 3.5–5.2)
POTASSIUM BLD-SCNC: 4.8 MMOL/L (ref 3.5–5.2)
SODIUM BLD-SCNC: 134 MMOL/L (ref 136–145)
SODIUM BLD-SCNC: 135 MMOL/L (ref 136–145)

## 2020-03-07 PROCEDURE — 94799 UNLISTED PULMONARY SVC/PX: CPT

## 2020-03-07 PROCEDURE — 63710000001 INSULIN GLARGINE PER 5 UNITS: Performed by: INTERNAL MEDICINE

## 2020-03-07 PROCEDURE — 99239 HOSP IP/OBS DSCHRG MGMT >30: CPT | Performed by: INTERNAL MEDICINE

## 2020-03-07 PROCEDURE — 82962 GLUCOSE BLOOD TEST: CPT

## 2020-03-07 PROCEDURE — 63710000001 INSULIN LISPRO (HUMAN) PER 5 UNITS: Performed by: INTERNAL MEDICINE

## 2020-03-07 PROCEDURE — 80048 BASIC METABOLIC PNL TOTAL CA: CPT | Performed by: HOSPITALIST

## 2020-03-07 RX ORDER — LANCETS
1 EACH MISCELLANEOUS
Qty: 204 EACH | Refills: 2 | Status: ON HOLD | OUTPATIENT
Start: 2020-03-07 | End: 2020-07-26

## 2020-03-07 RX ORDER — MULTIVITAMIN,THERAPEUTIC
1 TABLET ORAL DAILY
Qty: 30 TABLET | Refills: 0 | Status: SHIPPED | OUTPATIENT
Start: 2020-03-08 | End: 2020-10-27

## 2020-03-07 RX ORDER — LEVOTHYROXINE SODIUM 0.03 MG/1
25 TABLET ORAL DAILY
Qty: 30 TABLET | Refills: 0 | Status: SHIPPED | OUTPATIENT
Start: 2020-03-07

## 2020-03-07 RX ORDER — PEN NEEDLE, DIABETIC 30 GX3/16"
1 NEEDLE, DISPOSABLE MISCELLANEOUS
Qty: 200 EACH | Refills: 2 | Status: ON HOLD | OUTPATIENT
Start: 2020-03-07 | End: 2020-07-26

## 2020-03-07 RX ORDER — ALBUTEROL SULFATE 90 UG/1
2 AEROSOL, METERED RESPIRATORY (INHALATION) EVERY 4 HOURS PRN
Qty: 2 INHALER | Refills: 0 | Status: SHIPPED | OUTPATIENT
Start: 2020-03-07 | End: 2020-03-07 | Stop reason: SDUPTHER

## 2020-03-07 RX ORDER — CLINDAMYCIN HYDROCHLORIDE 300 MG/1
300 CAPSULE ORAL EVERY 8 HOURS SCHEDULED
Qty: 9 CAPSULE | Refills: 0 | Status: SHIPPED | OUTPATIENT
Start: 2020-03-07 | End: 2020-03-07

## 2020-03-07 RX ORDER — CHLORPHENIR/PHENYLEPH/ASPIRIN 2-7.8-325
1 TABLET, EFFERVESCENT ORAL AS NEEDED
Qty: 50 STRIP | Refills: 2 | Status: SHIPPED | OUTPATIENT
Start: 2020-03-07 | End: 2020-03-07 | Stop reason: SDUPTHER

## 2020-03-07 RX ORDER — LEVOTHYROXINE SODIUM 0.03 MG/1
25 TABLET ORAL DAILY
Qty: 30 TABLET | Refills: 0 | Status: SHIPPED | OUTPATIENT
Start: 2020-03-07 | End: 2020-03-07

## 2020-03-07 RX ORDER — INSULIN LISPRO 100 [IU]/ML
6 INJECTION, SOLUTION INTRAVENOUS; SUBCUTANEOUS
Qty: 5.4 ML | Refills: 0 | Status: SHIPPED | OUTPATIENT
Start: 2020-03-07 | End: 2020-03-07 | Stop reason: SDUPTHER

## 2020-03-07 RX ORDER — INSULIN LISPRO 100 [IU]/ML
6 INJECTION, SOLUTION INTRAVENOUS; SUBCUTANEOUS
Qty: 5.4 ML | Refills: 0 | OUTPATIENT
Start: 2020-03-07 | End: 2020-10-28 | Stop reason: HOSPADM

## 2020-03-07 RX ORDER — MULTIVITAMIN,THERAPEUTIC
1 TABLET ORAL DAILY
Qty: 30 TABLET | Refills: 0 | Status: SHIPPED | OUTPATIENT
Start: 2020-03-08 | End: 2020-03-07

## 2020-03-07 RX ORDER — SACCHAROMYCES BOULARDII 250 MG
250 CAPSULE ORAL 2 TIMES DAILY
Qty: 6 CAPSULE | Refills: 0 | Status: SHIPPED | OUTPATIENT
Start: 2020-03-07 | End: 2020-03-10

## 2020-03-07 RX ORDER — BISMUTH SUBSALICYLATE 262 MG/1
524 TABLET, CHEWABLE ORAL 4 TIMES DAILY
Qty: 10 TABLET | Refills: 0 | Status: SHIPPED | OUTPATIENT
Start: 2020-03-07 | End: 2020-03-07

## 2020-03-07 RX ORDER — BISMUTH SUBSALICYLATE 262 MG/1
524 TABLET, CHEWABLE ORAL 4 TIMES DAILY
Qty: 10 TABLET | Refills: 0 | Status: SHIPPED | OUTPATIENT
Start: 2020-03-07 | End: 2020-03-09

## 2020-03-07 RX ORDER — ALBUTEROL SULFATE 90 UG/1
2 AEROSOL, METERED RESPIRATORY (INHALATION) EVERY 4 HOURS PRN
Qty: 2 INHALER | Refills: 0 | Status: SHIPPED | OUTPATIENT
Start: 2020-03-07

## 2020-03-07 RX ORDER — BUDESONIDE AND FORMOTEROL FUMARATE DIHYDRATE 160; 4.5 UG/1; UG/1
2 AEROSOL RESPIRATORY (INHALATION)
Qty: 2 INHALER | Refills: 0 | Status: SHIPPED | OUTPATIENT
Start: 2020-03-07 | End: 2020-10-27

## 2020-03-07 RX ORDER — GABAPENTIN 100 MG/1
100 CAPSULE ORAL 2 TIMES DAILY
Qty: 60 CAPSULE | Refills: 0 | Status: SHIPPED | OUTPATIENT
Start: 2020-03-07 | End: 2020-03-07 | Stop reason: HOSPADM

## 2020-03-07 RX ORDER — BUDESONIDE AND FORMOTEROL FUMARATE DIHYDRATE 160; 4.5 UG/1; UG/1
2 AEROSOL RESPIRATORY (INHALATION)
Qty: 2 INHALER | Refills: 0 | Status: SHIPPED | OUTPATIENT
Start: 2020-03-07 | End: 2020-03-07

## 2020-03-07 RX ORDER — CLINDAMYCIN HYDROCHLORIDE 300 MG/1
300 CAPSULE ORAL EVERY 8 HOURS SCHEDULED
Qty: 6 CAPSULE | Refills: 0 | Status: SHIPPED | OUTPATIENT
Start: 2020-03-07 | End: 2020-03-09

## 2020-03-07 RX ORDER — PEN NEEDLE, DIABETIC 30 GX3/16"
1 NEEDLE, DISPOSABLE MISCELLANEOUS
Qty: 200 EACH | Refills: 2 | Status: SHIPPED | OUTPATIENT
Start: 2020-03-07 | End: 2020-03-07 | Stop reason: SDUPTHER

## 2020-03-07 RX ORDER — SACCHAROMYCES BOULARDII 250 MG
250 CAPSULE ORAL 2 TIMES DAILY
Qty: 10 CAPSULE | Refills: 0 | Status: SHIPPED | OUTPATIENT
Start: 2020-03-07 | End: 2020-03-07

## 2020-03-07 RX ORDER — CHLORPHENIR/PHENYLEPH/ASPIRIN 2-7.8-325
1 TABLET, EFFERVESCENT ORAL AS NEEDED
Qty: 50 STRIP | Refills: 2 | Status: ON HOLD | OUTPATIENT
Start: 2020-03-07 | End: 2020-07-26

## 2020-03-07 RX ORDER — LANCETS
1 EACH MISCELLANEOUS
Qty: 204 EACH | Refills: 2 | Status: SHIPPED | OUTPATIENT
Start: 2020-03-07 | End: 2020-03-07 | Stop reason: SDUPTHER

## 2020-03-07 RX ADMIN — IPRATROPIUM BROMIDE AND ALBUTEROL SULFATE 3 ML: .5; 3 SOLUTION RESPIRATORY (INHALATION) at 11:28

## 2020-03-07 RX ADMIN — LEVOTHYROXINE SODIUM 25 MCG: 25 TABLET ORAL at 05:04

## 2020-03-07 RX ADMIN — INSULIN LISPRO 3 UNITS: 100 INJECTION, SOLUTION INTRAVENOUS; SUBCUTANEOUS at 11:38

## 2020-03-07 RX ADMIN — CLINDAMYCIN HYDROCHLORIDE 300 MG: 300 CAPSULE ORAL at 05:04

## 2020-03-07 RX ADMIN — GABAPENTIN 100 MG: 100 CAPSULE ORAL at 05:04

## 2020-03-07 RX ADMIN — IPRATROPIUM BROMIDE AND ALBUTEROL SULFATE 3 ML: .5; 3 SOLUTION RESPIRATORY (INHALATION) at 07:59

## 2020-03-07 RX ADMIN — BUDESONIDE AND FORMOTEROL FUMARATE DIHYDRATE 2 PUFF: 160; 4.5 AEROSOL RESPIRATORY (INHALATION) at 08:00

## 2020-03-07 RX ADMIN — INSULIN LISPRO 5 UNITS: 100 INJECTION, SOLUTION INTRAVENOUS; SUBCUTANEOUS at 11:37

## 2020-03-07 RX ADMIN — INSULIN GLARGINE 10 UNITS: 100 INJECTION, SOLUTION SUBCUTANEOUS at 11:08

## 2020-03-07 NOTE — PLAN OF CARE
Continue to monitor pt     Problem: Patient Care Overview  Goal: Plan of Care Review  Outcome: Ongoing (interventions implemented as appropriate)  Flowsheets  Taken 3/6/2020 1533 by Carmen Deras RN  Progress: no change  Taken 3/7/2020 0741 by Khushboo Espinoza, RN  Plan of Care Reviewed With: patient  Taken 3/7/2020 0246 by Anne Mraie Lubin, RN  Outcome Summary: Pt resting well though the night with some complaints of tooth pain. Pt is able to ambulate to the chair to bed with miminal assistance. Will continue to monitor.  Goal: Individualization and Mutuality  Outcome: Ongoing (interventions implemented as appropriate)  Goal: Discharge Needs Assessment  Outcome: Ongoing (interventions implemented as appropriate)  Flowsheets  Taken 3/2/2020 1558 by Aicha Nvearez RN  Equipment Needed After Discharge: walker, rolling  Equipment Currently Used at Home: none  Transportation Anticipated: family or friend will provide  Concerns to be Addressed: no discharge needs identified;denies needs/concerns at this time  Readmission Within the Last 30 Days: no previous admission in last 30 days  Patient/Family Anticipates Transition to: inpatient rehabilitation facility  Taken 3/1/2020 2002 by Linda Figueroa, RN  Patient/Family Anticipated Services at Transition: none  Goal: Interprofessional Rounds/Family Conf  Outcome: Ongoing (interventions implemented as appropriate)  Flowsheets (Taken 3/5/2020 1524 by Misti Farrell RN)  Participants: nursing;physician;patient;physical therapy;

## 2020-03-07 NOTE — PROGRESS NOTES
"      [facility] Medicine Services Daily Progress Note      Hospitalist Team  LOS 5 days      Patient Care Team:  Provider, No Known as PCP - General    Patient Location: 226/1      Subjective   Subjective     Chief Complaint / Subjective  Chief Complaint   Patient presents with   • Weakness - Generalized         Brief Synopsis of Hospital Course/HPI      The patient is a 54 y.o. female with past medical history of lupus, alcohol and tobacco abuse who presented to [facility] ED on 3/1/2020 complaining of 9 days of nausea, vomiting, diarrhea and weakness.    The patient has not seen a physician for 3 years and is originally from [place].  She admits to drinking alcohol daily.  The patient has been falling at home because of weakness and denies focal weakness or dizziness before falls.    Date::    3/2/20: Patient received IV fluid boluses for low BP overnight.  Has not seen a physician for 3 years. Originally from [place].  History of lupus.  Not on medications at home.  Afebrile.  Claims to have several days of diarrhea and started on azithromycin for E. coli positive stool.  Endocrinology consulted.  3/3/20: Complained of right facial edema pain and oral pain.  Changed antibiotics to Levaquin and Flagyl.  DC azithromycin.  Blood glucose better controlled.  3/4/20: CT facial bone shows dental abscess and caries.  Afebrile.      Review of Systems   All other systems reviewed and are negative.        Objective   Objective      Vital Signs  Temp:  [97.6 °F (36.4 °C)-98.1 °F (36.7 °C)] 97.6 °F (36.4 °C)  Heart Rate:  [62-98] 84  Resp:  [16-18] 16  BP: (102)/(72) 102/72  Oxygen Therapy  SpO2: 99 %  Pulse Oximetry Type: Intermittent  Device (Oxygen Therapy): room air  Flow (L/min): 21  Flowsheet Rows      First Filed Value   Admission Height  157.5 cm (62\") Documented at 03/01/2020 1345   Admission Weight  47.8 kg (105 lb 6.1 oz) Documented at 03/01/2020 1345        Intake & Output (last 3 " days)       03/04 0701 - 03/05 0700 03/05 0701 - 03/06 0700 03/06 0701 - 03/07 0700 03/07 0701 - 03/08 0700    P.O. 1680 1200 720 720    IV Piggyback 100 100      Total Intake(mL/kg) 1780 (32.9) 1300 (24) 720 (13.4) 720 (13.4)    Urine (mL/kg/hr) 240 (0.2)       Stool 0       Total Output 240       Net +1540 +1300 +720 +720            Urine Unmeasured Occurrence 7 x       Stool Unmeasured Occurrence 4 x 1 x          Lines, Drains & Airways    Active LDAs     Name:   Placement date:   Placement time:   Site:   Days:    Peripheral IV 03/01/20 1429 Left Antecubital   03/01/20    1429    Antecubital   1    Peripheral IV 03/02/20 1200 Left;Posterior Hand   03/02/20    1200    Hand   less than 1                  Physical Exam:    Physical Exam   Constitutional: She is oriented to person, place, and time. She appears well-developed.   HENT:   Head: Normocephalic.   Nose: Nose normal.   Mouth/Throat: Oropharynx is clear and moist. Mucous membranes are dry. No oropharyngeal exudate.   Eyes: Pupils are equal, round, and reactive to light. Conjunctivae and EOM are normal.   Neck: Trachea normal, normal range of motion and full passive range of motion without pain. Neck supple.   Cardiovascular: Normal rate and regular rhythm.   Pulmonary/Chest: Effort normal and breath sounds normal.   Musculoskeletal:   Moves all extremities.   Lymphadenopathy:     She has no cervical adenopathy.   Neurological: She is alert and oriented to person, place, and time. No cranial nerve deficit.   Skin: Skin is warm and dry.   Psychiatric: She has a normal mood and affect. Her speech is normal and behavior is normal. Cognition and memory are normal.     Procedures: None      Results Review:     I reviewed the patient's new clinical results.      Lab Results (last 24 hours)     Procedure Component Value Units Date/Time    POC Glucose Once [[id number]]  (Abnormal) Collected:  03/07/20 1120    Specimen:  Blood Updated:  03/07/20 1127     Glucose 382  mg/dL      Comment: Serial Number: [id number]:  [id number]       POC Glucose Once [[id number]]  (Abnormal) Collected:  03/07/20 0724    Specimen:  Blood Updated:  03/07/20 0726     Glucose 296 mg/dL      Comment: Serial Number: [id number]:  [id number]       Basic Metabolic Panel [[id number]]  (Abnormal) Collected:  03/07/20 0628    Specimen:  Blood Updated:  03/07/20 0710     Glucose 370 mg/dL      BUN 7 mg/dL      Creatinine 0.49 mg/dL      Sodium 135 mmol/L      Potassium 4.8 mmol/L      Chloride 103 mmol/L      CO2 25.0 mmol/L      Calcium 8.6 mg/dL      eGFR Non African Amer 131 mL/min/1.73      BUN/Creatinine Ratio 14.3     Anion Gap 7.0 mmol/L     Narrative:       GFR Normal >60  Chronic Kidney Disease <60  Kidney Failure <15      Basic Metabolic Panel [[id number]]  (Abnormal) Collected:  03/07/20 0019    Specimen:  Blood Updated:  03/07/20 0101     Glucose 380 mg/dL      BUN 9 mg/dL      Creatinine 0.50 mg/dL      Sodium 134 mmol/L      Potassium 4.6 mmol/L      Chloride 102 mmol/L      CO2 24.0 mmol/L      Calcium 8.6 mg/dL      eGFR Non African Amer 128 mL/min/1.73      BUN/Creatinine Ratio 18.0     Anion Gap 8.0 mmol/L     Narrative:       GFR Normal >60  Chronic Kidney Disease <60  Kidney Failure <15      Basic Metabolic Panel [[id number]]  (Abnormal) Collected:  03/06/20 2025    Specimen:  Blood Updated:  03/06/20 2116     Glucose 323 mg/dL      BUN 9 mg/dL      Creatinine 0.65 mg/dL      Sodium 135 mmol/L      Potassium 4.8 mmol/L      Chloride 101 mmol/L      CO2 23.0 mmol/L      Calcium 8.7 mg/dL      eGFR Non African Amer 95 mL/min/1.73      BUN/Creatinine Ratio 13.8     Anion Gap 11.0 mmol/L     Narrative:       GFR Normal >60  Chronic Kidney Disease <60  Kidney Failure <15      POC Glucose Once [[id number]]  (Abnormal) Collected:  03/06/20 1934    Specimen:  Blood Updated:  03/06/20 1935     Glucose 301 mg/dL      Comment: Serial Number: [id number]:  [id number]       POC Glucose Once  [[id number]]  (Abnormal) Collected:  03/06/20 1633    Specimen:  Blood Updated:  03/06/20 1636     Glucose 299 mg/dL      Comment: Serial Number: [id number]:  [id number]       Basic Metabolic Panel [[id number]]  (Abnormal) Collected:  03/06/20 1340    Specimen:  Blood Updated:  03/06/20 1511     Glucose 273 mg/dL      BUN 6 mg/dL      Creatinine 0.72 mg/dL      Sodium 135 mmol/L      Potassium 4.4 mmol/L      Chloride 102 mmol/L      CO2 23.0 mmol/L      Calcium 8.7 mg/dL      eGFR Non African Amer 84 mL/min/1.73      BUN/Creatinine Ratio 8.3     Anion Gap 10.0 mmol/L     Narrative:       GFR Normal >60  Chronic Kidney Disease <60  Kidney Failure <15          No results found for: HGBA1C  Results from last 7 days   Lab Units 03/01/20  1432   INR  0.98           Lab Results   Component Value Date    LIPASE 39 03/01/2020     Lab Results   Component Value Date    CHOL 98 03/02/2020    TRIG 163 (H) 03/02/2020    HDL 38 (L) 03/02/2020    LDL 27 03/02/2020       No results found for: INTRAOP, PREDX, FINALDX, COMDX    Microbiology Results (last 10 days)     Procedure Component Value - Date/Time    Gastrointestinal Panel, PCR - Stool, Per Rectum [[id number]]  (Abnormal) Collected:  03/02/20 0440    Lab Status:  Final result Specimen:  Stool from Per Rectum Updated:  03/02/20 0841     Campylobacter Not Detected     Plesiomonas shigelloides Not Detected     Salmonella Not Detected     Vibrio Not Detected     Vibrio cholerae Not Detected     Yersinia enterocolitica Not Detected     Enteroaggregative E. coli (EAEC) Detected     Enteropathogenic E. coli (EPEC) Not Detected     Enterotoxigenic E. coli (ETEC) lt/st Not Detected     Shiga-like toxin-producing E. coli (STEC) stx1/stx2 Not Detected     E. coli O157 Not Detected     Shigella/Enteroinvasive E. coli (EIEC) Not Detected     Cryptosporidium Not Detected     Cyclospora cayetanensis Not Detected     Entamoeba histolytica Not Detected     Giardia lamblia Not Detected      Adenovirus F40/41 Not Detected     Astrovirus Not Detected     Norovirus GI/GII Not Detected     Rotavirus A Not Detected     Sapovirus (I, II, IV or V) Not Detected    Narrative:       If Aeromonas, Staphylococcus aureus or Bacillus cereus are suspected, please order [id number]: Stool Culture, Aeromonas, S aureus, B Cereus.    Respiratory Panel, PCR - Swab, Nasopharynx [[id number]]  (Normal) Collected:  03/02/20 0434    Lab Status:  Final result Specimen:  Swab from Nasopharynx Updated:  03/02/20 0629     ADENOVIRUS, PCR Not Detected     Coronavirus 229E Not Detected     Coronavirus HKU1 Not Detected     Coronavirus NL63 Not Detected     Coronavirus OC43 Not Detected     Human Metapneumovirus Not Detected     Human Rhinovirus/Enterovirus Not Detected     Influenza B PCR Not Detected     Parainfluenza Virus 1 Not Detected     Parainfluenza Virus 2 Not Detected     Parainfluenza Virus 3 Not Detected     Parainfluenza Virus 4 Not Detected     Bordetella pertussis pcr Not Detected     Influenza A H1 2009 PCR Not Detected     Chlamydophila pneumoniae PCR Not Detected     Mycoplasma pneumo by PCR Not Detected     Influenza A PCR Not Detected     Influenza A H3 Not Detected     Influenza A H1 Not Detected     RSV, PCR Not Detected    Urine Culture - Urine, Urine, Clean Catch [[id number]] Collected:  03/01/20 1530    Lab Status:  Final result Specimen:  Urine, Clean Catch Updated:  03/02/20 1634     Urine Culture >100,000 CFU/mL Mixed Emre Isolated    Narrative:       Specimen contains mixed organisms of questionable pathogenicity which indicates contamination with commensal emre.  Further identification is unlikely to provide clinically useful information.  Suggest recollection.          ECG/EMG Results (most recent)     Procedure Component Value Units Date/Time    ECG 12 Lead [[id number]]  (Abnormal) Resulted:  03/01/20 1624     Updated:  03/01/20 1624    ECG 12 Lead [[id number]] Collected:  03/02/20 0301     Updated:   03/02/20 0302    Narrative:       HEART RATE= 94  bpm  RR Interval= 636  ms  VA Interval= 130  ms  P Horizontal Axis= -10  deg  P Front Axis= 77  deg  QRSD Interval= 85  ms  QT Interval= 431  ms  QRS Axis= 82  deg  T Wave Axis= 265  deg  - ABNORMAL ECG -  Sinus rhythm  Repol abnrm suggests ischemia, diffuse leads  Prolonged QT interval  Electronically Signed By:   Date and Time of Study: 2020-03-02 03:01:10    ECG 12 Lead [[id number]] Collected:  03/01/20 1405     Updated:  03/02/20 0726    Narrative:       HEART RATE= 93  bpm  RR Interval= 644  ms  VA Interval= 136  ms  P Horizontal Axis= -4  deg  P Front Axis= 68  deg  QRSD Interval= 84  ms  QT Interval= 393  ms  QRS Axis= 75  deg  T Wave Axis= 262  deg  - ABNORMAL ECG -  Sinus rhythm  LAE, consider biatrial enlargement  Repol abnrm suggests ischemia, diffuse leads  Electronically Signed By: Berlin Smith ([facility]) 02-Mar-2020 07:26:06  Date and Time of Study: 2020-03-01 14:05:28                     Gallbladder    Result Date: 3/2/2020  1. Cholecystectomy. Normal caliber of the biliary tree. 2. Severe hepatic steatosis. 3. The right kidney shows no evidence of shadowing stone or hydronephrosis. Electronically signed by:  Mike Valdes M.D.  3/2/2020 1:11 AM    Xr Chest 1 View    Result Date: 3/2/2020  Impression: 1. Prominence of bronchopulmonary markings which can be seen in viral airway disease.  Please correlate for bronchitis. 2. No focal infiltrate. Electronically signed by:  Marsha Omalley M.D.  3/2/2020 1:47 AM    Ct Facial Bones With Contrast    Result Date: 3/4/2020   Multiple dental caries and periapical lucencies consistent with dental abscesses. Dental x-rays would be more useful at outpatient follow-up.  Electronically Signed By-Josef Matson On:3/4/2020 2:43 PM This report was finalized on [id number] by  Josef Matson, .          Xrays, labs reviewed personally by physician.    Medication Review:   I have reviewed the patient's current medication  list      Scheduled Meds    bismuth subsalicylate 524 mg Oral 4x Daily   budesonide-formoterol 2 puff Inhalation BID - RT   clindamycin 300 mg Oral Q8H   enoxaparin 40 mg Subcutaneous Q24H   folic acid 1 mg Oral Daily   gabapentin 100 mg Oral Q8H   insulin glargine 10 Units Subcutaneous QAM   insulin lispro 0-7 Units Subcutaneous TID With Meals   insulin lispro 3 Units Subcutaneous TID With Meals   ipratropium-albuterol 3 mL Nebulization 4x Daily - RT   levothyroxine 25 mcg Oral Q AM   nicotine 1 patch Transdermal Q24H   saccharomyces boulardii 250 mg Oral BID   sodium chloride 1,000 mL Intravenous Once   sodium chloride 10 mL Intravenous Q12H   sodium chloride 10 mL Intravenous Q12H   THERA 1 tablet Oral Daily   thiamine 100 mg Oral Daily       Meds Infusions       Meds PRN  •  acetaminophen  •  Calcium Gluconate-NaCl **AND** calcium gluconate **AND** Calcium, Ionized  •  dextrose  •  dextrose  •  glucagon (human recombinant)  •  magnesium sulfate **OR** magnesium sulfate **OR** magnesium sulfate  •  melatonin  •  nitroglycerin  •  ondansetron **OR** ondansetron  •  potassium & sodium phosphates **OR** potassium & sodium phosphates  •  potassium chloride  •  potassium chloride  •  potassium chloride  •  [COMPLETED] Insert peripheral IV **AND** sodium chloride  •  sodium chloride  •  sodium chloride      Assessment/Plan   Assessment/Plan     Active Hospital Problems    Diagnosis  POA   • **Hyperglycemia [R73.9]  Yes   • Hypophosphatemia [E83.39]  Yes   • Starvation ketoacidosis [E87.2]  Yes   • Type 2 diabetes mellitus (CMS/HCC) [E11.9]  Yes   • Hypokalemia, inadequate intake [E87.6]  Yes   • Tobacco abuse [Z72.0]  Unknown   • Alcoholism /alcohol abuse (CMS/HCC) [F10.20]  Yes   • Hyponatremia [E87.1]  Yes   • Transaminitis [R74.0]  Yes   • Insomnia [G47.00]  Yes      Resolved Hospital Problems   No resolved problems to display.       MEDICAL DECISION MAKING COMPLEXITY BY PROBLEM:     1.  New onset diabetes mellitus  , labile  - type 1 per endocrinology, based on low C peptide  -Hemoglobin A1c 12.9  -Continue Lantus, mealtime insulin and ISS   -Endocrinology and diabetic nurse educator consulted  - remains wide fluctuating- lantus dose followed by Dr. Regalado- still hyperglycemic     2.  Infectious diarrhea  ; due to E.coli( EAEC)  ; received zithromax x2 dose and Levaquin x4dose -  completed levaquine   for 5 days course( given due to her immunosuppressed status)  ; slowing down in diarrhea - peptobismol - scheduled  ; continue supportive tx and avoid dehydration          3.  Right facial cellulitis with dental abscess, POA   -s/p CT facial bone   - no oral surgery service  - reports she was trying to see oral surgeon as outpt but cannot afford  - on clindamycin, stable  - resolved in cellulitis,     4.  New hypothyroidism:  -Started on Synthroid by endocrinology    5. Hypokalemia and hypophosphatemia:  -Replacement protocol ordered  -Consult nutrition for calorie count  - resolved     6.  Alcohol abuse  -reports 6months of heavy drinking after father .   - on MVI, thiamine, neurontin  - no sign of active withdrawal- dc ativan     7.  Tobacco abuse  -Nicotine patch ordered    8.  Lupus:  -Has not been on Plaquenil for 3 years    9. COPD without exacerbation  ; duoneb, on steroid inhaler  ; feels better in [place]     10. Acute deconditioning  - still very frail , walking with PT but very SOB on exertion due to COPD    11. Cachexia and moderate protein and calorie malnutrition      VTE Prophylaxis -   Mechanical Order History:     None      Pharmalogical Order History:     Ordered     Dose Route Frequency Stop    20 0821  enoxaparin (LOVENOX) syringe 40 mg      40 mg SC Every 24 Hours --    20 2030  enoxaparin (LOVENOX) syringe 30 mg  Status:  Discontinued      30 mg SC Daily 20 0835            Code Status -   Code Status and Medical Interventions:   Ordered at: 20 1605     Level Of Support Discussed With:     Patient     Code Status:    CPR     Medical Interventions (Level of Support Prior to Arrest):    Full       Discharge Planning   Pending rehab discharge when diarrhea is stable        Destination      Coordination has not been started for this encounter.      Durable Medical Equipment      Coordination has not been started for this encounter.      Dialysis/Infusion      Coordination has not been started for this encounter.      Home Medical Care      Coordination has not been started for this encounter.      Therapy      Coordination has not been started for this encounter.      Community Resources      Coordination has not been started for this encounter.            Electronically signed by Esme Stanton MD, 03/07/20, 12:14 PM.  [facility] Hospitalist Team

## 2020-03-07 NOTE — PLAN OF CARE
Problem: Patient Care Overview  Goal: Plan of Care Review  Outcome: Ongoing (interventions implemented as appropriate)  Flowsheets  Taken 3/6/2020 1328 by Luisa Richey PTA  Plan of Care Reviewed With: patient  Taken 3/7/2020 0246 by Anne Marie Lubin RN  Outcome Summary: Pt resting well though the night with some complaints of tooth pain. Pt is able to ambulate to the chair to bed with miminal assistance. Will continue to monitor.

## 2020-03-07 NOTE — DISCHARGE SUMMARY
[facility] Medicine Services  DISCHARGE SUMMARY        Prepared For PCP:  Provider, No Known    Patient Name: Smita Steele  : 1965  MRN: [id number]      Date of Admission:   3/1/2020    Date of Discharge:  3/7/2020    Length of stay:  LOS: 5 days     Hospital Course     Presenting Problem:   Dehydration [E86.0]  Hypokalemia, inadequate intake [E87.6]  Oral thrush [B37.0]  Hyperglycemia [R73.9]  Diarrhea, unspecified type [R19.7]  Non-intractable vomiting with nausea, unspecified vomiting type [R11.2]  Hypokalemia, inadequate intake [E87.6]      Active Hospital Problems    Diagnosis  POA   • **Hyperglycemia [R73.9]  Yes   • Hypophosphatemia [E83.39]  Yes   • Starvation ketoacidosis [E87.2]  Yes   • Type 2 diabetes mellitus (CMS/HCC) [E11.9]  Yes   • Hypokalemia, inadequate intake [E87.6]  Yes   • Tobacco abuse [Z72.0]  Unknown   • Alcoholism /alcohol abuse (CMS/HCC) [F10.20]  Yes   • Hyponatremia [E87.1]  Yes   • Transaminitis [R74.0]  Yes   • Insomnia [G47.00]  Yes      Resolved Hospital Problems   No resolved problems to display.     Primary discharge diagnosis  1.  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  Dehydration  3.  Multiple electrolyte imbalance including hypokalemia, hypophosphatemia  4.  Right facial cellulitis and dental abscess  5.  Newly diagnosed hypothyroid  6.  Newly diagnosed IDDM, type I, labile, as per endocrinology  7.  Acute deconditioning  8.  Cachexia and moderate protein calorie malnutrition  9.  Alcoholic hepatitis/ fatty liver    Secondary diagnosis    1 alcohol abuse  2.  History of lupus, not on treatment, due to medical noncompliance  3.  COPD without exacerbation  4.  Tobacco abuse      Hospital Course:  Smita Steele is a 54 y.o. female presenting with nausea vomiting, diarrhea and generalized weakness for 9 days.  Due to the weakness she has been falling multiple times.  So she developed polyuria and polydipsia.  She admits that she has  been drinking heavily, 4 shots of vodka a day after her father passed away.  She continues to smoke 1 pack/day.    On admission sugar was over 400, sign of DKA and metabolic alkalosis.  Patient received hydration.  Potassium level 1.6 requiring replacement.  Also received a phosphorus replacement which was below 0.3 on admission.  Likely due to severe diarrhea.  Stool studies showed positive for EAEC.  Given the severity of diarrhea and possible immune compromised status due to  diabetes and lupus, she received antibiotics by Zithromax followed by Levaquin, total 5 days.  She received the Pepto-Bismol for the diarrhea control.  Over the last 24 hours diarrhea slowed down significantly.  She feels much comfortable today and currently tolerating the regular diet.  Denies any abdominal pain.    Hyperglycemia: Followed by endocrinology, Dr. Regalado.  Showed labile and fluctuating sugar level and still overall elevated.  To low C-peptide level, endocrinologist thinks she has a type 1 diabetes.  Therefore patient was discharged on Lantus at 20 units with a pre-meal lispro 6 units and sliding scale insulin.    COPD; this time applied Symbicort, Spiriva and albuterol inhaler.  She is willing to quit smoking.    Alcohol abuse; she reported as transient due to bereavement of her father's death.  No sign of active withdrawal during the stay.  He is willing to quit.    Patient is stable for the transfer to rehab facility as arranged.      She was advised to follow with order surgeon or dentist for the dental abscess and tooth extraction.  Patient can go to the [facility] for the tooth extraction at the low cost        Recommendation for Outpatient Providers:   Follow-up with the PCP in 1 week  Follow-up with oral surgeon in 1 week at [facility]  Follow with endocrinologist Dr. Regalado  in 1 week        Reasons For Change In Medications and Indications for New Medications:    Added Symbicort, Spiriva,  albuterol inhaler  Continue clindamycin 3 more days for dental abscess  Continue probiotics and Pepto-Bismol as prescribed for few more days - may stop if no more diarrhea    Day of Discharge     HPI: She feels better today with a few diarrhea, Getting formed      Vital Signs:   Temp:  [97.6 °F (36.4 °C)-98.1 °F (36.7 °C)] 97.8 °F (36.6 °C)  Heart Rate:  [] 116  Resp:  [16-18] 17  BP: (102-119)/(72-77) 119/77     Physical Exam:  Physical Exam  Constitutional: She is oriented to person, place, and time. She appears well-developed.   HENT:   Head: Normocephalic.   Mouth/Throat: Mucous membranes are moist  Eyes: Pupils are equal, round, and reactive to light.   Neck: Trachea normal and full passive range of motion without pain.   Cardiovascular: Normal rate and regular rhythm.   Pulmonary/Chest: Effort normal and breath sounds normal.   Musculoskeletal:   Moves all extremities.   Lymphadenopathy:     She has no cervical adenopathy.   Neurological: She is alert and oriented to person, place, and time. No cranial nerve deficit.   Skin: Skin is warm and dry.   Psychiatric: She has a normal mood and affect. Her speech is normal and behavior is normal. Cognition and memory are normal.     Pertinent  and/or Most Recent Results     Results from last 7 days   Lab Units 03/07/20  0628 03/07/20  0019 03/06/20 2025 03/06/20  1340 03/06/20  0522 03/06/20  0000 03/05/20  1745  03/03/20  0249  03/02/20  0258  03/01/20  1433   WBC 10*3/mm3  --   --   --   --   --   --   --   --  8.80  --  6.10  --  7.00   HEMOGLOBIN g/dL  --   --   --   --   --   --   --   --  11.1*  --  11.3*  --  14.6   HEMATOCRIT %  --   --   --   --   --   --   --   --  30.9*  --  31.9*  --  40.0   PLATELETS 10*3/mm3  --   --   --   --   --   --   --   --  202  --  202  --  230   SODIUM mmol/L 135* 134* 135* 135* 133* 136 134*   < > 136   < > 138   < >  --    POTASSIUM mmol/L 4.8 4.6 4.8 4.4 4.4 4.4 4.1   < > 3.1*  3.2*   < > 2.2*   < >  --    CHLORIDE  mmol/L 103 102 101 102 101 102 102   < > 98   < > 92*   < >  --    CO2 mmol/L 25.0 24.0 23.0 23.0 22.0 22.0 21.0*   < > 26.0   < > 32.0*   < >  --    BUN mg/dL 7 9 9 6 5* 6 6   < > 5*   < > 7   < >  --    CREATININE mg/dL 0.49* 0.50* 0.65 0.72 0.48* 0.49* 0.44*   < > 0.34*   < > 0.48*   < >  --    GLUCOSE mg/dL 370* 380* 323* 273* 428* 222* 71   < > 125*   < > 141*   < >  --    CALCIUM mg/dL 8.6 8.6 8.7 8.7 8.4* 8.7 8.5*   < > 7.9*   < > 7.6*   < >  --     < > = values in this interval not displayed.     Results from last 7 days   Lab Units 03/03/20 0249 03/02/20 0258 03/01/20  1532 03/01/20  1432   BILIRUBIN mg/dL 0.9 1.1 1.2  --    ALK PHOS U/L 137* 121* 155*  --    ALT (SGPT) U/L 62* 52* 60*  --    AST (SGOT) U/L 81* 55* 52*  --    PROTIME Seconds  --   --   --  10.3   INR   --   --   --  0.98     Results from last 7 days   Lab Units 03/02/20  0258   CHOLESTEROL mg/dL 98   TRIGLYCERIDES mg/dL 163*   HDL CHOL mg/dL 38*     Results from last 7 days   Lab Units 03/03/20  0249 03/02/20  0528 03/02/20  0258 03/01/20  1433   TSH uIU/mL 6.680*  --  6.580*  --    HEMOGLOBIN A1C %  --   --  12.9*  --    PROBNP pg/mL  --   --  1,117.0* 2,043.0*   TROPONIN T ng/mL  --   --   --  <0.010   PROCALCITONIN ng/mL 1.04*  --   --   --    LACTATE mmol/L  --  1.3  --   --        Brief Urine Lab Results  (Last result in the past 365 days)      Color   Clarity   Blood   Leuk Est   Nitrite   Protein   CREAT   Urine HCG        03/01/20 1530 Yellow Cloudy  Comment:  Result checked  Trace Small (1+) Negative 30 mg/dL (1+)               Microbiology Results Abnormal     Procedure Component Value - Date/Time    Urine Culture - Urine, Urine, Clean Catch [[id number]] Collected:  03/01/20 1530    Lab Status:  Final result Specimen:  Urine, Clean Catch Updated:  03/02/20 1634     Urine Culture >100,000 CFU/mL Mixed Lulu Isolated    Narrative:       Specimen contains mixed organisms of questionable pathogenicity which indicates contamination  with commensal emre.  Further identification is unlikely to provide clinically useful information.  Suggest recollection.    Gastrointestinal Panel, PCR - Stool, Per Rectum [[id number]]  (Abnormal) Collected:  03/02/20 0440    Lab Status:  Final result Specimen:  Stool from Per Rectum Updated:  03/02/20 0841     Campylobacter Not Detected     Plesiomonas shigelloides Not Detected     Salmonella Not Detected     Vibrio Not Detected     Vibrio cholerae Not Detected     Yersinia enterocolitica Not Detected     Enteroaggregative E. coli (EAEC) Detected     Enteropathogenic E. coli (EPEC) Not Detected     Enterotoxigenic E. coli (ETEC) lt/st Not Detected     Shiga-like toxin-producing E. coli (STEC) stx1/stx2 Not Detected     E. coli O157 Not Detected     Shigella/Enteroinvasive E. coli (EIEC) Not Detected     Cryptosporidium Not Detected     Cyclospora cayetanensis Not Detected     Entamoeba histolytica Not Detected     Giardia lamblia Not Detected     Adenovirus F40/41 Not Detected     Astrovirus Not Detected     Norovirus GI/GII Not Detected     Rotavirus A Not Detected     Sapovirus (I, II, IV or V) Not Detected    Narrative:       If Aeromonas, Staphylococcus aureus or Bacillus cereus are suspected, please order [id number]: Stool Culture, Aeromonas, S aureus, B Cereus.    Respiratory Panel, PCR - Swab, Nasopharynx [[id number]]  (Normal) Collected:  03/02/20 0434    Lab Status:  Final result Specimen:  Swab from Nasopharynx Updated:  03/02/20 0629     ADENOVIRUS, PCR Not Detected     Coronavirus 229E Not Detected     Coronavirus HKU1 Not Detected     Coronavirus NL63 Not Detected     Coronavirus OC43 Not Detected     Human Metapneumovirus Not Detected     Human Rhinovirus/Enterovirus Not Detected     Influenza B PCR Not Detected     Parainfluenza Virus 1 Not Detected     Parainfluenza Virus 2 Not Detected     Parainfluenza Virus 3 Not Detected     Parainfluenza Virus 4 Not Detected     Bordetella pertussis pcr Not  Detected     Influenza A H1 2009 PCR Not Detected     Chlamydophila pneumoniae PCR Not Detected     Mycoplasma pneumo by PCR Not Detected     Influenza A PCR Not Detected     Influenza A H3 Not Detected     Influenza A H1 Not Detected     RSV, PCR Not Detected          Us Gallbladder    Result Date: 3/2/2020  Impression: 1. Cholecystectomy. Normal caliber of the biliary tree. 2. Severe hepatic steatosis. 3. The right kidney shows no evidence of shadowing stone or hydronephrosis. Electronically signed by:  Mike Valdes M.D.  3/2/2020 1:11 AM    Xr Chest 1 View    Result Date: 3/2/2020  Impression: Impression: 1. Prominence of bronchopulmonary markings which can be seen in viral airway disease.  Please correlate for bronchitis. 2. No focal infiltrate. Electronically signed by:  Marsha Omalley M.D.  3/2/2020 1:47 AM    Ct Facial Bones With Contrast    Result Date: 3/4/2020  Impression:  Multiple dental caries and periapical lucencies consistent with dental abscesses. Dental x-rays would be more useful at outpatient follow-up.  Electronically Signed By-Josef Matson On:3/4/2020 2:43 PM This report was finalized on [id number] by  Josef Matson, .                             Test Results Pending at Discharge   Order Current Status    Pathology Consultation Collected (03/03/20 0249)            Procedures Performed     NA      Consults:   Consults     Date and Time Order Name Status Description    3/1/2020 2030 Inpatient Endocrinology Consult      3/1/2020 1611 Hospitalist (on-call MD unless specified) Completed             Discharge Details        Discharge Medications      New Medications      Instructions Start Date   ACCU-CHEK FASTCLIX LANCETS misc   1 each, Does not apply, 4 Times Daily Before Meals & Nightly, Dx: E10.65      albuterol sulfate  (90 Base) MCG/ACT inhaler  Commonly known as:  PROVENTIL HFA;VENTOLIN HFA;PROAIR HFA   2 puffs, Inhalation, Every 4 Hours PRN      bismuth subsalicylate 262 MG  chewable tablet  Commonly known as:  PEPTO BISMOL   524 mg, Oral, 4 Times Daily      budesonide-formoterol 160-4.5 MCG/ACT inhaler  Commonly known as:  SYMBICORT   2 puffs, Inhalation, 2 Times Daily - RT      clindamycin 300 MG capsule  Commonly known as:  CLEOCIN   300 mg, Oral, Every 8 Hours Scheduled      gabapentin 100 MG capsule  Commonly known as:  NEURONTIN   100 mg, Oral, 2 Times Daily      glucose blood test strip  Commonly known as:  ACCU-CHEK GUIDE   1 each, Other, 4 Times Daily Before Meals & Nightly, Dx: E10.65. Use as instructed      Insulin Glargine 100 UNIT/ML injection pen  Commonly known as:  LANTUS SOLOSTAR   20 Units, Subcutaneous, Daily      Insulin Lispro (1 Unit Dial) 100 UNIT/ML solution pen-injector  Commonly known as:  HUMALOG KWIKPEN   6 Units, Subcutaneous, 3 Times Daily With Meals      insulin lispro 100 UNIT/ML injection  Commonly known as:  humaLOG   0-7 Units, Subcutaneous, 3 Times Daily With Meals      Ketone Test strip   1 each, In Vitro, As Needed, Dx: E10.65      levothyroxine 25 MCG tablet  Commonly known as:  SYNTHROID, LEVOTHROID   25 mcg, Oral, Daily      Pen Needles 32G X 4 MM misc   1 each, Does not apply, 4 Times Daily Before Meals & Nightly      saccharomyces boulardii 250 MG capsule  Commonly known as:  FLORASTOR   250 mg, Oral, 2 Times Daily      THERA tablet tablet   1 tablet, Oral, Daily   Start Date:  March 8, 2020     tiotropium 18 MCG per inhalation capsule  Commonly known as:  SPIRIVA HANDIHALER   1 capsule, Inhalation, Daily - RT         Stop These Medications    ibuprofen 200 MG tablet  Commonly known as:  ADVIL,MOTRIN            Allergies   Allergen Reactions   • Penicillins Unknown - High Severity         Discharge Disposition:  Skilled Nursing Facility (DC - External)    Diet:  Hospital:  Diet Order   Procedures   • Diet Diabetic/Consistent Carbs; Diabetic - Consistent Carb         Discharge Activity: as tolerated        CODE STATUS:    Code Status and Medical  Interventions:   Ordered at: 03/01/20 1654     Level Of Support Discussed With:    Patient     Code Status:    CPR     Medical Interventions (Level of Support Prior to Arrest):    Full         Follow-up Appointments  No future appointments.          Condition on Discharge:      Stable          Electronically signed by Esme Stanton MD, 03/07/20, 1:05 PM.    Time: I spent  35  minutes on this discharge activity which included face-to-face encounter with the patient/reviewing the data in the system/coordination of the care with the nursing staff as well as consultants/documentation/entering orders.

## 2020-03-08 PROCEDURE — 93010 ELECTROCARDIOGRAM REPORT: CPT | Performed by: INTERNAL MEDICINE

## 2020-03-09 NOTE — PROGRESS NOTES
Case Management Discharge Note      Final Note: [facility] for inpt rehab    Provided Post Acute Provider List?: Yes  Post Acute Provider List: Inpatient Rehab  Delivered To: Patient  Method of Delivery: In person    Destination - Selection Complete      Service Provider Request Status Selected Services Address Phone Number Fax Number    [facility] Selected Skilled Nursing [facility] [address] [place] [phone]                   Final Discharge Disposition Code: 03 - skilled nursing facility (SNF)

## 2020-03-09 NOTE — PAYOR COMM NOTE
"PATIENT HAS DISCHARGED. SEE ATTACHED DC SUMMARY.SUSPNEDED  PA#[id number]      Yaneth Smith RN  Utilization Review          [address]  [address] [phone] Office  [phone] Fax  [email or web address]   [facility]       Smita Mancini (54 y.o. Female)     Date of Birth Social Security Number Address Home Phone MRN    1965  [id number] [facility] [facility] [place] [id number] [phone] [id number]    Orthodoxy Marital Status          Unknown Single       Admission Date Admission Type Admitting Provider Attending Provider Department, Room/Bed    3/1/20 Emergency Esme Trammell MD  [facility] MEDICAL INPATIENT,     Discharge Date Discharge Disposition Discharge Destination        3/7/2020 Skilled Nursing Facility (DC - External)              Attending Provider:  (none)   Allergies:  Penicillins    Isolation:  None   Infection:  None   Code Status:  Prior    Ht:  157.5 cm (62.01\")   Wt:  53.8 kg (118 lb 9.6 oz)    Admission Cmt:  None   Principal Problem:  Hyperglycemia [R73.9]                 Active Insurance as of 3/1/2020     Primary Coverage     Payor Plan Insurance Group Employer/Plan Group    [place]      Payor Plan Address Payor Plan Phone Number Payor Plan Fax Number Effective Dates    [address]   2020 - None Entered    [place] [address]       Subscriber Name Subscriber Birth Date Member ID       SMITA MANCINI 1965 [id number]                 Emergency Contacts      (Rel.) Home Phone Work Phone Mobile Phone    MILADYS WHITE (Daughter) -- -- [phone]               Discharge Summary      Esme Trammell MD at 20 1304                [facility]  DISCHARGE SUMMARY        Prepared For PCP:  Provider, No Known    Patient Name: Smita Mancini  : 1965  MRN: [id number]      Date of Admission:   " 3/1/2020    Date of Discharge:  3/7/2020    Length of stay:  LOS: 5 days     Hospital Course     Presenting Problem:   Dehydration [E86.0]  Hypokalemia, inadequate intake [E87.6]  Oral thrush [B37.0]  Hyperglycemia [R73.9]  Diarrhea, unspecified type [R19.7]  Non-intractable vomiting with nausea, unspecified vomiting type [R11.2]  Hypokalemia, inadequate intake [E87.6]      Active Hospital Problems    Diagnosis  POA   • **Hyperglycemia [R73.9]  Yes   • Hypophosphatemia [E83.39]  Yes   • Starvation ketoacidosis [E87.2]  Yes   • Type 2 diabetes mellitus (CMS/HCC) [E11.9]  Yes   • Hypokalemia, inadequate intake [E87.6]  Yes   • Tobacco abuse [Z72.0]  Unknown   • Alcoholism /alcohol abuse (CMS/HCC) [F10.20]  Yes   • Hyponatremia [E87.1]  Yes   • Transaminitis [R74.0]  Yes   • Insomnia [G47.00]  Yes      Resolved Hospital Problems   No resolved problems to display.     Primary discharge diagnosis  1.  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  Dehydration  3.  Multiple electrolyte imbalance including hypokalemia, hypophosphatemia  4.  Right facial cellulitis and dental abscess  5.  Newly diagnosed hypothyroid  6.  Newly diagnosed IDDM, type I, labile, as per endocrinology  7.  Acute deconditioning  8.  Cachexia and moderate protein calorie malnutrition  9.  Alcoholic hepatitis/ fatty liver    Secondary diagnosis    1 alcohol abuse  2.  History of lupus, not on treatment, due to medical noncompliance  3.  COPD without exacerbation  4.  Tobacco abuse      Hospital Course:  Smita Steele is a 54 y.o. female presenting with nausea vomiting, diarrhea and generalized weakness for 9 days.  Due to the weakness she has been falling multiple times.  So she developed polyuria and polydipsia.  She admits that she has been drinking heavily, 4 shots of vodka a day after her father passed away.  She continues to smoke 1 pack/day.    On admission sugar was over 400, sign of DKA and metabolic alkalosis.  Patient received  hydration.  Potassium level 1.6 requiring replacement.  Also received a phosphorus replacement which was below 0.3 on admission.  Likely due to severe diarrhea.  Stool studies showed positive for EAEC.  Given the severity of diarrhea and possible immune compromised status due to  diabetes and lupus, she received antibiotics by Zithromax followed by Levaquin, total 5 days.  She received the Pepto-Bismol for the diarrhea control.  Over the last 24 hours diarrhea slowed down significantly.  She feels much comfortable today and currently tolerating the regular diet.  Denies any abdominal pain.    Hyperglycemia: Followed by endocrinology, Dr. Regalado.  Showed labile and fluctuating sugar level and still overall elevated.  To low C-peptide level, endocrinologist thinks she has a type 1 diabetes.  Therefore patient was discharged on Lantus at 20 units with a pre-meal lispro 6 units and sliding scale insulin.    COPD; this time applied Symbicort, Spiriva and albuterol inhaler.  She is willing to quit smoking.    Alcohol abuse; she reported as transient due to bereavement of her father's death.  No sign of active withdrawal during the stay.  He is willing to quit.    Patient is stable for the transfer to rehab facility as arranged.      She was advised to follow with order surgeon or dentist for the dental abscess and tooth extraction.  Patient can go to the [facility] for the tooth extraction at the low cost        Recommendation for Outpatient Providers:   Follow-up with the PCP in 1 week  Follow-up with oral surgeon in 1 week at [facility]  Follow with endocrinologist Dr. Regalado  in 1 week        Reasons For Change In Medications and Indications for New Medications:    Added Symbicort, Spiriva, albuterol inhaler  Continue clindamycin 3 more days for dental abscess  Continue probiotics and Pepto-Bismol as prescribed for few more days - may stop if no more diarrhea    Day of Discharge     HPI: She  feels better today with a few diarrhea, Getting formed      Vital Signs:   Temp:  [97.6 °F (36.4 °C)-98.1 °F (36.7 °C)] 97.8 °F (36.6 °C)  Heart Rate:  [] 116  Resp:  [16-18] 17  BP: (102-119)/(72-77) 119/77     Physical Exam:  Physical Exam  Constitutional: She is oriented to person, place, and time. She appears well-developed.   HENT:   Head: Normocephalic.   Mouth/Throat: Mucous membranes are moist  Eyes: Pupils are equal, round, and reactive to light.   Neck: Trachea normal and full passive range of motion without pain.   Cardiovascular: Normal rate and regular rhythm.   Pulmonary/Chest: Effort normal and breath sounds normal.   Musculoskeletal:   Moves all extremities.   Lymphadenopathy:     She has no cervical adenopathy.   Neurological: She is alert and oriented to person, place, and time. No cranial nerve deficit.   Skin: Skin is warm and dry.   Psychiatric: She has a normal mood and affect. Her speech is normal and behavior is normal. Cognition and memory are normal.     Pertinent  and/or Most Recent Results     Results from last 7 days   Lab Units 03/07/20  0628 03/07/20  0019 03/06/20  2025 03/06/20  1340 03/06/20  0522 03/06/20  0000 03/05/20  1745  03/03/20  0249  03/02/20  0258  03/01/20  1433   WBC 10*3/mm3  --   --   --   --   --   --   --   --  8.80  --  6.10  --  7.00   HEMOGLOBIN g/dL  --   --   --   --   --   --   --   --  11.1*  --  11.3*  --  14.6   HEMATOCRIT %  --   --   --   --   --   --   --   --  30.9*  --  31.9*  --  40.0   PLATELETS 10*3/mm3  --   --   --   --   --   --   --   --  202  --  202  --  230   SODIUM mmol/L 135* 134* 135* 135* 133* 136 134*   < > 136   < > 138   < >  --    POTASSIUM mmol/L 4.8 4.6 4.8 4.4 4.4 4.4 4.1   < > 3.1*  3.2*   < > 2.2*   < >  --    CHLORIDE mmol/L 103 102 101 102 101 102 102   < > 98   < > 92*   < >  --    CO2 mmol/L 25.0 24.0 23.0 23.0 22.0 22.0 21.0*   < > 26.0   < > 32.0*   < >  --    BUN mg/dL 7 9 9 6 5* 6 6   < > 5*   < > 7   < >  --     CREATININE mg/dL 0.49* 0.50* 0.65 0.72 0.48* 0.49* 0.44*   < > 0.34*   < > 0.48*   < >  --    GLUCOSE mg/dL 370* 380* 323* 273* 428* 222* 71   < > 125*   < > 141*   < >  --    CALCIUM mg/dL 8.6 8.6 8.7 8.7 8.4* 8.7 8.5*   < > 7.9*   < > 7.6*   < >  --     < > = values in this interval not displayed.     Results from last 7 days   Lab Units 03/03/20  0249 03/02/20  0258 03/01/20  1532 03/01/20  1432   BILIRUBIN mg/dL 0.9 1.1 1.2  --    ALK PHOS U/L 137* 121* 155*  --    ALT (SGPT) U/L 62* 52* 60*  --    AST (SGOT) U/L 81* 55* 52*  --    PROTIME Seconds  --   --   --  10.3   INR   --   --   --  0.98     Results from last 7 days   Lab Units 03/02/20  0258   CHOLESTEROL mg/dL 98   TRIGLYCERIDES mg/dL 163*   HDL CHOL mg/dL 38*     Results from last 7 days   Lab Units 03/03/20  0249 03/02/20  0528 03/02/20  0258 03/01/20  1433   TSH uIU/mL 6.680*  --  6.580*  --    HEMOGLOBIN A1C %  --   --  12.9*  --    PROBNP pg/mL  --   --  1,117.0* 2,043.0*   TROPONIN T ng/mL  --   --   --  <0.010   PROCALCITONIN ng/mL 1.04*  --   --   --    LACTATE mmol/L  --  1.3  --   --        Brief Urine Lab Results  (Last result in the past 365 days)      Color   Clarity   Blood   Leuk Est   Nitrite   Protein   CREAT   Urine HCG        03/01/20 1530 Yellow Cloudy  Comment:  Result checked  Trace Small (1+) Negative 30 mg/dL (1+)               Microbiology Results Abnormal     Procedure Component Value - Date/Time    Urine Culture - Urine, Urine, Clean Catch [[id number]] Collected:  03/01/20 1530    Lab Status:  Final result Specimen:  Urine, Clean Catch Updated:  03/02/20 1634     Urine Culture >100,000 CFU/mL Mixed Emre Isolated    Narrative:       Specimen contains mixed organisms of questionable pathogenicity which indicates contamination with commensal emre.  Further identification is unlikely to provide clinically useful information.  Suggest recollection.    Gastrointestinal Panel, PCR - Stool, Per Rectum [[id number]]  (Abnormal)  Collected:  03/02/20 0440    Lab Status:  Final result Specimen:  Stool from Per Rectum Updated:  03/02/20 0841     Campylobacter Not Detected     Plesiomonas shigelloides Not Detected     Salmonella Not Detected     Vibrio Not Detected     Vibrio cholerae Not Detected     Yersinia enterocolitica Not Detected     Enteroaggregative E. coli (EAEC) Detected     Enteropathogenic E. coli (EPEC) Not Detected     Enterotoxigenic E. coli (ETEC) lt/st Not Detected     Shiga-like toxin-producing E. coli (STEC) stx1/stx2 Not Detected     E. coli O157 Not Detected     Shigella/Enteroinvasive E. coli (EIEC) Not Detected     Cryptosporidium Not Detected     Cyclospora cayetanensis Not Detected     Entamoeba histolytica Not Detected     Giardia lamblia Not Detected     Adenovirus F40/41 Not Detected     Astrovirus Not Detected     Norovirus GI/GII Not Detected     Rotavirus A Not Detected     Sapovirus (I, II, IV or V) Not Detected    Narrative:       If Aeromonas, Staphylococcus aureus or Bacillus cereus are suspected, please order [id number]: Stool Culture, Aeromonas, S aureus, B Cereus.    Respiratory Panel, PCR - Swab, Nasopharynx [[id number]]  (Normal) Collected:  03/02/20 0434    Lab Status:  Final result Specimen:  Swab from Nasopharynx Updated:  03/02/20 0629     ADENOVIRUS, PCR Not Detected     Coronavirus 229E Not Detected     Coronavirus HKU1 Not Detected     Coronavirus NL63 Not Detected     Coronavirus OC43 Not Detected     Human Metapneumovirus Not Detected     Human Rhinovirus/Enterovirus Not Detected     Influenza B PCR Not Detected     Parainfluenza Virus 1 Not Detected     Parainfluenza Virus 2 Not Detected     Parainfluenza Virus 3 Not Detected     Parainfluenza Virus 4 Not Detected     Bordetella pertussis pcr Not Detected     Influenza A H1 2009 PCR Not Detected     Chlamydophila pneumoniae PCR Not Detected     Mycoplasma pneumo by PCR Not Detected     Influenza A PCR Not Detected     Influenza A H3 Not Detected        Influenza A H1 Not Detected     RSV, PCR Not Detected          Us Gallbladder    Result Date: 3/2/2020  Impression: 1. Cholecystectomy. Normal caliber of the biliary tree. 2. Severe hepatic steatosis. 3. The right kidney shows no evidence of shadowing stone or hydronephrosis. Electronically signed by:  Mike Valdes M.D.  3/2/2020 1:11 AM    Xr Chest 1 View    Result Date: 3/2/2020  Impression: Impression: 1. Prominence of bronchopulmonary markings which can be seen in viral airway disease.  Please correlate for bronchitis. 2. No focal infiltrate. Electronically signed by:  Marsha Omalley M.D.  3/2/2020 1:47 AM    Ct Facial Bones With Contrast    Result Date: 3/4/2020  Impression:  Multiple dental caries and periapical lucencies consistent with dental abscesses. Dental x-rays would be more useful at outpatient follow-up.  Electronically Signed By-Josef Matson On:3/4/2020 2:43 PM This report was finalized on [id number] by  Josef Matson, .                             Test Results Pending at Discharge   Order Current Status    Pathology Consultation Collected (03/03/20 0249)            Procedures Performed     NA      Consults:   Consults     Date and Time Order Name Status Description    3/1/2020 2030 Inpatient Endocrinology Consult      3/1/2020 1611 Hospitalist (on-call MD unless specified) Completed             Discharge Details        Discharge Medications      New Medications      Instructions Start Date   ACCU-CHEK FASTCLIX LANCETS misc   1 each, Does not apply, 4 Times Daily Before Meals & Nightly, Dx: E10.65      albuterol sulfate  (90 Base) MCG/ACT inhaler  Commonly known as:  PROVENTIL HFA;VENTOLIN HFA;PROAIR HFA   2 puffs, Inhalation, Every 4 Hours PRN      bismuth subsalicylate 262 MG chewable tablet  Commonly known as:  PEPTO BISMOL   524 mg, Oral, 4 Times Daily      budesonide-formoterol 160-4.5 MCG/ACT inhaler  Commonly known as:  SYMBICORT   2 puffs, Inhalation, 2 Times Daily - RT       clindamycin 300 MG capsule  Commonly known as:  CLEOCIN   300 mg, Oral, Every 8 Hours Scheduled      gabapentin 100 MG capsule  Commonly known as:  NEURONTIN   100 mg, Oral, 2 Times Daily      glucose blood test strip  Commonly known as:  ACCU-CHEK GUIDE   1 each, Other, 4 Times Daily Before Meals & Nightly, Dx: E10.65. Use as instructed      Insulin Glargine 100 UNIT/ML injection pen  Commonly known as:  LANTUS SOLOSTAR   20 Units, Subcutaneous, Daily      Insulin Lispro (1 Unit Dial) 100 UNIT/ML solution pen-injector  Commonly known as:  HUMALOG KWIKPEN   6 Units, Subcutaneous, 3 Times Daily With Meals      insulin lispro 100 UNIT/ML injection  Commonly known as:  humaLOG   0-7 Units, Subcutaneous, 3 Times Daily With Meals      Ketone Test strip   1 each, In Vitro, As Needed, Dx: E10.65      levothyroxine 25 MCG tablet  Commonly known as:  SYNTHROID, LEVOTHROID   25 mcg, Oral, Daily      Pen Needles 32G X 4 MM misc   1 each, Does not apply, 4 Times Daily Before Meals & Nightly      saccharomyces boulardii 250 MG capsule  Commonly known as:  FLORASTOR   250 mg, Oral, 2 Times Daily      THERA tablet tablet   1 tablet, Oral, Daily   Start Date:  March 8, 2020     tiotropium 18 MCG per inhalation capsule  Commonly known as:  SPIRIVA HANDIHALER   1 capsule, Inhalation, Daily - RT         Stop These Medications    ibuprofen 200 MG tablet  Commonly known as:  ADVIL,MOTRIN            Allergies   Allergen Reactions   • Penicillins Unknown - High Severity         Discharge Disposition:  Skilled Nursing Facility (DC - External)    Diet:  Hospital:  Diet Order   Procedures   • Diet Diabetic/Consistent Carbs; Diabetic - Consistent Carb         Discharge Activity: as tolerated        CODE STATUS:    Code Status and Medical Interventions:   Ordered at: 03/01/20 5403     Level Of Support Discussed With:    Patient     Code Status:    CPR     Medical Interventions (Level of Support Prior to Arrest):    Full         Follow-up  Appointments  No future appointments.          Condition on Discharge:      Stable          Electronically signed by sEme Stanton MD, 03/07/20, 1:05 PM.    Time: I spent  35  minutes on this discharge activity which included face-to-face encounter with the patient/reviewing the data in the system/coordination of the care with the nursing staff as well as consultants/documentation/entering orders.      Electronically signed by Esme Trammell MD at 03/07/20 8157

## 2020-07-09 ENCOUNTER — TELEMEDICINE (OUTPATIENT)
Dept: ENDOCRINOLOGY | Facility: CLINIC | Age: 55
End: 2020-07-09

## 2020-07-09 VITALS — WEIGHT: 144 LBS | HEIGHT: 63 IN | BODY MASS INDEX: 25.52 KG/M2

## 2020-07-09 DIAGNOSIS — E03.9 ACQUIRED HYPOTHYROIDISM: ICD-10-CM

## 2020-07-09 DIAGNOSIS — E10.65 TYPE 1 DIABETES MELLITUS WITH HYPERGLYCEMIA (HCC): Primary | ICD-10-CM

## 2020-07-09 PROCEDURE — 99214 OFFICE O/P EST MOD 30 MIN: CPT | Performed by: INTERNAL MEDICINE

## 2020-07-09 RX ORDER — TRAMADOL HYDROCHLORIDE 50 MG/1
50 TABLET ORAL
Status: ON HOLD | COMMUNITY
Start: 2020-06-17 | End: 2020-07-26

## 2020-07-09 RX ORDER — ACETAMINOPHEN 325 MG/1
TABLET ORAL
COMMUNITY
Start: 2020-06-18 | End: 2020-10-27

## 2020-07-09 RX ORDER — MELOXICAM 7.5 MG/1
7.5 TABLET ORAL DAILY
COMMUNITY
Start: 2020-06-09 | End: 2021-03-16

## 2020-07-09 RX ORDER — BACLOFEN 10 MG/1
TABLET ORAL
COMMUNITY
Start: 2020-06-13 | End: 2020-07-09

## 2020-07-09 RX ORDER — FAMOTIDINE 20 MG
1 TABLET ORAL DAILY
COMMUNITY

## 2020-07-09 RX ORDER — LOPERAMIDE HYDROCHLORIDE 2 MG/1
CAPSULE ORAL
COMMUNITY
Start: 2020-04-26 | End: 2020-10-27

## 2020-07-09 RX ORDER — PROMETHAZINE HYDROCHLORIDE 25 MG/1
25 TABLET ORAL EVERY 6 HOURS PRN
COMMUNITY
Start: 2020-06-21 | End: 2020-10-27

## 2020-07-09 RX ORDER — ESCITALOPRAM OXALATE 10 MG/1
10 TABLET ORAL DAILY
COMMUNITY
Start: 2020-06-29 | End: 2021-03-16

## 2020-07-09 RX ORDER — GABAPENTIN 300 MG/1
300 CAPSULE ORAL 4 TIMES DAILY
Status: ON HOLD | COMMUNITY
Start: 2020-06-19 | End: 2020-10-28 | Stop reason: SDUPTHER

## 2020-07-09 NOTE — PROGRESS NOTES
Endocrine Consult Outpatient    Patient Care Team:  Provider, No Known as PCP - General   You have chosen to receive care through a telehealth visit.  Do you consent to use a video/audio connection for your medical care today? Yes.     Chief Complaint: Follow-up type 1 diabetes: Visit conducted through audio and video conference utilizing [employer].     HPI: 54-year-old female with history of type 1 diabetes, hypothyroidism was followed through telehealth.  For type 1 diabetes: She is currently on Lantus 20 units at bedtime with Humalog 6 units with each meal as well as metformin 1000 mg twice a day.  She is currently in the facility.  She is trying to follow the diet.  Her blood sugars are fluctuating.  Hypothyroidism: On levothyroxine supplementation.  Past Medical History:   Diagnosis Date   • Diabetes mellitus type I (CMS/[facility])    • Lupus (CMS/[facility])        Social History     Socioeconomic History   • Marital status: Single     Spouse name: Not on file   • Number of children: Not on file   • Years of education: Not on file   • Highest education level: Not on file   Tobacco Use   • Smoking status: Former Smoker     Packs/day: 0.50   • Smokeless tobacco: Never Used   Substance and Sexual Activity   • Alcohol use: Yes     Alcohol/week: 4.0 standard drinks     Types: 4 Shots of liquor per week     Comment: a day   • Drug use: Never   • Sexual activity: Defer       Family History   Problem Relation Age of Onset   • Thyroid disease Mother    • Cancer Father        Allergies   Allergen Reactions   • Penicillins Unknown - High Severity       ROS:   Constitutional:  Denies fatigue, tiredness.    Eyes:  Denies change in visual acuity   HENT:  Denies nasal congestion or sore throat   Respiratory: denies cough, shortness of breath.   Cardiovascular:  denies chest pain, edema   GI:  Denies abdominal pain, nausea, vomiting.    :  Denies dysuria   Musculoskeletal:  Denies back pain or joint pain   Integument:  Denies dry skin,  rash   Neurologic:  Denies headache, focal weakness or sensory changes   Endocrine:  Denies polyuria or polydipsia   Psychiatric:  Denies depression or anxiety      There were no vitals filed for this visit.     Physical Exam:  GEN: NAD, looked healthy on video.  Alert and oriented and able to carry on conversation.  Breathing effort was normal.  Mood and affect was normal.      Results Review:     I reviewed the patient's new clinical results.    Lab Results   Component Value Date    GLUCOSE 370 (H) 03/07/2020    BUN 7 03/07/2020    CREATININE 0.49 (L) 03/07/2020    EGFRIFNONA 131 03/07/2020    BCR 14.3 03/07/2020    K 4.8 03/07/2020    CO2 25.0 03/07/2020    CALCIUM 8.6 03/07/2020    ALBUMIN 2.90 (L) 03/03/2020    AST 81 (H) 03/03/2020    ALT 62 (H) 03/03/2020    CHOL 98 03/02/2020    TRIG 163 (H) 03/02/2020    HDL 38 (L) 03/02/2020    LDL 27 03/02/2020     Lab Results   Component Value Date    HGBA1C 12.9 (H) 03/02/2020     Lab Results   Component Value Date    CREATININE 0.49 (L) 03/07/2020     Lab Results   Component Value Date    TSH 6.680 (H) 03/03/2020    FREET4 0.73 (L) 03/03/2020    THYROIDAB 17 03/03/2020       Medication Review: Reviewed.       Current Outpatient Medications:   •  ACCU-CHEK FASTCLIX LANCETS misc, 1 each 4 (Four) Times a Day Before Meals & at Bedtime. Dx: E10.65, Disp: 204 each, Rfl: 2  •  acetaminophen (TYLENOL) 325 MG tablet, , Disp: , Rfl:   •  Acetone, Urine, Test (KETONE TEST) strip, 1 each by In Vitro route As Needed (Check for urine ketones when blood sugar is greater than 250 or when sick). Dx: E10.65, Disp: 50 strip, Rfl: 2  •  albuterol sulfate  (90 Base) MCG/ACT inhaler, Inhale 2 puffs Every 4 (Four) Hours As Needed for Wheezing or Shortness of Air., Disp: 2 inhaler, Rfl: 0  •  budesonide-formoterol (SYMBICORT) 160-4.5 MCG/ACT inhaler, Inhale 2 puffs 2 (Two) Times a Day., Disp: 2 inhaler, Rfl: 0  •  escitalopram (LEXAPRO) 10 MG tablet, , Disp: , Rfl:   •  gabapentin  (NEURONTIN) 300 MG capsule, , Disp: , Rfl:   •  glucose blood (ACCU-CHEK GUIDE) test strip, 1 each by Other route 4 (Four) Times a Day Before Meals & at Bedtime. Dx: E10.65. Use as instructed, Disp: 150 each, Rfl: 2  •  Insulin Glargine (LANTUS SOLOSTAR) 100 UNIT/ML injection pen, Inject 20 Units under the skin into the appropriate area as directed Daily., Disp: 30 pen, Rfl: 0  •  insulin lispro (humaLOG) 100 UNIT/ML injection, Inject 0-7 Units under the skin into the appropriate area as directed 3 (Three) Times a Day With Meals. (Patient taking differently: Inject 6 Units under the skin into the appropriate area as directed 3 (Three) Times a Day With Meals. Sliding scale), Disp: 20 mL, Rfl: 12  •  Insulin Pen Needle (PEN NEEDLES) 32G X 4 MM misc, 1 each 4 (Four) Times a Day Before Meals & at Bedtime., Disp: 200 each, Rfl: 2  •  levothyroxine (SYNTHROID, LEVOTHROID) 25 MCG tablet, Take 1 tablet by mouth Daily., Disp: 30 tablet, Rfl: 0  •  loperamide (IMODIUM) 2 MG capsule, , Disp: , Rfl:   •  meloxicam (MOBIC) 7.5 MG tablet, , Disp: , Rfl:   •  metFORMIN (GLUCOPHAGE) 1000 MG tablet, Take 1,000 mg by mouth 2 (Two) Times a Day With Meals., Disp: , Rfl:   •  Multiple Vitamin (THERA) tablet tablet, Take 1 tablet by mouth Daily., Disp: 30 tablet, Rfl: 0  •  promethazine (PHENERGAN) 25 MG tablet, , Disp: , Rfl:   •  tiotropium (SPIRIVA HANDIHALER) 18 MCG per inhalation capsule, Place 1 capsule into inhaler and inhale Daily., Disp: 30 capsule, Rfl: 0  •  traMADol (ULTRAM) 50 MG tablet, , Disp: , Rfl:   •  Vitamin D, Cholecalciferol, 25 MCG (1000 UT) capsule, Take  by mouth., Disp: , Rfl:   •  WIXELA INHUB 100-50 MCG/DOSE DISKUS, , Disp: , Rfl:     Assessment/Plan   1.  Diabetes mellitus type 1: Uncontrolled with fluctuating blood sugars.  At this time I have asked her to DC metformin, continue Lantus 20 at bedtime with Humalog 6 with each meal along with sliding scale.  She is advised to always keep glucose source in case  of low blood sugar and will follow blood sugars and A1c.  She tells me her A1c was done at the facility recently was 6.7%.    2.  Hypothyroidism: On levothyroxine supplementation, no labs available at this time.  Will check TSH and free T4 and then make further recommendations.                    José Miguel Regalado MD FACE.

## 2020-07-09 NOTE — PATIENT INSTRUCTIONS
DC metformin  Always keep glucose source in case of low blood sugar  Please check TSH and free T4 in next few days  Check A1c, CMP, lipid panel and urine microalbumin creatinine ratio before follow-up in 3 months  Always get regular eye exam  Follow-up in 3 to 4 months.

## 2020-07-16 ENCOUNTER — HOSPITAL ENCOUNTER (EMERGENCY)
Facility: HOSPITAL | Age: 55
Discharge: HOME | End: 2020-07-16
Admitting: EMERGENCY MEDICINE

## 2020-07-16 ENCOUNTER — APPOINTMENT (OUTPATIENT)
Dept: GENERAL RADIOLOGY | Facility: HOSPITAL | Age: 55
End: 2020-07-16

## 2020-07-16 VITALS
OXYGEN SATURATION: 99 % | WEIGHT: 145 LBS | DIASTOLIC BLOOD PRESSURE: 80 MMHG | SYSTOLIC BLOOD PRESSURE: 114 MMHG | TEMPERATURE: 98.7 F | HEIGHT: 62 IN | RESPIRATION RATE: 15 BRPM | BODY MASS INDEX: 26.68 KG/M2 | HEART RATE: 111 BPM

## 2020-07-16 DIAGNOSIS — S82.832A CLOSED FRACTURE OF DISTAL END OF LEFT FIBULA, UNSPECIFIED FRACTURE MORPHOLOGY, INITIAL ENCOUNTER: ICD-10-CM

## 2020-07-16 DIAGNOSIS — M25.572 ACUTE LEFT ANKLE PAIN: Primary | ICD-10-CM
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
## 2020-07-16 PROCEDURE — 73610 X-RAY EXAM OF ANKLE: CPT

## 2020-07-16 PROCEDURE — 63710000001 ONDANSETRON ODT 4 MG TABLET DISPERSIBLE: Performed by: NURSE PRACTITIONER

## 2020-07-16 PROCEDURE — 99284 EMERGENCY DEPT VISIT MOD MDM: CPT

## 2020-07-16 PROCEDURE — 96372 THER/PROPH/DIAG INJ SC/IM: CPT

## 2020-07-16 PROCEDURE — 25010000002 MORPHINE PER 10 MG: Performed by: NURSE PRACTITIONER

## 2020-07-16 RX ORDER — HYDROCODONE BITARTRATE AND ACETAMINOPHEN 7.5; 325 MG/1; MG/1
1-2 TABLET ORAL EVERY 6 HOURS PRN
Qty: 15 TABLET | Refills: 0 | Status: SHIPPED | OUTPATIENT
Start: 2020-07-16 | End: 2020-10-27

## 2020-07-16 RX ORDER — MORPHINE SULFATE 4 MG/ML
8 INJECTION, SOLUTION INTRAMUSCULAR; INTRAVENOUS ONCE
Status: COMPLETED | OUTPATIENT
Start: 2020-07-16 | End: 2020-07-16

## 2020-07-16 RX ORDER — ONDANSETRON 4 MG/1
4 TABLET, ORALLY DISINTEGRATING ORAL ONCE
Status: COMPLETED | OUTPATIENT
Start: 2020-07-16 | End: 2020-07-16

## 2020-07-16 RX ADMIN — ONDANSETRON 4 MG: 4 TABLET, ORALLY DISINTEGRATING ORAL at 02:18

## 2020-07-16 RX ADMIN — MORPHINE SULFATE 8 MG: 4 INJECTION INTRAVENOUS at 02:20

## 2020-07-16 NOTE — ED NOTES
"Patient stated she got home from a nursing home 2 days ago. Patient stated she fell from her wheelchair transferring to the toilet last night at 2000. Patient stated she heard a \"pop\" from her left ankle when she fell. Patient stated \"I haven't been able to bear any weight on it or flex my foot.\"     Chi Adler, RN  07/16/20 0217    "

## 2020-07-16 NOTE — ED PROVIDER NOTES
Subjective   Patient is a 54-year-old female who states she has been wheelchair-bound for the last year due to neuropathy and lower leg weakness.  She states that she was standing to transfer at about 830 this evening when she and her legs gave out and she fell twisting her left ankle.  There is a large amount of soft tissue swelling laterally and pain on palpation.-Patient rates her pain an 8/10 she states it radiates into the calf she has no numbness or tingling          Review of Systems   Constitutional: Negative for chills, fatigue and fever.   HENT: Negative for congestion, tinnitus and trouble swallowing.    Eyes: Negative for photophobia, discharge and redness.   Respiratory: Negative for cough and shortness of breath.    Cardiovascular: Negative for chest pain and palpitations.   Gastrointestinal: Negative for abdominal pain, diarrhea, nausea and vomiting.   Genitourinary: Negative for dysuria, frequency and urgency.   Musculoskeletal: Positive for gait problem and joint swelling. Negative for back pain and myalgias.        Left ankle pain   Skin: Negative for rash.   Neurological: Negative for dizziness and headaches.   Psychiatric/Behavioral: Negative for confusion.   All other systems reviewed and are negative.      Past Medical History:   Diagnosis Date   • Diabetes mellitus type I (CMS/HCC)    • Lupus (CMS/HCC)        Allergies   Allergen Reactions   • Penicillins Unknown - High Severity       Past Surgical History:   Procedure Laterality Date   •  SECTION     • CHOLECYSTECTOMY     • GALLBLADDER SURGERY     • HYSTERECTOMY         Family History   Problem Relation Age of Onset   • Thyroid disease Mother    • Cancer Father        Social History     Socioeconomic History   • Marital status: Single     Spouse name: Not on file   • Number of children: Not on file   • Years of education: Not on file   • Highest education level: Not on file   Tobacco Use   • Smoking status: Former Smoker      "Packs/day: 0.50   • Smokeless tobacco: Never Used   Substance and Sexual Activity   • Alcohol use: Yes     Alcohol/week: 4.0 standard drinks     Types: 4 Shots of liquor per week     Comment: a day   • Drug use: Never   • Sexual activity: Defer           Objective   Physical Exam   Constitutional: She is oriented to person, place, and time. She appears well-developed and well-nourished.   HENT:   Head: Normocephalic and atraumatic.   Eyes: Pupils are equal, round, and reactive to light. Conjunctivae and EOM are normal.   Neck: Normal range of motion. Neck supple.   Cardiovascular: Normal rate, regular rhythm, normal heart sounds and intact distal pulses.   Pulmonary/Chest: Effort normal and breath sounds normal. No respiratory distress. She has no wheezes.   Abdominal: She exhibits no distension and no mass. There is no tenderness. There is no rebound and no guarding.   Musculoskeletal: Normal range of motion. She exhibits no deformity.        Neurological: She is alert and oriented to person, place, and time. She displays normal reflexes. No cranial nerve deficit or sensory deficit. GCS eye subscore is 4. GCS verbal subscore is 5. GCS motor subscore is 6.   Skin: Skin is warm and dry. Capillary refill takes less than 2 seconds.   Psychiatric: Her speech is normal and behavior is normal. Judgment and thought content normal. Her mood appears anxious. Cognition and memory are normal.   Vitals reviewed.      Procedures           ED Course       /73 (BP Location: Left arm, Patient Position: Lying)   Pulse 109   Temp 99.3 °F (37.4 °C) (Oral)   Resp 17   Ht 157.5 cm (62\")   Wt 65.8 kg (145 lb)   SpO2 100%   BMI 26.52 kg/m²   Labs Reviewed - No data to display  Medications   Morphine sulfate (PF) injection 8 mg (8 mg Subcutaneous Given 7/16/20 0220)   ondansetron ODT (ZOFRAN-ODT) disintegrating tablet 4 mg (4 mg Oral Given 7/16/20 0218)     Left ankle xray- distal fibula fracture..Non displaced.                "                       MDM  Number of Diagnoses or Management Options  Acute left ankle pain:   Closed fracture of distal end of left fibula, unspecified fracture morphology, initial encounter:   Fall, initial encounter:   Diagnosis management comments: Patient had above exam and x-ray was obtained reviewed and discussed with Dr. Britt patient has a distal fibula fracture nondisplaced--patient had tall cam walker placed and was distally neurovascular intact after placement.    Will be sent home with some [place]     Patient Progress  Patient progress: improved      Final diagnoses:   Acute left ankle pain   Fall, initial encounter   Closed fracture of distal end of left fibula, unspecified fracture morphology, initial encounter            Anahi Green, APRN  07/16/20 0245

## 2020-07-16 NOTE — DISCHARGE INSTRUCTIONS
Wear cam walker until seen by the orthopedic doctor call tomorrow for follow-up appointment    Use Norco for pain in place of tramadol    May bear weight tolerated..

## 2020-07-25 ENCOUNTER — HOSPITAL ENCOUNTER (OUTPATIENT)
Facility: HOSPITAL | Age: 55
Setting detail: OBSERVATION
Discharge: HOME HEALTH | End: 2020-07-27
Attending: EMERGENCY MEDICINE | Admitting: HOSPITALIST

## 2020-07-25 DIAGNOSIS — R41.82 ALTERED MENTAL STATUS, UNSPECIFIED ALTERED MENTAL STATUS TYPE: Primary | ICD-10-CM

## 2020-07-25 LAB
D-LACTATE SERPL-SCNC: 1.5 MMOL/L (ref 0.5–2)
GLUCOSE BLDC GLUCOMTR-MCNC: 216 MG/DL (ref 70–105)

## 2020-07-25 PROCEDURE — 80307 DRUG TEST PRSMV CHEM ANLYZR: CPT | Performed by: EMERGENCY MEDICINE

## 2020-07-25 PROCEDURE — 93005 ELECTROCARDIOGRAM TRACING: CPT | Performed by: EMERGENCY MEDICINE

## 2020-07-25 PROCEDURE — C9803 HOPD COVID-19 SPEC COLLECT: HCPCS

## 2020-07-25 PROCEDURE — 87150 DNA/RNA AMPLIFIED PROBE: CPT | Performed by: EMERGENCY MEDICINE

## 2020-07-25 PROCEDURE — 81003 URINALYSIS AUTO W/O SCOPE: CPT | Performed by: EMERGENCY MEDICINE

## 2020-07-25 PROCEDURE — 82140 ASSAY OF AMMONIA: CPT | Performed by: EMERGENCY MEDICINE

## 2020-07-25 PROCEDURE — 80053 COMPREHEN METABOLIC PANEL: CPT | Performed by: EMERGENCY MEDICINE

## 2020-07-25 PROCEDURE — 99285 EMERGENCY DEPT VISIT HI MDM: CPT

## 2020-07-25 PROCEDURE — 82009 KETONE BODYS QUAL: CPT | Performed by: EMERGENCY MEDICINE

## 2020-07-25 PROCEDURE — 87635 SARS-COV-2 COVID-19 AMP PRB: CPT | Performed by: EMERGENCY MEDICINE

## 2020-07-25 PROCEDURE — 85610 PROTHROMBIN TIME: CPT | Performed by: EMERGENCY MEDICINE

## 2020-07-25 PROCEDURE — P9612 CATHETERIZE FOR URINE SPEC: HCPCS

## 2020-07-25 PROCEDURE — 85730 THROMBOPLASTIN TIME PARTIAL: CPT | Performed by: EMERGENCY MEDICINE

## 2020-07-25 PROCEDURE — 87147 CULTURE TYPE IMMUNOLOGIC: CPT | Performed by: EMERGENCY MEDICINE

## 2020-07-25 PROCEDURE — 83605 ASSAY OF LACTIC ACID: CPT

## 2020-07-25 PROCEDURE — 85025 COMPLETE CBC W/AUTO DIFF WBC: CPT | Performed by: EMERGENCY MEDICINE

## 2020-07-25 PROCEDURE — 87040 BLOOD CULTURE FOR BACTERIA: CPT | Performed by: EMERGENCY MEDICINE

## 2020-07-25 PROCEDURE — 82962 GLUCOSE BLOOD TEST: CPT

## 2020-07-25 PROCEDURE — 84439 ASSAY OF FREE THYROXINE: CPT | Performed by: PHYSICIAN ASSISTANT

## 2020-07-25 RX ORDER — ONDANSETRON 2 MG/ML
4 INJECTION INTRAMUSCULAR; INTRAVENOUS ONCE
Status: COMPLETED | OUTPATIENT
Start: 2020-07-25 | End: 2020-07-26

## 2020-07-26 ENCOUNTER — APPOINTMENT (OUTPATIENT)
Dept: MRI IMAGING | Facility: HOSPITAL | Age: 55
End: 2020-07-26

## 2020-07-26 ENCOUNTER — APPOINTMENT (OUTPATIENT)
Dept: CT IMAGING | Facility: HOSPITAL | Age: 55
End: 2020-07-26

## 2020-07-26 PROBLEM — J44.9 COPD (CHRONIC OBSTRUCTIVE PULMONARY DISEASE): Chronic | Status: ACTIVE | Noted: 2020-07-26

## 2020-07-26 PROBLEM — N17.9 AKI (ACUTE KIDNEY INJURY): Status: ACTIVE | Noted: 2020-07-26

## 2020-07-26 PROBLEM — E11.9 DIABETES (HCC): Chronic | Status: ACTIVE | Noted: 2020-07-26

## 2020-07-26 PROBLEM — F32.A DEPRESSION: Chronic | Status: ACTIVE | Noted: 2020-07-26

## 2020-07-26 PROBLEM — R41.82 ALTERED MENTAL STATUS: Status: ACTIVE | Noted: 2020-07-26

## 2020-07-26 LAB
ACETONE BLD QL: NEGATIVE
ALBUMIN SERPL-MCNC: 4.3 G/DL (ref 3.5–5.2)
ALBUMIN/GLOB SERPL: 1.3 G/DL
ALP SERPL-CCNC: 132 U/L (ref 39–117)
ALT SERPL W P-5'-P-CCNC: 30 U/L (ref 1–33)
AMMONIA BLD-SCNC: 21 UMOL/L (ref 11–51)
AMPHET+METHAMPHET UR QL: NEGATIVE
ANION GAP SERPL CALCULATED.3IONS-SCNC: 13 MMOL/L (ref 5–15)
ANION GAP SERPL CALCULATED.3IONS-SCNC: 15 MMOL/L (ref 5–15)
APAP SERPL-MCNC: <5 MCG/ML (ref 10–30)
APTT PPP: 24.4 SECONDS (ref 24–31)
AST SERPL-CCNC: 17 U/L (ref 1–32)
BARBITURATES UR QL SCN: NEGATIVE
BASOPHILS # BLD AUTO: 0.1 10*3/MM3 (ref 0–0.2)
BASOPHILS # BLD AUTO: 0.1 10*3/MM3 (ref 0–0.2)
BASOPHILS NFR BLD AUTO: 0.7 % (ref 0–1.5)
BASOPHILS NFR BLD AUTO: 1.1 % (ref 0–1.5)
BENZODIAZ UR QL SCN: NEGATIVE
BILIRUB SERPL-MCNC: <0.2 MG/DL (ref 0–1.2)
BILIRUB UR QL STRIP: NEGATIVE
BUN SERPL-MCNC: 15 MG/DL (ref 6–20)
BUN SERPL-MCNC: 21 MG/DL (ref 6–20)
BUN SERPL-MCNC: ABNORMAL MG/DL
BUN SERPL-MCNC: ABNORMAL MG/DL
BUN/CREAT SERPL: ABNORMAL
BUN/CREAT SERPL: ABNORMAL
CALCIUM SPEC-SCNC: 9.6 MG/DL (ref 8.6–10.5)
CALCIUM SPEC-SCNC: 9.7 MG/DL (ref 8.6–10.5)
CANNABINOIDS SERPL QL: NEGATIVE
CHLORIDE SERPL-SCNC: 104 MMOL/L (ref 98–107)
CHLORIDE SERPL-SCNC: 106 MMOL/L (ref 98–107)
CHOLEST SERPL-MCNC: 197 MG/DL (ref 0–200)
CK SERPL-CCNC: 134 U/L (ref 20–180)
CLARITY UR: CLEAR
CO2 SERPL-SCNC: 25 MMOL/L (ref 22–29)
CO2 SERPL-SCNC: 28 MMOL/L (ref 22–29)
COCAINE UR QL: NEGATIVE
COLOR UR: YELLOW
CREAT SERPL-MCNC: 0.7 MG/DL (ref 0.57–1)
CREAT SERPL-MCNC: 1.05 MG/DL (ref 0.57–1)
DEPRECATED RDW RBC AUTO: 43.3 FL (ref 37–54)
DEPRECATED RDW RBC AUTO: 43.8 FL (ref 37–54)
EOSINOPHIL # BLD AUTO: 0.3 10*3/MM3 (ref 0–0.4)
EOSINOPHIL # BLD AUTO: 0.5 10*3/MM3 (ref 0–0.4)
EOSINOPHIL NFR BLD AUTO: 2.5 % (ref 0.3–6.2)
EOSINOPHIL NFR BLD AUTO: 4.5 % (ref 0.3–6.2)
ERYTHROCYTE [DISTWIDTH] IN BLOOD BY AUTOMATED COUNT: 13.1 % (ref 12.3–15.4)
ERYTHROCYTE [DISTWIDTH] IN BLOOD BY AUTOMATED COUNT: 13.2 % (ref 12.3–15.4)
ETHANOL UR QL: <0.01 %
FOLATE SERPL-MCNC: 17.5 NG/ML (ref 4.78–24.2)
GFR SERPL CREATININE-BSD FRML MDRD: 54 ML/MIN/1.73
GFR SERPL CREATININE-BSD FRML MDRD: 87 ML/MIN/1.73
GLOBULIN UR ELPH-MCNC: 3.3 GM/DL
GLUCOSE BLDC GLUCOMTR-MCNC: 119 MG/DL (ref 70–105)
GLUCOSE BLDC GLUCOMTR-MCNC: 173 MG/DL (ref 70–105)
GLUCOSE BLDC GLUCOMTR-MCNC: 187 MG/DL (ref 70–105)
GLUCOSE SERPL-MCNC: 175 MG/DL (ref 65–99)
GLUCOSE SERPL-MCNC: 214 MG/DL (ref 65–99)
GLUCOSE UR STRIP-MCNC: NEGATIVE MG/DL
HCT VFR BLD AUTO: 36.9 % (ref 34–46.6)
HCT VFR BLD AUTO: 37.1 % (ref 34–46.6)
HDLC SERPL-MCNC: 40 MG/DL (ref 40–60)
HGB BLD-MCNC: 12.3 G/DL (ref 12–15.9)
HGB BLD-MCNC: 12.4 G/DL (ref 12–15.9)
HGB UR QL STRIP.AUTO: NEGATIVE
HOLD SPECIMEN: NORMAL
INR PPP: 0.91 (ref 0.9–1.1)
KETONES UR QL STRIP: NEGATIVE
LDLC SERPL CALC-MCNC: 109 MG/DL (ref 0–100)
LDLC/HDLC SERPL: 2.74 {RATIO}
LEUKOCYTE ESTERASE UR QL STRIP.AUTO: NEGATIVE
LYMPHOCYTES # BLD AUTO: 2.5 10*3/MM3 (ref 0.7–3.1)
LYMPHOCYTES # BLD AUTO: 2.6 10*3/MM3 (ref 0.7–3.1)
LYMPHOCYTES NFR BLD AUTO: 23.4 % (ref 19.6–45.3)
LYMPHOCYTES NFR BLD AUTO: 24.4 % (ref 19.6–45.3)
MAGNESIUM SERPL-MCNC: 1.9 MG/DL (ref 1.6–2.6)
MCH RBC QN AUTO: 31.7 PG (ref 26.6–33)
MCH RBC QN AUTO: 32.1 PG (ref 26.6–33)
MCHC RBC AUTO-ENTMCNC: 33.3 G/DL (ref 31.5–35.7)
MCHC RBC AUTO-ENTMCNC: 33.6 G/DL (ref 31.5–35.7)
MCV RBC AUTO: 95.2 FL (ref 79–97)
MCV RBC AUTO: 95.4 FL (ref 79–97)
METHADONE UR QL SCN: NEGATIVE
MONOCYTES # BLD AUTO: 0.5 10*3/MM3 (ref 0.1–0.9)
MONOCYTES # BLD AUTO: 0.5 10*3/MM3 (ref 0.1–0.9)
MONOCYTES NFR BLD AUTO: 4.5 % (ref 5–12)
MONOCYTES NFR BLD AUTO: 4.6 % (ref 5–12)
NEUTROPHILS NFR BLD AUTO: 6.9 10*3/MM3 (ref 1.7–7)
NEUTROPHILS NFR BLD AUTO: 65.9 % (ref 42.7–76)
NEUTROPHILS NFR BLD AUTO: 68.4 % (ref 42.7–76)
NEUTROPHILS NFR BLD AUTO: 7.2 10*3/MM3 (ref 1.7–7)
NITRITE UR QL STRIP: NEGATIVE
NRBC BLD AUTO-RTO: 0 /100 WBC (ref 0–0.2)
NRBC BLD AUTO-RTO: 0 /100 WBC (ref 0–0.2)
OPIATES UR QL: POSITIVE
OXYCODONE UR QL SCN: NEGATIVE
PH UR STRIP.AUTO: 5.5 [PH] (ref 5–8)
PHOSPHATE SERPL-MCNC: 4.4 MG/DL (ref 2.5–4.5)
PLATELET # BLD AUTO: 449 10*3/MM3 (ref 140–450)
PLATELET # BLD AUTO: 466 10*3/MM3 (ref 140–450)
PMV BLD AUTO: 8.9 FL (ref 6–12)
PMV BLD AUTO: 9 FL (ref 6–12)
POTASSIUM SERPL-SCNC: 4.1 MMOL/L (ref 3.5–5.2)
POTASSIUM SERPL-SCNC: 4.2 MMOL/L (ref 3.5–5.2)
PROT SERPL-MCNC: 7.6 G/DL (ref 6–8.5)
PROT UR QL STRIP: NEGATIVE
PROTHROMBIN TIME: 9.7 SECONDS (ref 9.6–11.7)
RBC # BLD AUTO: 3.87 10*6/MM3 (ref 3.77–5.28)
RBC # BLD AUTO: 3.89 10*6/MM3 (ref 3.77–5.28)
SALICYLATES SERPL-MCNC: <0.3 MG/DL
SARS-COV-2 RNA PNL SPEC NAA+PROBE: NOT DETECTED
SODIUM SERPL-SCNC: 144 MMOL/L (ref 136–145)
SODIUM SERPL-SCNC: 147 MMOL/L (ref 136–145)
SP GR UR STRIP: 1.03 (ref 1–1.03)
T4 FREE SERPL-MCNC: 1.1 NG/DL (ref 0.93–1.7)
TRIGL SERPL-MCNC: 238 MG/DL (ref 0–150)
TSH SERPL DL<=0.05 MIU/L-ACNC: 1.39 UIU/ML (ref 0.27–4.2)
UROBILINOGEN UR QL STRIP: NORMAL
VIT B12 BLD-MCNC: 1630 PG/ML (ref 211–946)
VLDLC SERPL-MCNC: 47.6 MG/DL
WBC # BLD AUTO: 10.5 10*3/MM3 (ref 3.4–10.8)
WBC # BLD AUTO: 10.5 10*3/MM3 (ref 3.4–10.8)

## 2020-07-26 PROCEDURE — 70450 CT HEAD/BRAIN W/O DYE: CPT

## 2020-07-26 PROCEDURE — 96361 HYDRATE IV INFUSION ADD-ON: CPT

## 2020-07-26 PROCEDURE — G0378 HOSPITAL OBSERVATION PER HR: HCPCS

## 2020-07-26 PROCEDURE — 25010000002 ONDANSETRON PER 1 MG: Performed by: EMERGENCY MEDICINE

## 2020-07-26 PROCEDURE — 94799 UNLISTED PULMONARY SVC/PX: CPT

## 2020-07-26 PROCEDURE — 96375 TX/PRO/DX INJ NEW DRUG ADDON: CPT

## 2020-07-26 PROCEDURE — 99220 PR INITIAL OBSERVATION CARE/DAY 70 MINUTES: CPT | Performed by: HOSPITALIST

## 2020-07-26 PROCEDURE — 82607 VITAMIN B-12: CPT | Performed by: PHYSICIAN ASSISTANT

## 2020-07-26 PROCEDURE — 92610 EVALUATE SWALLOWING FUNCTION: CPT

## 2020-07-26 PROCEDURE — 25010000002 ONDANSETRON PER 1 MG

## 2020-07-26 PROCEDURE — 83735 ASSAY OF MAGNESIUM: CPT | Performed by: PHYSICIAN ASSISTANT

## 2020-07-26 PROCEDURE — 82746 ASSAY OF FOLIC ACID SERUM: CPT | Performed by: PHYSICIAN ASSISTANT

## 2020-07-26 PROCEDURE — 96376 TX/PRO/DX INJ SAME DRUG ADON: CPT

## 2020-07-26 PROCEDURE — 96365 THER/PROPH/DIAG IV INF INIT: CPT

## 2020-07-26 PROCEDURE — 63710000001 INSULIN LISPRO (HUMAN) PER 5 UNITS: Performed by: PHYSICIAN ASSISTANT

## 2020-07-26 PROCEDURE — 80061 LIPID PANEL: CPT | Performed by: PHYSICIAN ASSISTANT

## 2020-07-26 PROCEDURE — 84443 ASSAY THYROID STIM HORMONE: CPT | Performed by: PHYSICIAN ASSISTANT

## 2020-07-26 PROCEDURE — 85025 COMPLETE CBC W/AUTO DIFF WBC: CPT | Performed by: PHYSICIAN ASSISTANT

## 2020-07-26 PROCEDURE — 70551 MRI BRAIN STEM W/O DYE: CPT

## 2020-07-26 PROCEDURE — 80048 BASIC METABOLIC PNL TOTAL CA: CPT | Performed by: PHYSICIAN ASSISTANT

## 2020-07-26 PROCEDURE — 84100 ASSAY OF PHOSPHORUS: CPT | Performed by: PHYSICIAN ASSISTANT

## 2020-07-26 PROCEDURE — 82962 GLUCOSE BLOOD TEST: CPT

## 2020-07-26 PROCEDURE — 25010000002 THIAMINE PER 100 MG: Performed by: PHYSICIAN ASSISTANT

## 2020-07-26 PROCEDURE — 83036 HEMOGLOBIN GLYCOSYLATED A1C: CPT | Performed by: PHYSICIAN ASSISTANT

## 2020-07-26 PROCEDURE — 82550 ASSAY OF CK (CPK): CPT | Performed by: PHYSICIAN ASSISTANT

## 2020-07-26 PROCEDURE — 25010000002 LORAZEPAM PER 2 MG: Performed by: PHYSICIAN ASSISTANT

## 2020-07-26 RX ORDER — LORAZEPAM 1 MG/1
1 TABLET ORAL
Status: DISCONTINUED | OUTPATIENT
Start: 2020-07-26 | End: 2020-07-27 | Stop reason: HOSPADM

## 2020-07-26 RX ORDER — ONDANSETRON 4 MG/1
4 TABLET, FILM COATED ORAL EVERY 6 HOURS PRN
Status: DISCONTINUED | OUTPATIENT
Start: 2020-07-26 | End: 2020-07-27 | Stop reason: HOSPADM

## 2020-07-26 RX ORDER — LOPERAMIDE HYDROCHLORIDE 2 MG/1
2 CAPSULE ORAL 3 TIMES DAILY PRN
Status: DISCONTINUED | OUTPATIENT
Start: 2020-07-26 | End: 2020-07-27 | Stop reason: HOSPADM

## 2020-07-26 RX ORDER — ACETAMINOPHEN 325 MG/1
650 TABLET ORAL EVERY 4 HOURS PRN
Status: DISCONTINUED | OUTPATIENT
Start: 2020-07-26 | End: 2020-07-27 | Stop reason: HOSPADM

## 2020-07-26 RX ORDER — DOCUSATE SODIUM 100 MG/1
100 CAPSULE, LIQUID FILLED ORAL 2 TIMES DAILY PRN
Status: DISCONTINUED | OUTPATIENT
Start: 2020-07-26 | End: 2020-07-27 | Stop reason: HOSPADM

## 2020-07-26 RX ORDER — POTASSIUM CHLORIDE 1.5 G/1.77G
40 POWDER, FOR SOLUTION ORAL AS NEEDED
Status: DISCONTINUED | OUTPATIENT
Start: 2020-07-26 | End: 2020-07-27 | Stop reason: HOSPADM

## 2020-07-26 RX ORDER — NICOTINE 21 MG/24HR
1 PATCH, TRANSDERMAL 24 HOURS TRANSDERMAL DAILY
Status: DISCONTINUED | OUTPATIENT
Start: 2020-07-26 | End: 2020-07-27 | Stop reason: HOSPADM

## 2020-07-26 RX ORDER — DEXTROSE MONOHYDRATE 25 G/50ML
25 INJECTION, SOLUTION INTRAVENOUS
Status: DISCONTINUED | OUTPATIENT
Start: 2020-07-26 | End: 2020-07-27 | Stop reason: HOSPADM

## 2020-07-26 RX ORDER — PROMETHAZINE HYDROCHLORIDE 25 MG/1
25 TABLET ORAL EVERY 8 HOURS PRN
Status: DISCONTINUED | OUTPATIENT
Start: 2020-07-26 | End: 2020-07-27 | Stop reason: HOSPADM

## 2020-07-26 RX ORDER — ONDANSETRON 2 MG/ML
4 INJECTION INTRAMUSCULAR; INTRAVENOUS ONCE
Status: COMPLETED | OUTPATIENT
Start: 2020-07-26 | End: 2020-07-26

## 2020-07-26 RX ORDER — LORAZEPAM 2 MG/ML
2 INJECTION INTRAMUSCULAR
Status: DISCONTINUED | OUTPATIENT
Start: 2020-07-26 | End: 2020-07-27 | Stop reason: HOSPADM

## 2020-07-26 RX ORDER — ONDANSETRON 2 MG/ML
4 INJECTION INTRAMUSCULAR; INTRAVENOUS EVERY 6 HOURS PRN
Status: DISCONTINUED | OUTPATIENT
Start: 2020-07-26 | End: 2020-07-27 | Stop reason: HOSPADM

## 2020-07-26 RX ORDER — CALCIUM GLUCONATE 20 MG/ML
1 INJECTION, SOLUTION INTRAVENOUS AS NEEDED
Status: DISCONTINUED | OUTPATIENT
Start: 2020-07-26 | End: 2020-07-27 | Stop reason: HOSPADM

## 2020-07-26 RX ORDER — CALCIUM GLUCONATE 20 MG/ML
2 INJECTION, SOLUTION INTRAVENOUS AS NEEDED
Status: DISCONTINUED | OUTPATIENT
Start: 2020-07-26 | End: 2020-07-27 | Stop reason: HOSPADM

## 2020-07-26 RX ORDER — ONDANSETRON 2 MG/ML
INJECTION INTRAMUSCULAR; INTRAVENOUS
Status: COMPLETED
Start: 2020-07-26 | End: 2020-07-26

## 2020-07-26 RX ORDER — POTASSIUM CHLORIDE 20 MEQ/1
40 TABLET, EXTENDED RELEASE ORAL AS NEEDED
Status: DISCONTINUED | OUTPATIENT
Start: 2020-07-26 | End: 2020-07-27 | Stop reason: HOSPADM

## 2020-07-26 RX ORDER — SODIUM CHLORIDE 0.9 % (FLUSH) 0.9 %
10 SYRINGE (ML) INJECTION EVERY 12 HOURS SCHEDULED
Status: DISCONTINUED | OUTPATIENT
Start: 2020-07-26 | End: 2020-07-27 | Stop reason: HOSPADM

## 2020-07-26 RX ORDER — MAGNESIUM SULFATE HEPTAHYDRATE 40 MG/ML
2 INJECTION, SOLUTION INTRAVENOUS AS NEEDED
Status: DISCONTINUED | OUTPATIENT
Start: 2020-07-26 | End: 2020-07-27 | Stop reason: HOSPADM

## 2020-07-26 RX ORDER — NITROGLYCERIN 0.4 MG/1
0.4 TABLET SUBLINGUAL
Status: DISCONTINUED | OUTPATIENT
Start: 2020-07-26 | End: 2020-07-27 | Stop reason: HOSPADM

## 2020-07-26 RX ORDER — ACETAMINOPHEN 160 MG/5ML
650 SOLUTION ORAL EVERY 4 HOURS PRN
Status: DISCONTINUED | OUTPATIENT
Start: 2020-07-26 | End: 2020-07-27 | Stop reason: HOSPADM

## 2020-07-26 RX ORDER — SODIUM CHLORIDE 0.9 % (FLUSH) 0.9 %
10 SYRINGE (ML) INJECTION AS NEEDED
Status: DISCONTINUED | OUTPATIENT
Start: 2020-07-26 | End: 2020-07-27 | Stop reason: HOSPADM

## 2020-07-26 RX ORDER — CALCIUM CARBONATE 200(500)MG
2 TABLET,CHEWABLE ORAL 2 TIMES DAILY PRN
Status: DISCONTINUED | OUTPATIENT
Start: 2020-07-26 | End: 2020-07-27 | Stop reason: HOSPADM

## 2020-07-26 RX ORDER — CHOLECALCIFEROL (VITAMIN D3) 125 MCG
5 CAPSULE ORAL NIGHTLY PRN
Status: DISCONTINUED | OUTPATIENT
Start: 2020-07-26 | End: 2020-07-27 | Stop reason: HOSPADM

## 2020-07-26 RX ORDER — MULTIVITAMIN,THERAPEUTIC
1 TABLET ORAL DAILY
Status: DISCONTINUED | OUTPATIENT
Start: 2020-07-26 | End: 2020-07-27 | Stop reason: HOSPADM

## 2020-07-26 RX ORDER — BUDESONIDE AND FORMOTEROL FUMARATE DIHYDRATE 160; 4.5 UG/1; UG/1
2 AEROSOL RESPIRATORY (INHALATION)
Status: DISCONTINUED | OUTPATIENT
Start: 2020-07-26 | End: 2020-07-27 | Stop reason: HOSPADM

## 2020-07-26 RX ORDER — NICOTINE POLACRILEX 4 MG
15 LOZENGE BUCCAL
Status: DISCONTINUED | OUTPATIENT
Start: 2020-07-26 | End: 2020-07-27 | Stop reason: HOSPADM

## 2020-07-26 RX ORDER — MAGNESIUM SULFATE HEPTAHYDRATE 40 MG/ML
4 INJECTION, SOLUTION INTRAVENOUS AS NEEDED
Status: DISCONTINUED | OUTPATIENT
Start: 2020-07-26 | End: 2020-07-27 | Stop reason: HOSPADM

## 2020-07-26 RX ORDER — ESCITALOPRAM OXALATE 10 MG/1
10 TABLET ORAL DAILY
Status: DISCONTINUED | OUTPATIENT
Start: 2020-07-26 | End: 2020-07-27 | Stop reason: HOSPADM

## 2020-07-26 RX ORDER — LEVOTHYROXINE SODIUM 0.03 MG/1
25 TABLET ORAL DAILY
Status: DISCONTINUED | OUTPATIENT
Start: 2020-07-26 | End: 2020-07-27 | Stop reason: HOSPADM

## 2020-07-26 RX ORDER — LORAZEPAM 1 MG/1
2 TABLET ORAL
Status: DISCONTINUED | OUTPATIENT
Start: 2020-07-26 | End: 2020-07-27 | Stop reason: HOSPADM

## 2020-07-26 RX ORDER — SODIUM CHLORIDE, SODIUM LACTATE, POTASSIUM CHLORIDE, CALCIUM CHLORIDE 600; 310; 30; 20 MG/100ML; MG/100ML; MG/100ML; MG/100ML
100 INJECTION, SOLUTION INTRAVENOUS CONTINUOUS
Status: DISPENSED | OUTPATIENT
Start: 2020-07-26 | End: 2020-07-26

## 2020-07-26 RX ORDER — IPRATROPIUM BROMIDE AND ALBUTEROL SULFATE 2.5; .5 MG/3ML; MG/3ML
3 SOLUTION RESPIRATORY (INHALATION)
Status: DISCONTINUED | OUTPATIENT
Start: 2020-07-26 | End: 2020-07-27 | Stop reason: HOSPADM

## 2020-07-26 RX ORDER — ACETAMINOPHEN 650 MG/1
650 SUPPOSITORY RECTAL EVERY 4 HOURS PRN
Status: DISCONTINUED | OUTPATIENT
Start: 2020-07-26 | End: 2020-07-27 | Stop reason: HOSPADM

## 2020-07-26 RX ORDER — LORAZEPAM 2 MG/ML
1 INJECTION INTRAMUSCULAR
Status: DISCONTINUED | OUTPATIENT
Start: 2020-07-26 | End: 2020-07-27 | Stop reason: HOSPADM

## 2020-07-26 RX ADMIN — SODIUM CHLORIDE, SODIUM LACTATE, POTASSIUM CHLORIDE, AND CALCIUM CHLORIDE 1000 ML: 600; 310; 30; 20 INJECTION, SOLUTION INTRAVENOUS at 03:48

## 2020-07-26 RX ADMIN — LORAZEPAM 2 MG: 2 INJECTION INTRAMUSCULAR; INTRAVENOUS at 10:58

## 2020-07-26 RX ADMIN — ONDANSETRON 4 MG: 2 INJECTION, SOLUTION INTRAMUSCULAR; INTRAVENOUS at 01:17

## 2020-07-26 RX ADMIN — LORAZEPAM 1 MG: 2 INJECTION INTRAMUSCULAR; INTRAVENOUS at 23:06

## 2020-07-26 RX ADMIN — ONDANSETRON 4 MG: 2 INJECTION INTRAMUSCULAR; INTRAVENOUS at 00:06

## 2020-07-26 RX ADMIN — LORAZEPAM 2 MG: 2 INJECTION INTRAMUSCULAR; INTRAVENOUS at 03:47

## 2020-07-26 RX ADMIN — Medication 10 ML: at 20:59

## 2020-07-26 RX ADMIN — Medication 10 ML: at 09:28

## 2020-07-26 RX ADMIN — INSULIN LISPRO 3 UNITS: 100 INJECTION, SOLUTION INTRAVENOUS; SUBCUTANEOUS at 13:19

## 2020-07-26 RX ADMIN — NICOTINE 1 PATCH: 21 PATCH TRANSDERMAL at 04:31

## 2020-07-26 RX ADMIN — SODIUM CHLORIDE 500 MG: 9 INJECTION, SOLUTION INTRAVENOUS at 09:28

## 2020-07-26 RX ADMIN — SODIUM CHLORIDE 500 MG: 9 INJECTION, SOLUTION INTRAVENOUS at 17:53

## 2020-07-26 RX ADMIN — ONDANSETRON 4 MG: 2 INJECTION INTRAMUSCULAR; INTRAVENOUS at 01:17

## 2020-07-26 RX ADMIN — SODIUM CHLORIDE 500 MG: 9 INJECTION, SOLUTION INTRAVENOUS at 20:59

## 2020-07-26 NOTE — ED NOTES
Pt attempting to wrap pulse ox cord around neck. Pt not cooperativity to verbal instructions. Curtain and doors left open to monitor patient.      Negin Ronquillo RN  07/26/20 0002

## 2020-07-26 NOTE — ED NOTES
Pt presents with altered mental status. Disoriented x4. Garbled, illogical speech.     Negin Ronquillo, RN  07/26/20 0001

## 2020-07-26 NOTE — THERAPY EVALUATION
Acute Care - Speech Language Pathology   Swallow Initial Evaluation  [facility]     Patient Name: Smita Steele  : 1965  MRN: [id number]  Today's Date: 2020               Admit Date: 2020    Visit Dx:     ICD-10-CM ICD-9-CM   1. Altered mental status, unspecified altered mental status type R41.82 780.97     Patient Active Problem List   Diagnosis   • Hypokalemia, inadequate intake   • Hyperglycemia   • Tobacco abuse   • Alcoholism /alcohol abuse (CMS/HCC)   • Hyponatremia   • Transaminitis   • Insomnia   • Hypophosphatemia   • Starvation ketoacidosis   • Type 1 diabetes mellitus with hyperglycemia (CMS/HCC)   • Acquired hypothyroidism   • Altered mental status   • Diabetes (CMS/HCC)   • BRITTANY (acute kidney injury) (CMS/HCC)   • COPD (chronic obstructive pulmonary disease) (CMS/HCC)   • Depression     Past Medical History:   Diagnosis Date   • Diabetes mellitus type I (CMS/HCC)    • Lupus (CMS/HCC)      Past Surgical History:   Procedure Laterality Date   •  SECTION     • CHOLECYSTECTOMY     • GALLBLADDER SURGERY     • HYSTERECTOMY          SWALLOW EVALUATION (last 72 hours)      SLP Adult Swallow Evaluation     Row Name 20 1500       Rehab Evaluation    Subjective Information  complains of abdominal pain  -EC    Patient Observations  poorly cooperative pt groaning/moaning & c/o abd pain  -EC    Patient Effort  poor  -EC    Comment  Refuses PO trials w/exception of water  -EC       General Information    Patient Profile Reviewed  yes  -EC    Pertinent History Of Current Problem  54-year-old female who was apparently normal on the phone with the daughter at 3 PM today.  Patient with altered mental status this evening.  Per EMS, patient ambulated fine to the stretcher and was alert and oriented x2 but then became more and confused in route.  -EC    Current Method of Nutrition  NPO pt failed swallow screen  -EC    Plans/Goals Discussed with  patient  -EC       Oral Motor and Function     Dentition Assessment  missing teeth missing some upper dentition, R side  -EC    Secretion Management  WNL/WFL  -EC       Oral Musculature and Cranial Nerve Assessment    Oral Motor General Assessment  generalized oral motor weakness  -EC    Oral Motor, Comment  Pt follows commands for opening mouth, lingual protrusion and lateralization and labial retraction though all movements appear significantly weak w/reduced range of motion  -EC       General Eating/Swallowing Observations    Respiratory Support Currently in Use  room air  -EC    Eating/Swallowing Skills  fed by SLP  -EC    Positioning During Eating  upright 90 degree;upright in bed  -EC    Utensils Used  spoon;straw  -EC    Consistencies Trialed  thin liquids  -EC       Clinical Swallow Eval    Clinical Swallow Evaluation Summary  Pt asleep upon entry though awakens w/verbal cues and repositioning. Pt repeatedly groaning/moaning during evaluation and reporting abdominal pain. Pt keeps eyes closed majority of eval though will open them w/verbal cue.  Pt refuses trials of ice chips.  Pt takes 2 sips of thin water by straw w/no clinical s/s of aspiration. Pt declines further PO trials 2/2 reported abdominal pain.  Unable to recommend a diet at this time due to pt declining PO trials.  Pt okay for thin water only. ST to re-eval in the AM w/further recommendations as indicated.  -EC       Clinical Impression    SLP Swallowing Diagnosis  unable to assess  -EC    Rehab Potential/Prognosis, Swallowing  re-evaluate goals as necessary  -EC    Swallow Criteria for Skilled Therapeutic Interventions Met  demonstrates skilled criteria  -EC       Recommendations    Therapy Frequency (Swallow)  PRN  -EC    Predicted Duration Therapy Intervention (Days)  until discharge  -EC    SLP Diet Recommendation  NPO okay for thin water  -EC    Recommended Diagnostics  reassess via clinical swallow evaluation  -EC    SLP Rec. for Method of Medication Administration  meds via alternate  route  -EC    Monitor for Signs of Aspiration  yes;notify SLP if any concerns  -EC       Swallow Goals (SLP)    Oral Nutrition/Hydration Goal Selection (SLP)  oral nutrition/hydration, SLP goal 1;oral nutrition/hydration, SLP goal 2  -EC       Oral Nutrition/Hydration Goal 1 (SLP)    Oral Nutrition/Hydration Goal 1, SLP  Pt to participate in re-eval of swallow for safest & least restrictive diet recommendation.  -EC    Time Frame (Oral Nutrition/Hydration Goal 1, SLP)  1 day  -EC       Oral Nutrition/Hydration Goal 2 (SLP)    Oral Nutrition/Hydration Goal 2, SLP  Pt to tolerate safest & least restrictive diet recommendations w/no complications from aspiration  -EC    Time Frame (Oral Nutrition/Hydration Goal 2, SLP)  by discharge  -EC      User Key  (r) = Recorded By, (t) = Taken By, (c) = Cosigned By    Initials Name Effective Dates    EC Chelsea Cole 03/01/19 -           EDUCATION  The patient has been educated in the following areas:   NPO rationale.    SLP Recommendation and Plan  SLP Swallowing Diagnosis: unable to assess  SLP Diet Recommendation: NPO(okay for thin water)     SLP Rec. for Method of Medication Administration: meds via alternate route     Monitor for Signs of Aspiration: yes, notify SLP if any concerns  Recommended Diagnostics: reassess via clinical swallow evaluation  Swallow Criteria for Skilled Therapeutic Interventions Met: demonstrates skilled criteria     Rehab Potential/Prognosis, Swallowing: re-evaluate goals as necessary  Therapy Frequency (Swallow): PRN  Predicted Duration Therapy Intervention (Days): until discharge            SLP GOALS     Row Name 07/26/20 1500             Oral Nutrition/Hydration Goal 1 (SLP)    Oral Nutrition/Hydration Goal 1, SLP  Pt to participate in re-eval of swallow for safest & least restrictive diet recommendation.  -EC      Time Frame (Oral Nutrition/Hydration Goal 1, SLP)  1 day  -EC         Oral Nutrition/Hydration Goal 2 (SLP)    Oral  Nutrition/Hydration Goal 2, SLP  Pt to tolerate safest & least restrictive diet recommendations w/no complications from aspiration  -EC      Time Frame (Oral Nutrition/Hydration Goal 2, SLP)  by discharge  -EC        User Key  (r) = Recorded By, (t) = Taken By, (c) = Cosigned By    Initials Name Provider Type    EC Chelsea Cole Speech and Language Pathologist             Time Calculation:                Chelsea Cole  7/26/2020

## 2020-07-26 NOTE — ED NOTES
Daughter at bedside. States she spoke to patient and patient stated she was going to take a nap. After a few hours did not hear back from patient so she called police to do a wellness check. Police found patient disoriented. Daughter states patient was completely alert and oriented before napping.      Laurel Smith, RN  07/26/20 0206

## 2020-07-26 NOTE — H&P
[facility] Medicine Services      Patient Name: Smita Steele  : 1965  MRN: [id number]  Primary Care Physician: Provider, No Known  Date of admission: 2020    Patient Care Team:  Provider, No Known as PCP - General          Subjective   History Present Illness     Chief Complaint:   Chief Complaint   Patient presents with   • Altered Mental Status     last normal 1500 per daughter.  N/V.       Ms. Steele is a 54 y.o. female with past medical history of hypothyroidism, alcohol abuse, COPD, depression, diabetes and tobacco abuse who presents to [facility] with an altered mental status.  EMS reports that patient's daughter last spoke with her at approximately 1500 hrs. on 2020 and she seemed normal.  Patient failed to answer her phone later and EMS was called to check on patient.  She initially would not answer the door when they arrived and when they make contact they noted she was alert and oriented x2.  At that time they reported patient was able to walk to the stretcher with a normal gait.  In the ED patient continues to have some worsening confusion and agitation.  At the time of my exam she is alert but does not appear oriented to person place or time.  She seems to have trouble focusing on questions and will often answer inappropriately (answer yes and no to open-ended questions or just say I do not know).  She does seem to have trouble cooperating with the exam failing to follow some commands and during an attempt to evaluate bilateral strength and movement patient appears somewhat afraid and pulls away curling up on the stretcher.  She does seem to have equal movements bilaterally although some regular jerking clonic motions are noted throughout the exam.    In the ED patient found to have labs significant for glucose: 215, creatinine: 1.05 with a BUN of 21, remainder of C CMP generally unremarkable.  Platelets: 466, remainder of CBC generally  unremarkable.  Ammonia: 21, PT: 9.7, INR: 0.91, PTT: 24.4, acetaminophen level less than 5.0, salicylate level: Less than 0.3, ethanol level less than 0.010, acetone negative.  UA unremarkable.  Urine tox positive for opiates otherwise unremarkable.  CT of head shows no acute intracranial process with some volume loss.  COVID-19 test negative.  EKG shows sinus rhythm at 87 without obvious acute ST changes or ectopy and a QTC of 491 ms.         History of Present Illness    Review of Systems   Unable to perform ROS: mental status change           Personal History     Past Medical History:   Past Medical History:   Diagnosis Date   • Diabetes mellitus type I (CMS/HCC)    • Lupus (CMS/HCC)        Surgical History:      Past Surgical History:   Procedure Laterality Date   •  SECTION     • CHOLECYSTECTOMY     • GALLBLADDER SURGERY     • HYSTERECTOMY             Family History: family history includes Cancer in her father; Thyroid disease in her mother. Otherwise pertinent FHx was reviewed and unremarkable.     Social History:  reports that she has quit smoking. She smoked 0.50 packs per day. She has never used smokeless tobacco. She reports that she drinks about 4.0 standard drinks of alcohol per week. She reports that she does not use drugs.      Medications:  Prior to Admission medications    Medication Sig Start Date End Date Taking? Authorizing Provider   ACCU-CHEK FASTCLIX LANCETS misc 1 each 4 (Four) Times a Day Before Meals & at Bedtime. Dx: E10.65 3/7/20   Esme Trammell MD   acetaminophen (TYLENOL) 325 MG tablet  20   Provider, MD Gabrielle   Acetone, Urine, Test (KETONE TEST) strip 1 each by In Vitro route As Needed (Check for urine ketones when blood sugar is greater than 250 or when sick). Dx: E10.65 3/7/20   Esme Trammell MD   albuterol sulfate  (90 Base) MCG/ACT inhaler Inhale 2 puffs Every 4 (Four) Hours As Needed for Wheezing or Shortness of Air. 3/7/20   Esme Trammell MD    budesonide-formoterol (SYMBICORT) 160-4.5 MCG/ACT inhaler Inhale 2 puffs 2 (Two) Times a Day. 3/7/20   Esme Trammell MD   escitalopram (LEXAPRO) 10 MG tablet  6/29/20   Gabrielle Loco MD   gabapentin (NEURONTIN) 300 MG capsule  6/19/20   Gabrielle Loco MD   glucose blood (ACCU-CHEK GUIDE) test strip 1 each by Other route 4 (Four) Times a Day Before Meals & at Bedtime. Dx: E10.65. Use as instructed 3/7/20   Esme Trammell MD   HYDROcodone-acetaminophen (Norco) 7.5-325 MG per tablet Take 1-2 tablets by mouth Every 6 (Six) Hours As Needed for Moderate Pain . 7/16/20   Anahi Green APRN   Insulin Glargine (LANTUS SOLOSTAR) 100 UNIT/ML injection pen Inject 20 Units under the skin into the appropriate area as directed Daily. 3/7/20   Esme Trammell MD   insulin lispro (humaLOG) 100 UNIT/ML injection Inject 0-7 Units under the skin into the appropriate area as directed 3 (Three) Times a Day With Meals.  Patient taking differently: Inject 6 Units under the skin into the appropriate area as directed 3 (Three) Times a Day With Meals. Sliding scale 3/7/20   Esme Trammell MD   Insulin Pen Needle (PEN NEEDLES) 32G X 4 MM misc 1 each 4 (Four) Times a Day Before Meals & at Bedtime. 3/7/20   Esme Trammell MD   levothyroxine (SYNTHROID, LEVOTHROID) 25 MCG tablet Take 1 tablet by mouth Daily. 3/7/20   Esme Trammell MD   loperamide (IMODIUM) 2 MG capsule  4/26/20   Gabrielle Loco MD   meloxicam (MOBIC) 7.5 MG tablet  6/9/20   Gabrielle Loco MD   metFORMIN (GLUCOPHAGE) 1000 MG tablet Take 1,000 mg by mouth 2 (Two) Times a Day With Meals. 7/3/20   Gabrielle Loco MD   Multiple Vitamin (THERA) tablet tablet Take 1 tablet by mouth Daily. 3/8/20   Esme Trammell MD   promethazine (PHENERGAN) 25 MG tablet  6/21/20   Gabrielle Loco MD   tiotropium (SPIRIVA HANDIHALER) 18 MCG per inhalation capsule Place 1 capsule into inhaler and inhale Daily. 3/7/20   Esme Trammell MD   traMADol (ULTRAM) 50 MG tablet   6/17/20   Provider, MD Gabrielle   Vitamin D, Cholecalciferol, 25 MCG (1000 UT) capsule Take  by mouth.    Provider, MD CARLY AnthonyXELA INHUB 100-50 MCG/DOSE DISKUS  6/26/20   Provider, MD Gabrielle       Allergies:    Allergies   Allergen Reactions   • Penicillins Unknown - High Severity       Objective   Objective     Vital Signs  Temp:  [97.7 °F (36.5 °C)] 97.7 °F (36.5 °C)  Heart Rate:  [72-88] 84  Resp:  [19] 19  BP: (116-155)/() 116/88  SpO2:  [96 %-97 %] 97 %  on   ;      Body mass index is 25.69 kg/m².    Physical Exam   Constitutional: She appears well-developed and well-nourished. She appears distressed.   HENT:   Head: Normocephalic and atraumatic.   Right Ear: External ear normal.   Left Ear: External ear normal.   Nose: Nose normal.   Eyes: Conjunctivae and EOM are normal.   Neck: Normal range of motion. No JVD present.   Cardiovascular: Normal rate, regular rhythm and intact distal pulses.   Difficult to evaluate heart sounds secondary to patient movement and pulling away during exam   Pulmonary/Chest: Effort normal.   Difficult to evaluate breath sounds secondary to patient movement pulling away during the exam.  She does not appear to be in any acute respiratory distress.   Abdominal: Soft.   Musculoskeletal: Normal range of motion.   Neurological: She is alert.   Skin: Skin is warm and dry.   Psychiatric:   Peculiar behavior with a somewhat frightened affect noted at the time of my exam   Vitals reviewed.      Results Review:  I have personally reviewed most recent cardiac tracings, lab results and radiology images and interpretations and agree with findings, most notably: CBC, CMP, salicylate, acetaminophen, UA, urine tox, CT of head, EKG and COVID-19 test.    Results from last 7 days   Lab Units 07/25/20  2349   WBC 10*3/mm3 10.50   HEMOGLOBIN g/dL 12.3   HEMATOCRIT % 37.1   PLATELETS 10*3/mm3 466*   INR  0.91     Results from last 7 days   Lab Units 07/25/20  2349 07/25/20  2332    SODIUM mmol/L 144  --    POTASSIUM mmol/L 4.1  --    CHLORIDE mmol/L 104  --    CO2 mmol/L 25.0  --    BUN  21*  --    CREATININE mg/dL 1.05*  --    GLUCOSE mg/dL 214*  --    CALCIUM mg/dL 9.7  --    ALT (SGPT) U/L 30  --    AST (SGOT) U/L 17  --    LACTATE mmol/L  --  1.5     Estimated Creatinine Clearance: 55.2 mL/min (A) (by C-G formula based on SCr of 1.05 mg/dL (H)).  Brief Urine Lab Results  (Last result in the past 365 days)      Color   Clarity   Blood   Leuk Est   Nitrite   Protein   CREAT   Urine HCG        07/25/20 2349 Yellow Clear Negative Negative Negative Negative               Microbiology Results (last 10 days)     Procedure Component Value - Date/Time    COVID-19, [facility] IN-HOUSE,NP Swab (NO TRANSPORT MEDIA) 2 HR TAT - Swab, Nasopharynx [[id number]]  (Normal) Collected:  07/25/20 2349    Lab Status:  Final result Specimen:  Swab from Nasopharynx Updated:  07/26/20 0039     COVID19 Not Detected    Narrative:       Fact sheet for providers: https://www.fda.gov/media/742619/download     Fact sheet for patients: https://www.fda.gov/media/424552/download          ECG/EMG Results (most recent)     Procedure Component Value Units Date/Time    ECG 12 Lead [[id number]] Collected:  07/25/20 2321     Updated:  07/25/20 2323    Narrative:       HEART RATE= 87  bpm  RR Interval= 688  ms  [place] Interval= 116  ms  P Horizontal Axis= 11  deg  P Front Axis= 58  deg  QRSD Interval= 83  ms  QT Interval= 407  ms  QRS Axis= 74  deg  T Wave Axis= 62  deg  - NORMAL ECG -  Sinus rhythm  Electronically Signed By:   Date and Time of Study: 2020-07-25 23:21:31                    Ct Head Without Contrast    Result Date: 7/25/2020  1. No acute intracranial process. Volume loss.  MRI is more sensitive for the detection of acute nonhemorrhagic infarct. 2. Paranasal sinus disease. Secretions could represent acute sinusitis.  Electronically signed by:  Hermes Ash M.D.  7/25/2020 11:38 PM        Estimated Creatinine  Clearance: 55.2 mL/min (A) (by C-G formula based on SCr of 1.05 mg/dL (H)).    Assessment/Plan   Assessment/Plan       Active Hospital Problems    Diagnosis  POA   • **Altered mental status [R41.82]  Yes     Priority: High   • Diabetes (CMS/[facility]) [E11.9]  Yes     Priority: Medium   • COPD (chronic obstructive pulmonary disease) (CMS/[facility]) [J44.9]  Yes     Priority: Medium   • Alcoholism /alcohol abuse (CMS/[facility]) [F10.20]  Yes     Priority: Medium   • Depression [F32.9]  Yes     Priority: Low   • Tobacco abuse [Z72.0]  Yes     Priority: Low   • BRITTANY (acute kidney injury) (CMS/[facility]) [N17.9]  Yes   • Acquired hypothyroidism [E03.9]  Yes      Resolved Hospital Problems   No resolved problems to display.     Altered mental status  -Patient presents with altered mental status with last known normal at approximately 1500 hrs. on 7/25/2020  -ammonia: 21  -UA unremarkable,   -urine tox screen positive for opiates (patient does have a prescription for Ultram).     -CT of head shows no acute intracranial abnormality.    -EKG shows sinus rhythm at 87 without obvious acute ST changes or ectopy and a QTC of 491 ms.  -Check  B12, TSH, A1c and lipid panel  -Neurochecks  -Nursing swallow assessment  -Falls precautions  -Hold Norco and Ultram  -500 mg thiamine IV ordered 3 times daily in consultation with pharmacy  -MRI brain ordered    BRITTANY  - Creatine: 1.05, BUN: 21, BUN/creatinine ratio: 20, (creatinine 0.49 with a BUN of 7 on 3/7/2020)  - Hold metformin, Mobic and gabapentin  -1 L of LR ordered with LR at 100 MRL's per hour x10 hours to follow  -Avoid nephrotoxic medication and IV dye unless urgently needed  -Monitor BMP and I's and O's while admitted    Diabetes  -Poorly controlled with glucose of 214 on admission  - Check A1c  - Hold metformin  -Sliding scale insulin ordered, patient will need basal dose confirmed in a.m.  -diabetic diet  -monitor AC and HS    COPD  -Continue Symbicort and DuoNeb    History of alcohol abuse  -Unable  to ascertain current alcohol use though ED provider did report patient's daughter stated patient had been drinking again recently   -CIWA protocol ordered  -Thiamine ordered as above    Depression  -Continue Lexapro    History of tobacco abuse  -Unable to obtain current tobacco use information due to patient's mental status  -Nicotine patch ordered    Hypothyroidism  -Check TSH and free T4  -Continue levothyroxine            VTE Prophylaxis -SCDs  Mechanical Order History:     None      Pharmalogical Order History:     None          CODE STATUS:    Code Status and Medical Interventions:   Ordered at: 07/26/20 0221     Code Status:    CPR     Medical Interventions (Level of Support Prior to Arrest):    Full         I discussed the patient's findings and my recommendations with patient and ED provider and pharmacist.        Electronically signed by Chi Bustos PA-C, 07/26/20, 2:02 AM.  [facility]ist Team

## 2020-07-26 NOTE — PLAN OF CARE
"  Problem: Fall Risk (Adult)  Goal: Identify Related Risk Factors and Signs and Symptoms  Outcome: Ongoing (interventions implemented as appropriate)  Note:   Patient arrived to unit from ED.ED nurse reported daughter at bedside in ER and reported patient was Alert and orientated approx. Three hours before arriving to ER. She was unable to reach patient by phone, called police for wellness check and patient found disorientated and EMS called. Patient upon arrival to floor very agitated, tearful, repeating self. Did ask to to \"pee\" and assisted to BSC with void of julissa urine 250cc. C/o ankle pain with transfers. Patient flailing legs and arms, visible jerking upper ext. Patient unable to state if this is normal for her. Answers to questions not appropriate and repeating letters of alphabet. CIWA 17. Of note, removed medications from patient purse and sent to pharmacy. Hydrocodone 7.5/500 filled on 7-23-20 with 40 tab and currently in possession of 23. NP made aware.     "

## 2020-07-26 NOTE — PLAN OF CARE
Patient required ativan once today for CIWA of 12. Otherwise she has just said she is tired and has been sleeping on and off all day. Patient is now calm and cooperative with staff. Patient vitals WNL, HR maintaining right around 100 for most of day today. Nurse will continue to monitor.

## 2020-07-26 NOTE — ED PROVIDER NOTES
Subjective   54-year-old female who was apparently normal on the phone with the daughter at 3 PM today.  Patient with altered mental status this evening.  Per EMS, patient ambulated fine to the stretcher and was alert and oriented x2 but then became more and confused in route.  Patient is nauseous.  Patient will not follow commands or answer my questions.  Patient is alert and mildly distressed secondary to anxiety and nausea.          Review of Systems   Unable to perform ROS: Mental status change       Past Medical History:   Diagnosis Date   • Diabetes mellitus type I (CMS/HCC)    • Lupus (CMS/HCC)        Allergies   Allergen Reactions   • Penicillins Unknown - High Severity       Past Surgical History:   Procedure Laterality Date   •  SECTION     • CHOLECYSTECTOMY     • GALLBLADDER SURGERY     • HYSTERECTOMY         Family History   Problem Relation Age of Onset   • Thyroid disease Mother    • Cancer Father        Social History     Socioeconomic History   • Marital status: Single     Spouse name: Not on file   • Number of children: Not on file   • Years of education: Not on file   • Highest education level: Not on file   Tobacco Use   • Smoking status: Former Smoker     Packs/day: 0.50   • Smokeless tobacco: Never Used   Substance and Sexual Activity   • Alcohol use: Yes     Alcohol/week: 4.0 standard drinks     Types: 4 Shots of liquor per week     Comment: a day   • Drug use: Never   • Sexual activity: Defer           Objective   Physical Exam   Constitutional: She appears well-developed and well-nourished.   HENT:   Head: Normocephalic and atraumatic.   Mouth/Throat: Oropharynx is clear and moist.   Eyes: Pupils are equal, round, and reactive to light.   Neck: Normal range of motion. Neck supple.   Cardiovascular: Normal rate, regular rhythm, normal heart sounds and intact distal pulses.   Pulmonary/Chest: Effort normal and breath sounds normal.   Abdominal: Soft. Bowel sounds are normal. She  exhibits no distension. There is no tenderness.   Musculoskeletal: Normal range of motion. She exhibits no edema.   Neurological: She is alert.   Moves all extremities, no facial asymmetry, speech clear   Skin: Skin is warm and dry. Capillary refill takes less than 2 seconds.       Procedures           ED Course  ED Course as of Jul 26 0151   Sat Jul 25, 2020   2332 Ekg interpretation:  NSR, rate 87, No acute st change    [JR]      ED Course User Index  [JR] Mike Alvarado MD                                           [facility]  Number of Diagnoses or Management Options  Altered mental status, unspecified altered mental status type:   Diagnosis management comments: Results for orders placed or performed during the hospital encounter of 07/25/20  -COVID-19, [facility] IN-HOUSE,NP Swab (NO TRANSPORT MEDIA) 2 HR TAT - Swab, Nasopharynx       Result                      Value             Ref Range           COVID19                     Not Detected      Not Detected*  -Comprehensive Metabolic Panel       Result                      Value             Ref Range           Glucose                     214 (H)           65 - 99 mg/dL       BUN                                                               Creatinine                  1.05 (H)          0.57 - 1.00 *       Sodium                      144               136 - 145 mm*       Potassium                   4.1               3.5 - 5.2 mm*       Chloride                    104               98 - 107 mmo*       CO2                         25.0              22.0 - 29.0 *       Calcium                     9.7               8.6 - 10.5 m*       Total Protein               7.6               6.0 - 8.5 g/*       Albumin                     4.30              3.50 - 5.20 *       ALT (SGPT)                  30                1 - 33 U/L          AST (SGOT)                  17                1 - 32 U/L          Alkaline Phosphatase        132 (H)           39 - 117 U/L        Total Bilirubin              <0.2              0.0 - 1.2 mg*       eGFR Non  Amer       54 (L)            >60 mL/min/1*       Globulin                    3.3               gm/dL               A/G Ratio                   1.3               g/dL                BUN/Creatinine Ratio                                              Anion Gap                   15.0              5.0 - 15.0 m*  -Protime-INR       Result                      Value             Ref Range           Protime                     9.7               9.6 - 11.7 S*       INR                         0.91              0.90 - 1.10    -aPTT       Result                      Value             Ref Range           PTT                         24.4              24.0 - 31.0 *  -Urinalysis With Culture If Indicated - Urine, Catheter       Result                      Value             Ref Range           Color, UA                   Yellow            Yellow, Straw       Appearance, UA              Clear             Clear               pH, UA                      5.5               5.0 - 8.0           Specific [place], UA        1.027             1.005 - 1.030       Glucose, UA                 Negative          Negative            Ketones, UA                 Negative          Negative            Bilirubin, UA               Negative          Negative            Blood, UA                   Negative          Negative            Protein, UA                 Negative          Negative            Leuk Esterase, UA           Negative          Negative            Nitrite, UA                 Negative          Negative            Urobilinogen, UA            0.2 E.U./dL       0.2 - 1.0 E.*  -Ammonia       Result                      Value             Ref Range           Ammonia                     21                11 - 51 umol*  -Acetaminophen Level       Result                      Value             Ref Range           Acetaminophen               <5.0 (L)          10.0 - 30.0 *  -Ethanol        Result                      Value             Ref Range           Ethanol %                   <0.010            %              -Urine Drug Screen - Urine, Clean Catch       Result                      Value             Ref Range           Amphet/Methamphet, Scr*     Negative          Negative            Barbiturates Screen, U*     Negative          Negative            Benzodiazepine Screen,*     Negative          Negative            Cocaine Screen, Urine       Negative          Negative            Opiate Screen               Positive (A)      Negative            THC, Screen, Urine          Negative          Negative            Methadone Screen, Urine     Negative          Negative            Oxycodone Screen, Urine     Negative          Negative       -Salicylate Level       Result                      Value             Ref Range           Salicylate                  <0.3              <=30.0 mg/dL   -CBC Auto Differential       Result                      Value             Ref Range           WBC                         10.50             3.40 - 10.80*       RBC                         3.89              3.77 - 5.28 *       Hemoglobin                  12.3              12.0 - 15.9 *       Hematocrit                  37.1              34.0 - 46.6 %       MCV                         95.2              79.0 - 97.0 *       MCH                         31.7              26.6 - 33.0 *       MCHC                        33.3              31.5 - 35.7 *       RDW                         13.1              12.3 - 15.4 %       RDW-SD                      43.3              37.0 - 54.0 *       MPV                         8.9               6.0 - 12.0 fL       Platelets                   466 (H)           140 - 450 10*       Neutrophil %                65.9              42.7 - 76.0 %       Lymphocyte %                24.4              19.6 - 45.3 %       Monocyte %                  4.5 (L)           5.0 - 12.0 %        Eosinophil %                 4.5               0.3 - 6.2 %         Basophil %                  0.7               0.0 - 1.5 %         Neutrophils, Absolute       6.90              1.70 - 7.00 *       Lymphocytes, Absolute       2.60              0.70 - 3.10 *       Monocytes, Absolute         0.50              0.10 - 0.90 *       Eosinophils, Absolute       0.50 (H)          0.00 - 0.40 *       Basophils, Absolute         0.10              0.00 - 0.20 *       nRBC                        0.0               0.0 - 0.2 /1*  -Acetone       Result                      Value             Ref Range           Acetone                     Negative          Negative       -BUN       Result                      Value             Ref Range           BUN                         21 (H)            6 - 20 mg/dL   -POC Lactate       Result                      Value             Ref Range           Lactate                     1.5               0.5 - 2.0 mm*  -POC Glucose Once       Result                      Value             Ref Range           Glucose                     216 (H)           70 - 105 mg/*  -Gold Top - SST       Result                      Value             Ref Range           Extra Tube                                                    Hold for add-ons.  Ct Head Without Contrast    Result Date: 7/25/2020  1. No acute intracranial process. Volume loss.  MRI is more sensitive for the detection of acute nonhemorrhagic infarct. 2. Paranasal sinus disease. Secretions could represent acute sinusitis.  Electronically signed by:  Hermes Ash M.D.  7/25/2020 11:38 PM    On reevaluation, patient mildly drowsy, calm, otherwise exam unchanged.  Patient still oriented only to person.  When asked if she is experiencing any pain, patient denies any pain or problems.  Patient has had some vomiting intermittently.  Daughter is at bedside, patient has had intermittent confusion in the past attributed to her lupus.  Otherwise, daughter states  patient has not been abusing alcohol to her knowledge her lately.  Daughter is unsure if patient could have taken too much of her pain medicines.  Inspect reviewed, patient was prescribed tramadol 50 mg 240 tablets on July 9 and Lortab 7.5 #15 July 17, #40 July 23.  There is been no fever or leukocytosis or meningismus or rash.  Daughter is unaware of any tick or insect bites.  Differential at this time is broad, no focal weakness or numbness or slurred speech to suggest a stroke or large vessel occlusion.  Will admit for further testing and observation.       Amount and/or Complexity of Data Reviewed  Clinical lab tests: reviewed  Tests in the radiology section of CPT®: reviewed        Final diagnoses:   Altered mental status, unspecified altered mental status type            Mike Alvarado MD  07/26/20 0151

## 2020-07-26 NOTE — PLAN OF CARE
Problem: Patient Care Overview  Goal: Plan of Care Review  Outcome: Ongoing (interventions implemented as appropriate)  Flowsheets (Taken 7/26/2020 1530)  Outcome Summary:   Swallow eval completed on this date. Pt asleep upon entry though awakens w/verbal cues and repositioning. Pt repeatedly groaning/moaning during evaluation and reporting abdominal pain. Pt keeps eyes closed majority of eval though will open them w/verbal cue. Pt follows simple body commands for oral mech though demonstrates weakness and significantly reduced range of motion. Pt refuses trials of ice chips.  Pt takes 2 sips of thin water by straw w/no clinical s/s of aspiration. Pt declines further PO trials 2/2 reported abdominal pain.  Unable to recommend a diet at this time due to pt declining PO trials.  Pt okay for thin water only. ST to re-eval in the AM w/further recommendations as indicated.

## 2020-07-27 VITALS
BODY MASS INDEX: 26.25 KG/M2 | OXYGEN SATURATION: 98 % | TEMPERATURE: 98.6 F | WEIGHT: 148.15 LBS | RESPIRATION RATE: 16 BRPM | SYSTOLIC BLOOD PRESSURE: 123 MMHG | HEIGHT: 63 IN | DIASTOLIC BLOOD PRESSURE: 64 MMHG | HEART RATE: 92 BPM

## 2020-07-27 PROBLEM — R41.82 ALTERED MENTAL STATUS: Status: RESOLVED | Noted: 2020-07-26 | Resolved: 2020-07-27

## 2020-07-27 PROBLEM — N17.9 AKI (ACUTE KIDNEY INJURY) (HCC): Status: RESOLVED | Noted: 2020-07-26 | Resolved: 2020-07-27

## 2020-07-27 LAB
ANION GAP SERPL CALCULATED.3IONS-SCNC: 13 MMOL/L (ref 5–15)
BACTERIA BLD CULT: ABNORMAL
BASOPHILS # BLD AUTO: 0 10*3/MM3 (ref 0–0.2)
BASOPHILS NFR BLD AUTO: 0.3 % (ref 0–1.5)
BOTTLE TYPE: ABNORMAL
BUN SERPL-MCNC: 12 MG/DL (ref 6–20)
BUN SERPL-MCNC: ABNORMAL MG/DL
BUN/CREAT SERPL: ABNORMAL
CALCIUM SPEC-SCNC: 9.1 MG/DL (ref 8.6–10.5)
CHLORIDE SERPL-SCNC: 106 MMOL/L (ref 98–107)
CO2 SERPL-SCNC: 25 MMOL/L (ref 22–29)
CREAT SERPL-MCNC: 0.7 MG/DL (ref 0.57–1)
DEPRECATED RDW RBC AUTO: 44.2 FL (ref 37–54)
EOSINOPHIL # BLD AUTO: 0.2 10*3/MM3 (ref 0–0.4)
EOSINOPHIL NFR BLD AUTO: 2.5 % (ref 0.3–6.2)
ERYTHROCYTE [DISTWIDTH] IN BLOOD BY AUTOMATED COUNT: 13.4 % (ref 12.3–15.4)
GFR SERPL CREATININE-BSD FRML MDRD: 87 ML/MIN/1.73
GLUCOSE BLDC GLUCOMTR-MCNC: 167 MG/DL (ref 70–105)
GLUCOSE BLDC GLUCOMTR-MCNC: 208 MG/DL (ref 70–105)
GLUCOSE SERPL-MCNC: 249 MG/DL (ref 65–99)
HBA1C MFR BLD: 6.5 % (ref 3.5–5.6)
HCT VFR BLD AUTO: 34.4 % (ref 34–46.6)
HGB BLD-MCNC: 11.8 G/DL (ref 12–15.9)
LYMPHOCYTES # BLD AUTO: 2.8 10*3/MM3 (ref 0.7–3.1)
LYMPHOCYTES NFR BLD AUTO: 37.6 % (ref 19.6–45.3)
MAGNESIUM SERPL-MCNC: 1.9 MG/DL (ref 1.6–2.6)
MCH RBC QN AUTO: 32 PG (ref 26.6–33)
MCHC RBC AUTO-ENTMCNC: 34.2 G/DL (ref 31.5–35.7)
MCV RBC AUTO: 93.5 FL (ref 79–97)
MONOCYTES # BLD AUTO: 0.6 10*3/MM3 (ref 0.1–0.9)
MONOCYTES NFR BLD AUTO: 7.5 % (ref 5–12)
NEUTROPHILS NFR BLD AUTO: 3.9 10*3/MM3 (ref 1.7–7)
NEUTROPHILS NFR BLD AUTO: 52.1 % (ref 42.7–76)
NRBC BLD AUTO-RTO: 0 /100 WBC (ref 0–0.2)
PHOSPHATE SERPL-MCNC: 3.6 MG/DL (ref 2.5–4.5)
PLATELET # BLD AUTO: 430 10*3/MM3 (ref 140–450)
PMV BLD AUTO: 8.8 FL (ref 6–12)
POTASSIUM SERPL-SCNC: 3.3 MMOL/L (ref 3.5–5.2)
RBC # BLD AUTO: 3.68 10*6/MM3 (ref 3.77–5.28)
SODIUM SERPL-SCNC: 144 MMOL/L (ref 136–145)
WBC # BLD AUTO: 7.4 10*3/MM3 (ref 3.4–10.8)

## 2020-07-27 PROCEDURE — 92526 ORAL FUNCTION THERAPY: CPT

## 2020-07-27 PROCEDURE — 85025 COMPLETE CBC W/AUTO DIFF WBC: CPT | Performed by: PHYSICIAN ASSISTANT

## 2020-07-27 PROCEDURE — 80048 BASIC METABOLIC PNL TOTAL CA: CPT | Performed by: PHYSICIAN ASSISTANT

## 2020-07-27 PROCEDURE — 84100 ASSAY OF PHOSPHORUS: CPT | Performed by: PHYSICIAN ASSISTANT

## 2020-07-27 PROCEDURE — 63710000001 INSULIN LISPRO (HUMAN) PER 5 UNITS: Performed by: PHYSICIAN ASSISTANT

## 2020-07-27 PROCEDURE — 82962 GLUCOSE BLOOD TEST: CPT

## 2020-07-27 PROCEDURE — 97165 OT EVAL LOW COMPLEX 30 MIN: CPT

## 2020-07-27 PROCEDURE — 25010000002 THIAMINE PER 100 MG: Performed by: PHYSICIAN ASSISTANT

## 2020-07-27 PROCEDURE — 99217 PR OBSERVATION CARE DISCHARGE MANAGEMENT: CPT | Performed by: INTERNAL MEDICINE

## 2020-07-27 PROCEDURE — 94799 UNLISTED PULMONARY SVC/PX: CPT

## 2020-07-27 PROCEDURE — 83735 ASSAY OF MAGNESIUM: CPT | Performed by: PHYSICIAN ASSISTANT

## 2020-07-27 PROCEDURE — 99214 OFFICE O/P EST MOD 30 MIN: CPT | Performed by: PSYCHIATRY & NEUROLOGY

## 2020-07-27 PROCEDURE — G0378 HOSPITAL OBSERVATION PER HR: HCPCS

## 2020-07-27 PROCEDURE — 94640 AIRWAY INHALATION TREATMENT: CPT

## 2020-07-27 RX ORDER — LANOLIN ALCOHOL/MO/W.PET/CERES
100 CREAM (GRAM) TOPICAL DAILY
Qty: 30 TABLET | Refills: 0 | Status: SHIPPED | OUTPATIENT
Start: 2020-07-27 | End: 2021-05-27

## 2020-07-27 RX ORDER — HYDROCODONE BITARTRATE AND ACETAMINOPHEN 5; 325 MG/1; MG/1
1 TABLET ORAL EVERY 6 HOURS PRN
Status: DISCONTINUED | OUTPATIENT
Start: 2020-07-27 | End: 2020-07-27 | Stop reason: HOSPADM

## 2020-07-27 RX ADMIN — SODIUM CHLORIDE 500 MG: 9 INJECTION, SOLUTION INTRAVENOUS at 08:17

## 2020-07-27 RX ADMIN — Medication 10 ML: at 08:17

## 2020-07-27 RX ADMIN — LEVOTHYROXINE SODIUM 25 MCG: 25 TABLET ORAL at 08:16

## 2020-07-27 RX ADMIN — BUDESONIDE AND FORMOTEROL FUMARATE DIHYDRATE 2 PUFF: 160; 4.5 AEROSOL RESPIRATORY (INHALATION) at 07:39

## 2020-07-27 RX ADMIN — INSULIN LISPRO 5 UNITS: 100 INJECTION, SOLUTION INTRAVENOUS; SUBCUTANEOUS at 08:17

## 2020-07-27 RX ADMIN — LORAZEPAM 1 MG: 1 TABLET ORAL at 08:34

## 2020-07-27 RX ADMIN — NICOTINE 1 PATCH: 21 PATCH TRANSDERMAL at 08:17

## 2020-07-27 RX ADMIN — HYDROCODONE BITARTRATE AND ACETAMINOPHEN 1 TABLET: 5; 325 TABLET ORAL at 08:16

## 2020-07-27 RX ADMIN — THERA TABS 1 TABLET: TAB at 08:16

## 2020-07-27 RX ADMIN — INSULIN LISPRO 3 UNITS: 100 INJECTION, SOLUTION INTRAVENOUS; SUBCUTANEOUS at 12:37

## 2020-07-27 RX ADMIN — HYDROCODONE BITARTRATE AND ACETAMINOPHEN 1 TABLET: 5; 325 TABLET ORAL at 01:36

## 2020-07-27 NOTE — PROGRESS NOTES
[address] [address]    Pulmonary Disease Educator  Discharge Planning    Patient Name: Smita Steele  : 1965  Room #: 263/1  Pulmonologist: None  Admit Diagnosis: Altered mental status, unspecified altered mental status type [R41.82]  Current Admission Date: 2020   Previous Admit: Hypokalemia  Previous Admission Date: 3/1/2020  Body mass index is 26.24 kg/m².      Smita Steele is a former smoker who quit this year ().    PFT’s in the last year?  No    Room Air O2 Sat: 98%   Current O2: Room Air   Home O2: No  Home BiPap/CPAP: No     ABG: No results found for: SITE, ALLENTEST, PHART, OXH3YBT, PO2ART, GAL1NWG, BASEEXCESS, P4UHJPML, HGBBG, HCTABG, OXYHEMOGLOBI, METHHGBN, CARBOXYHGB, CO2CT, BAROMETRIC, MODALITY, FIO2      Current Home Medications:  Prior to Admission medications    Medication Sig Start Date End Date Taking? Authorizing Provider   albuterol sulfate  (90 Base) MCG/ACT inhaler Inhale 2 puffs Every 4 (Four) Hours As Needed for Wheezing or Shortness of Air. 3/7/20  Yes Esme Trammell MD   escitalopram (LEXAPRO) 10 MG tablet Take 10 mg by mouth Daily. 20  Yes ProviderGabrielle MD   gabapentin (NEURONTIN) 300 MG capsule Take 300 mg by mouth 4 (Four) Times a Day. 20  Yes Gabrielle Loco MD   HYDROcodone-acetaminophen (Norco) 7.5-325 MG per tablet Take 1-2 tablets by mouth Every 6 (Six) Hours As Needed for Moderate Pain . 20  Yes Anahi Green APRN   Insulin Glargine (LANTUS SOLOSTAR) 100 UNIT/ML injection pen Inject 20 Units under the skin into the appropriate area as directed Daily.  Patient taking differently: Inject 20 Units under the skin into the appropriate area as directed Every Night. 3/7/20  Yes Esme Trammell MD   insulin lispro (humaLOG) 100 UNIT/ML injection Inject 0-7 Units under the skin into the appropriate area as directed 3 (Three) Times a Day With Meals.  Patient taking differently: Inject 6  Units under the skin into the appropriate area as directed 3 (Three) Times a Day With Meals. Sliding scale 3/7/20  Yes Esme Trammell MD   levothyroxine (SYNTHROID, LEVOTHROID) 25 MCG tablet Take 1 tablet by mouth Daily. 3/7/20  Yes Esme Trammell MD   meloxicam (MOBIC) 7.5 MG tablet Take 7.5 mg by mouth Daily. 6/9/20  Yes Gabrielle Loco MD   metFORMIN (GLUCOPHAGE) 1000 MG tablet Take 1,000 mg by mouth 2 (Two) Times a Day With Meals. 7/3/20  Yes Gabrielle Loco MD   promethazine (PHENERGAN) 25 MG tablet Take 25 mg by mouth Every 6 (Six) Hours As Needed. 6/21/20  Yes Gabrielle Loco MD   tiotropium (SPIRIVA HANDIHALER) 18 MCG per inhalation capsule Place 1 capsule into inhaler and inhale Daily. 3/7/20  Yes Esme Trammell MD   WIXELA INHUB 100-50 MCG/DOSE DISKUS Inhale 1 puff 2 (Two) Times a Day. 6/26/20  Yes Gabrielle Loco MD   acetaminophen (TYLENOL) 325 MG tablet  6/18/20   Gabrielle Loco MD   budesonide-formoterol (SYMBICORT) 160-4.5 MCG/ACT inhaler Inhale 2 puffs 2 (Two) Times a Day. 3/7/20   Esme Trammell MD   glucose blood (ACCU-CHEK GUIDE) test strip 1 each by Other route 4 (Four) Times a Day Before Meals & at Bedtime. Dx: E10.65. Use as instructed 3/7/20   Esme Trammell MD   loperamide (IMODIUM) 2 MG capsule  4/26/20   Gabrielle Loco MD   Multiple Vitamin (THERA) tablet tablet Take 1 tablet by mouth Daily. 3/8/20   Esme Trammell MD   Vitamin D, Cholecalciferol, 25 MCG (1000 UT) capsule Take  by mouth.    Gabrielle Loco MD       Recommendations/Testing:  Smita Steele was seen by the pulmonary disease educator for evaluation of care gaps according to the COPD GOLD Guidelines.  After evaluation the patient's cardiopulmonary status, it is the recommendation of the care coordinator that the patient receive the following testing, equipment, or consults.    PFT - to stage/Dx COPD     Reconciled RT Home Medications:  After evaluation the patient's cardiopulmonary status, it is the  recommendation of the care coordinator that the patient receive the following medication changes or additions.    No changes    Prior COPD Education?   No  Prior Pulmonary Rehab?   No  History of COPD admission in the past year?    No    Other Notes: Pt is currently not appropriate for UT/COPD education. She is currently confused and agitated. She has a family member at bedside. Earlier this morning, pt was trying to leave AMA. Pt has recently quit smoking. She does have a hx of narcotic dependency. Pt does not currently see a pulmonologist. Will follow back up again tomorrow to see if pt's mental status has improved.    Insurance: Payor: [place] / Plan: [place] / Product Type: *No Product type* /        EDUCATION DONE WITH PATIENT:       Unable to educate due to mental status    STOP BANG (=/> 3 is HIGH RISK):   Unable to assess at this time due to pt's mental status.

## 2020-07-27 NOTE — PROGRESS NOTES
Discharge Planning Assessment  [facility]     Patient Name: Smita Steele  MRN: [id number]  Today's Date: 7/27/2020    Admit Date: 7/25/2020    Discharge Needs Assessment     Row Name 07/27/20 1217       Living Environment    Lives With  child(felicia), adult Spoke to daughter Lor per phone due to Covid process - She lives with patient     Current Living Arrangements  home/apartment/condo    Primary Care Provided by  self;child(felicia)    Able to Return to Prior Arrangements  -- Pending PT and OT Lalo        Resource/Environmental Concerns    Resource/Environmental Concerns  none    Transportation Concerns  car, none       Transition Planning    Patient/Family Anticipates Transition to  home with family    Patient/Family Anticipated Services at Transition  none    Transportation Anticipated  family or friend will provide       Discharge Needs Assessment    Concerns to be Addressed  discharge planning    Equipment Currently Used at Home  wheelchair        Discharge Plan     Row Name 07/27/20 1222       Plan    Plan  Pending PT and OT Lalo            PCP - daughter states patient has a PCP but she doesn't remember the name    Confirmed Pharmacy     Demographic Summary     Row Name 07/27/20 1216       General Information    Admission Type  observation    Arrived From  emergency department    Referral Source  admission list    Reason for Consult  discharge planning    Preferred Language  English        Functional Status     Row Name 07/27/20 1217       Functional Status, IADL    Medications  independent    Meal Preparation  independent    Housekeeping  independent    Laundry  independent    Shopping  independent        DC Barriers - Pending PT and OT Lalo Mercado RN, CM  Office Phone [phone]  Cell [phone]

## 2020-07-27 NOTE — NURSING NOTE
"Placed call to MD regarding pt pain, informed pt that due to her condition when she was admitted and her abuse of her narcotics that MD will not order her lot of pain meds so pt became upset and rude, cursing at me and stated \"get her damn papers ready so she can get the fuck out of here, because she will not take this shit\" pt is calling her ride to leave AMA.  "

## 2020-07-27 NOTE — PLAN OF CARE
Pt has been a/o x 3-4, her v/s stable and pt has been very anxious, agitated, and rude with staff. Pt has refused multiple treatments and any assistance from staff, pt was given ativan one time for anxiety and has sense not wanted other meds because she is upset that she is not able to get more pain meds. Pt is free of falls with call light in reach, will continue to monitor.

## 2020-07-27 NOTE — NURSING NOTE
Pt is upset, agitated and refuses her bed alarm. She doesn't want to have to wait on anyone to assist her because she can do it herself even with a broken leg. I informed her the reason the bed alarm is on to ensure her safety and that it alerts us that she may need our assistance so she will not fall and pt states she doesn't care and that she will not fall.

## 2020-07-27 NOTE — DISCHARGE SUMMARY
[facility] Medicine Services  DISCHARGE SUMMARY        Prepared For PCP:  Provider, No Known    Patient Name: Smita Steele  : 1965  MRN: [id number]      Date of Admission:   2020    Date of Discharge:  2020    Length of stay:  LOS: 0 days     Hospital Course     Presenting Problem:   Altered mental status, unspecified altered mental status type [R41.82]      Active Hospital Problems    Diagnosis  POA   • Diabetes (CMS/[facility]) [E11.9]  Yes   • COPD (chronic obstructive pulmonary disease) (CMS/[facility]) [J44.9]  Yes   • Depression [F32.9]  Yes   • Acquired hypothyroidism [E03.9]  Yes   • Alcoholism /alcohol abuse (CMS/[facility]) [F10.20]  Yes   • Tobacco abuse [Z72.0]  Yes      Resolved Hospital Problems    Diagnosis Date Resolved POA   • **Altered mental status [R41.82] 2020 Yes   • BRITTANY (acute kidney injury) (CMS/[facility]) [N17.9] 2020 Yes           Hospital Course:  Smita Steele is a 54 y.o. female with past medical history of hypothyroidism, alcohol abuse, COPD, depression, diabetes and tobacco abuse who presents to [facility] with an altered mental status.  EMS reports that patient's daughter last spoke with her at approximately 1500 hrs. on 2020 and she seemed normal.  Patient failed to answer her phone later and EMS was called to check on patient.  She initially would not answer the door when they arrived and when they make contact they noted she was alert and oriented x2.  At that time they reported patient was able to walk to the stretcher with a normal gait.  In the ED patient continues to have some worsening confusion and agitation.  At the time of my exam she is alert but does not appear oriented to person place or time.  She seems to have trouble focusing on questions and will often answer inappropriately (answer yes and no to open-ended questions or just say I do not know).  She does seem to have trouble cooperating with the exam failing to  follow some commands and during an attempt to evaluate bilateral strength and movement patient appears somewhat afraid and pulls away curling up on the stretcher.  She does seem to have equal movements bilaterally although some regular jerking clonic motions are noted throughout the exam.     In the ED patient found to have labs significant for glucose: 215, creatinine: 1.05 with a BUN of 21, remainder of C CMP generally unremarkable.  Platelets: 466, remainder of CBC generally unremarkable.  Ammonia: 21, PT: 9.7, INR: 0.91, PTT: 24.4, acetaminophen level less than 5.0, salicylate level: Less than 0.3, ethanol level less than 0.010, acetone negative.  UA unremarkable.  Urine tox positive for opiates otherwise unremarkable.  CT of head shows no acute intracranial process with some volume loss.  COVID-19 test negative.  EKG shows sinus rhythm at 87 without obvious acute ST changes or ectopy and a QTC of 491 ms.    Neurology was consulted and felt the patient had no neurologic event and most likely had lost consciousness after an episode of hypoglycemia.  He termed it a syncopal episode with metabolic encephalopathy.  The patient also agreed with these findings and was in her normal state of health by the next day.  She is discharge home in good condition with instructions to follow-up as noted below.        Recommendation for Outpatient Providers:             Reasons For Change In Medications and Indications for New Medications:        Day of Discharge     HPI: Patient denies any further chest pain or shortness of air. She has no confusion at this time.    Vital Signs:   Temp:  [98.4 °F (36.9 °C)-98.8 °F (37.1 °C)] 98.6 °F (37 °C)  Heart Rate:  [] 92  Resp:  [12-23] 16  BP: (113-139)/(60-81) 123/64     Physical Exam:  General: well-developed and well-nourished, NAD  HEENT: NC/AT, EOMI, PERRLA  Heart: RRR. No murmur   Chest: CTAB, no w/r/r, normal respiratory effort  Abdominal: Soft. NT/ND. Bowel sounds  present  Musculoskeletal: Normal ROM.  No edema. No calf tenderness.  Neurological: AAOx3, no focal deficits  Skin: Skin is warm and dry. No rash  Psychiatric: Normal mood and affect    Pertinent  and/or Most Recent Results     Results from last 7 days   Lab Units 07/27/20  0301 07/26/20  0614 07/26/20  0421 07/25/20  2349   WBC 10*3/mm3 7.40  --  10.50 10.50   HEMOGLOBIN g/dL 11.8*  --  12.4 12.3   HEMATOCRIT % 34.4  --  36.9 37.1   PLATELETS 10*3/mm3 430  --  449 466*   SODIUM mmol/L 144 147*  --  144   POTASSIUM mmol/L 3.3* 4.2  --  4.1   CHLORIDE mmol/L 106 106  --  104   CO2 mmol/L 25.0 28.0  --  25.0   BUN  12 15  --  21*   CREATININE mg/dL 0.70 0.70  --  1.05*   GLUCOSE mg/dL 249* 175*  --  214*   CALCIUM mg/dL 9.1 9.6  --  9.7     Results from last 7 days   Lab Units 07/25/20  2349   BILIRUBIN mg/dL <0.2   ALK PHOS U/L 132*   ALT (SGPT) U/L 30   AST (SGOT) U/L 17   PROTIME Seconds 9.7   INR  0.91   APTT seconds 24.4     Results from last 7 days   Lab Units 07/26/20  0421   CHOLESTEROL mg/dL 197   TRIGLYCERIDES mg/dL 238*   HDL CHOL mg/dL 40     Results from last 7 days   Lab Units 07/26/20  0421 07/25/20  2332   TSH uIU/mL 1.390  --    HEMOGLOBIN A1C % 6.5*  --    LACTATE mmol/L  --  1.5       Brief Urine Lab Results  (Last result in the past 365 days)      Color   Clarity   Blood   Leuk Est   Nitrite   Protein   CREAT   Urine HCG        07/25/20 2349 Yellow Clear Negative Negative Negative Negative               Microbiology Results Abnormal     Procedure Component Value - Date/Time    Blood Culture - Blood, Arm, Right [[id number]]  (Abnormal) Collected:  07/25/20 2351    Lab Status:  Preliminary result Specimen:  Blood from Arm, Right Updated:  07/27/20 1124     Blood Culture Abnormal Stain     Gram Stain Aerobic Bottle Gram positive cocci    Blood Culture ID, PCR - Blood, Arm, Right [[id number]]  (Abnormal) Collected:  07/25/20 3821    Lab Status:  Final result Specimen:  Blood from Arm, Right Updated:   07/27/20 1124     BCID, PCR Negative by BCID PCR. Culture to Follow.     BOTTLE TYPE Aerobic Bottle    Blood Culture - Blood, Arm, Right [[id number]] Collected:  07/25/20 2349    Lab Status:  Preliminary result Specimen:  Blood from Arm, Right Updated:  07/27/20 0645     Blood Culture No growth at 24 hours    COVID-19, [facility]OTT IN-HOUSE,NP Swab (NO TRANSPORT MEDIA) 2 HR TAT - Swab, Nasopharynx [[id number]]  (Normal) Collected:  07/25/20 2349    Lab Status:  Final result Specimen:  Swab from Nasopharynx Updated:  07/26/20 0039     COVID19 Not Detected    Narrative:       Fact sheet for providers: https://www.fda.gov/media/479518/download     Fact sheet for patients: https://www.fda.gov/media/071250/download          Xr Ankle 3+ View Left    Result Date: 7/16/2020  Impression:  1.  Acute oblique nondisplaced fracture of the distal fibular diaphysis.   Electronically Signed By-Dmitry Cartagena On:7/16/2020 7:19 AM This report was finalized on [id number] by  Dmitry Cartagena, .    Ct Head Without Contrast    Result Date: 7/25/2020  Impression: 1. No acute intracranial process. Volume loss.  MRI is more sensitive for the detection of acute nonhemorrhagic infarct. 2. Paranasal sinus disease. Secretions could represent acute sinusitis.  Electronically signed by:  Hermes Ash M.D.  7/25/2020 11:38 PM    Mri Brain Without Contrast    Result Date: 7/26/2020  Impression: Mild paranasal sinus disease without air-fluid levels No acute intracranial abnormality.  Electronically Signed By-Neville Yanes DO. On:7/26/2020 12:36 PM This report was finalized on [id number] by  Neville Yanes DO..                             Test Results Pending at Discharge   Order Current Status    Blood Culture - Blood, Arm, Right Preliminary result    Blood Culture - Blood, Arm, Right Preliminary result            Procedures Performed           Consults:   Consults     Date and Time Order Name Status Description    7/27/2020 0728 Inpatient  Neurology Consult General      7/26/2020 0148 Inpatient Hospitalist Consult Completed             Discharge Details        Discharge Medications      New Medications      Instructions Start Date   thiamine 100 MG tablet  Commonly known as:  VITAMIN B1   100 mg, Oral, Daily         Changes to Medications      Instructions Start Date   Insulin Glargine 100 UNIT/ML injection pen  Commonly known as:  LANTUS SOLOSTAR  What changed:  when to take this   20 Units, Subcutaneous, Daily      insulin lispro 100 UNIT/ML injection  Commonly known as:  humaLOG  What changed:    · how much to take  · additional instructions   0-7 Units, Subcutaneous, 3 Times Daily With Meals         Continue These Medications      Instructions Start Date   acetaminophen 325 MG tablet  Commonly known as:  TYLENOL   No dose, route, or frequency recorded.      albuterol sulfate  (90 Base) MCG/ACT inhaler  Commonly known as:  PROVENTIL HFA;VENTOLIN HFA;PROAIR HFA   2 puffs, Inhalation, Every 4 Hours PRN      budesonide-formoterol 160-4.5 MCG/ACT inhaler  Commonly known as:  SYMBICORT   2 puffs, Inhalation, 2 Times Daily - RT      escitalopram 10 MG tablet  Commonly known as:  LEXAPRO   10 mg, Oral, Daily      gabapentin 300 MG capsule  Commonly known as:  NEURONTIN   300 mg, Oral, 4 Times Daily      glucose blood test strip  Commonly known as:  Accu-Chek Guide   1 each, Other, 4 Times Daily Before Meals & Nightly, Dx: E10.65. Use as instructed      HYDROcodone-acetaminophen 7.5-325 MG per tablet  Commonly known as:  Norco   1-2 tablets, Oral, Every 6 Hours PRN      levothyroxine 25 MCG tablet  Commonly known as:  SYNTHROID, LEVOTHROID   25 mcg, Oral, Daily      loperamide 2 MG capsule  Commonly known as:  IMODIUM   No dose, route, or frequency recorded.      meloxicam 7.5 MG tablet  Commonly known as:  MOBIC   7.5 mg, Oral, Daily      metFORMIN 1000 MG tablet  Commonly known as:  GLUCOPHAGE   1,000 mg, Oral, 2 Times Daily With Meals       promethazine 25 MG tablet  Commonly known as:  PHENERGAN   25 mg, Oral, Every 6 Hours PRN      Thera tablet tablet   1 tablet, Oral, Daily      tiotropium 18 MCG per inhalation capsule  Commonly known as:  Spiriva HandiHaler   1 capsule, Inhalation, Daily - RT      Vitamin D (Cholecalciferol) 25 MCG (1000 UT) capsule   Oral      Wixela Inhub 100-50 MCG/DOSE DISKUS  Generic drug:  fluticasone-salmeterol   1 puff, Inhalation, 2 Times Daily - RT             Allergies   Allergen Reactions   • Penicillins Unknown - High Severity         Discharge Disposition:  Home-Health Care [facility]    Diet:  Hospital:  Diet Order   Procedures   • Diet Diabetic/Consistent Carbs; Diabetic - Consistent Carb         Discharge Activity:   Activity Instructions     Activity as Tolerated              CODE STATUS:    Code Status and Medical Interventions:   Ordered at: 07/26/20 0221     Code Status:    CPR     Medical Interventions (Level of Support Prior to Arrest):    Full         Follow-up Appointments  Future Appointments   Date Time Provider Department Center   11/2/2020  8:30 AM LAB  [facility] None   11/9/2020  1:20 PM José Miguel Regalado MD [facility] None       Additional Instructions for the Follow-ups that You Need to Schedule     Call MD With Problems / Concerns   As directed      Instructions: Call [phone] or email [email or web address] for problems or concerns.    Order Comments:  Instructions: Call [phone] or email [email or web address] for problems or concerns.          Discharge Follow-up with PCP   As directed       Currently Documented PCP:    Provider, No Known    PCP Phone Number:    None     Follow Up Details:  1 week         Discharge Follow-up with Specialty: Endocrinology and [facility]   As directed      Specialty:  [facility]inology [facility]    Follow Up Details:  as previously scheduled                 Condition on Discharge:      Stable      This patient has been examined  wearing appropriate Personal Protective Equipment 07/27/20      Electronically signed by Neema Mccabe MD, 07/27/20, 1:29 PM.      Time: I spent  25  minutes on this discharge activity which included face-to-face encounter with the patient/reviewing the data in the system/coordination of the care with the nursing staff as well as consultants/documentation/entering orders.

## 2020-07-27 NOTE — PLAN OF CARE
Problem: Patient Care Overview  Goal: Plan of Care Review  Outcome: Ongoing (interventions implemented as appropriate)  Flowsheets (Taken 7/27/2020 1053)  Plan of Care Reviewed With: patient  Outcome Summary: Pt participated in swallow re-eval this date. Following swallow eval on 7/26, Recommend pt be NPO with only sips of water. Pt placed on consitent carb diet on 7/26 per NP. Pt given trials of water by straw, puree, mechanical soft, and regular solids. Oral phase characterized by adequate mastication and bolus control with no anterior labial spillage, pocketing or residue noted. No overt s/s of aspiration observed at any time. Pt's vocal quality remained clear throughout evaluation. Recommend pt continue regular and thin liquid diet. ST will f/u x1 to ensure diet tolerance. PPE worn: gloves, mask, and glasses

## 2020-07-27 NOTE — PROGRESS NOTES
Continued Stay Note   Raad     Patient Name: Smita Steele  MRN: [id number]  Today's Date: 7/27/2020    Admit Date: 7/25/2020    Discharge Plan     Row Name 07/27/20 1331       Plan    Plan  Anticipate return home w/ daughter & home health. Current w/ [facility] for PCP. HHC arranged by [facility] NP.     Plan Comments  SW met w/ pt and daughter (Lor) at bedside wearing appropriate PPE (mask & goggles) and maintaining a distance of at least 6 feet throughout encounter. Pt told SW that she was recently discharged from [facility] where she spent approximately 4 months in rehab. Pt lives at home with her daughter (Lor) now and receives primary care services through [facility] (recently had a PCP visit from Erwin Michele ARNP). Pt reported that [facility] is setting up HHC PT and home caregiver services through the Medicaid waiver. Pt could not remember the name of the agency for either service. Pt told SW that she is current w/ [facility] pharmacy and denies any difficulty obtaining her medications. Pt has a wheelchair at home. Pt & dtr do not anticipate any d/c needs at this time. SW also discussed ETOH use with pt. Pt reported that she has not had anything to drink in approximately 5 months. Pt told SW that she had been drinking previously due to grief over the loss of her father. Pt denies any issue with ETOH use at this time. Pt and dtr denied any further needs. SW notified CM of pending HHC referral made by pt's PCP. CM to determine which agency provides pt's HHC services and assist with MARIA L order if needed.      Alayna Gómez, JOSEPHW, LSW  PRN   Phone: [phone]

## 2020-07-27 NOTE — NURSING NOTE
Placed call to MD regarding pt agitation with being in the hospital, pt is very upset and wants to leave AMA. I tried to talk to her about why it is important for her to stay throughout the night. Odette MEEKS came up to speak with her and talked her into staying and placed a order for her a diet.    Placed another call to MD regarding pt pain and was given a one time dose of main meds.

## 2020-07-27 NOTE — NURSING NOTE
Per Leatha Mercado, ok to discharge patient, home healthcare is being consulted as recommended by OT.

## 2020-07-27 NOTE — CONSULTS
Chief Complaint:    Evaluate for syncopal episode and possible seizure.     HPI:  The patient is a 54 year old lady with multiple medical problems including brittle diabetes mellitus, Lupus who was brought to ER of [facility] two days ago secondary to confusion and mental status changes.  She had talked to her daughter in the evening.  Upon arrival of EMS she was able to ambulate to the stretcher.  Afterwards she got more confused.  The patient states that her blood sugar was high above 300. She took 11 units of insulin and it bottoms out to may be 100.  She lost consciousness at that time.  She states that her blood sugar fluctuates a lot even on taking small amount of food.  The patient denies any focal weakness, history of seizures, bowel and bladder incontinence, speech and swallowing problems.      ROS: Constitutional: no weakness, no CP, no SOA, no bowel and bladder problems, no skin rash, no dysuria.  .      PMH:  Past Medical History:   Diagnosis Date   • Diabetes mellitus type I (CMS/[facility])    • Lupus (CMS/[facility])      Social History     Socioeconomic History   • Marital status: Single     Spouse name: Not on file   • Number of children: Not on file   • Years of education: Not on file   • Highest education level: Not on file   Tobacco Use   • Smoking status: Former Smoker     Packs/day: 0.50   • Smokeless tobacco: Never Used   Substance and Sexual Activity   • Alcohol use: Yes     Alcohol/week: 4.0 standard drinks     Types: 4 Shots of liquor per week     Comment: a day   • Drug use: Never   • Sexual activity: Defer     Past Surgical History:   Procedure Laterality Date   •  SECTION     • CHOLECYSTECTOMY     • GALLBLADDER SURGERY     • HYSTERECTOMY       Family History   Problem Relation Age of Onset   • Thyroid disease Mother    • Cancer Father        Labs:  Lab Results (last 24 hours)     Procedure Component Value Units Date/Time    POC Glucose Once [[id number]]  (Abnormal) Collected:  20 1140     Specimen:  Blood Updated:  07/27/20 1145     Glucose 167 mg/dL      Comment: Serial Number: [id number]:  [id number]       Hemoglobin A1c [[id number]]  (Abnormal) Collected:  07/26/20 0421    Specimen:  Blood Updated:  07/27/20 1138     Hemoglobin A1C 6.5 %     Narrative:       Hemoglobin A1C Reference Range:    <5.7 %        Normal  5.7-6.4 %     Increased risk for diabetes  > 6.4 %        Diabetes       These guidelines have been recommended by the American Diabetic Association for Hgb A1c.      The following 2010 guidelines have been recommended by the American Diabetes Association for Hemoglobin A1c.    HBA1c 5.7-6.4% Increased risk for future diabetes (pre-diabetes)  HBA1c     >6.4% Diabetes      Blood Culture - Blood, Arm, Right [[id number]]  (Abnormal) Collected:  07/25/20 2351    Specimen:  Blood from Arm, Right Updated:  07/27/20 1124     Blood Culture Abnormal Stain     Gram Stain Aerobic Bottle Gram positive cocci    Blood Culture ID, PCR - Blood, Arm, Right [[id number]]  (Abnormal) Collected:  07/25/20 2351    Specimen:  Blood from Arm, Right Updated:  07/27/20 1124     BCID, PCR Negative by BCID PCR. Culture to Follow.     BOTTLE TYPE Aerobic Bottle    BUN [[id number]]  (Normal) Collected:  07/27/20 0301    Specimen:  Blood Updated:  07/27/20 0734     BUN 12 mg/dL     POC Glucose Once [[id number]]  (Abnormal) Collected:  07/27/20 0729    Specimen:  Blood Updated:  07/27/20 0733     Glucose 208 mg/dL      Comment: Serial Number: [id number]:  [id number]       Blood Culture - Blood, Arm, Right [[id number]] Collected:  07/25/20 2349    Specimen:  Blood from Arm, Right Updated:  07/27/20 0645     Blood Culture No growth at 24 hours    Basic Metabolic Panel [[id number]]  (Abnormal) Collected:  07/27/20 0301    Specimen:  Blood Updated:  07/27/20 0356     Glucose 249 mg/dL      BUN --     Comment: Testing performed by alternate method        Creatinine 0.70 mg/dL      Sodium 144 mmol/L      Potassium 3.3  mmol/L      Chloride 106 mmol/L      CO2 25.0 mmol/L      Calcium 9.1 mg/dL      eGFR Non African Amer 87 mL/min/1.73      BUN/Creatinine Ratio --     Comment: Testing not performed        Anion Gap 13.0 mmol/L     Narrative:       GFR Normal >60  Chronic Kidney Disease <60  Kidney Failure <15      Magnesium [[id number]]  (Normal) Collected:  07/27/20 0301    Specimen:  Blood Updated:  07/27/20 0356     Magnesium 1.9 mg/dL     Phosphorus [[id number]]  (Normal) Collected:  07/27/20 0301    Specimen:  Blood Updated:  07/27/20 0356     Phosphorus 3.6 mg/dL     CBC & Differential [[id number]] Collected:  07/27/20 0301    Specimen:  Blood Updated:  07/27/20 0336    Narrative:       The following orders were created for panel order CBC & Differential.  Procedure                               Abnormality         Status                     ---------                               -----------         ------                     CBC Auto Differential[[id number]]        Abnormal            Final result                 Please view results for these tests on the individual orders.    CBC Auto Differential [[id number]]  (Abnormal) Collected:  07/27/20 0301    Specimen:  Blood Updated:  07/27/20 0336     WBC 7.40 10*3/mm3      RBC 3.68 10*6/mm3      Hemoglobin 11.8 g/dL      Hematocrit 34.4 %      MCV 93.5 fL      MCH 32.0 pg      MCHC 34.2 g/dL      RDW 13.4 %      RDW-SD 44.2 fl      MPV 8.8 fL      Platelets 430 10*3/mm3      Neutrophil % 52.1 %      Lymphocyte % 37.6 %      Monocyte % 7.5 %      Eosinophil % 2.5 %      Basophil % 0.3 %      Neutrophils, Absolute 3.90 10*3/mm3      Lymphocytes, Absolute 2.80 10*3/mm3      Monocytes, Absolute 0.60 10*3/mm3      Eosinophils, Absolute 0.20 10*3/mm3      Basophils, Absolute 0.00 10*3/mm3      nRBC 0.0 /100 WBC     POC Glucose Once [[id number]]  (Abnormal) Collected:  07/26/20 2000    Specimen:  Blood Updated:  07/26/20 2001     Glucose 173 mg/dL      Comment: Serial Number:  [id number]:  [id number]       POC Glucose Once [[id number]]  (Abnormal) Collected:  07/26/20 1642    Specimen:  Blood Updated:  07/26/20 1643     Glucose 119 mg/dL      Comment: Serial Number: [id number]:  [id number]               Medications:  Schedule Meds    budesonide-formoterol 2 puff Inhalation BID - RT   escitalopram 10 mg Oral Daily   insulin lispro 0-14 Units Subcutaneous TID AC   ipratropium-albuterol 3 mL Nebulization 4x Daily - RT   levothyroxine 25 mcg Oral Daily   nicotine 1 patch Transdermal Daily   sodium chloride 10 mL Intravenous Q12H   Thera 1 tablet Oral Daily   thiamine (VITAMIN B1) IVPB 500 mg Intravenous TID         MED'S PRN  •  acetaminophen **OR** acetaminophen **OR** acetaminophen  •  calcium carbonate  •  Calcium Gluconate-NaCl **AND** calcium gluconate **AND** Calcium, Ionized  •  dextrose  •  dextrose  •  docusate sodium  •  glucagon (human recombinant)  •  HYDROcodone-acetaminophen  •  insulin lispro **AND** insulin lispro  •  loperamide  •  LORazepam **OR** LORazepam **OR** LORazepam **OR** LORazepam **OR** LORazepam **OR** LORazepam  •  magnesium sulfate **OR** magnesium sulfate **OR** magnesium sulfate  •  melatonin  •  nitroglycerin  •  ondansetron **OR** ondansetron  •  potassium & sodium phosphates **OR** potassium & sodium phosphates  •  potassium chloride  •  potassium chloride  •  promethazine  •  sodium chloride    Vitals:  Vitals:    07/27/20 0739   BP:    Pulse: 92   Resp: 16   Temp:    SpO2: 98%         Physical Exam:  The patient is lying in bed in no apparent distress.  Head NC, Neck supple, lungs CTA,  CV  S1-S2. Abdomen soft.  Ext no edema.   Neurologic Exam:  The patient is awake, alert, oriented x 3, speech is good, follow commands., name common objects.  CN EOMI, no facial droop. Motor moves all extremities against gravity.  Reflexes absent, plantar mute.  Cerebellum finger to nose intact.      Impression:  The patient is a 54 year old lady with multiple  medical problems including brittle diabetes mellitus on insulin who had an episode of loss of consciousness and confusion after hypoglycemia.  The clinical features are suggestive of syncopal episode and metabolic encephalopathy.      Recomendations:  Will continue supportive care.  Improvement of metabolic status will likely improve her condition.  Will sign off. Please call for further assistance.

## 2020-07-27 NOTE — PLAN OF CARE
NIH = 0    Patient in pain this morning in the left leg. 7/10.  Norco administered.  Patient is anxious, ativan administered.    Recent falls, agitation, sensory perception altered, patient remains a fall risk.  CIWA = 10 this am.  COWS = 13 this am.  Problem: Fall Risk (Adult)  Goal: Identify Related Risk Factors and Signs and Symptoms  Outcome: Ongoing (interventions implemented as appropriate)  Flowsheets (Taken 7/27/2020 0852)  Related Risk Factors (Fall Risk): confusion/agitation;gait/mobility problems;history of falls;sensory deficits;environment unfamiliar  Goal: Absence of Fall  Outcome: Ongoing (interventions implemented as appropriate)  Goal: Identify Related Risk Factors and Signs and Symptoms  Outcome: Ongoing (interventions implemented as appropriate)  Goal: Absence of Fall  Outcome: Ongoing (interventions implemented as appropriate)     Problem: Patient Care Overview  Goal: Plan of Care Review  Outcome: Ongoing (interventions implemented as appropriate)  Goal: Individualization and Mutuality  Outcome: Ongoing (interventions implemented as appropriate)  Goal: Discharge Needs Assessment  Outcome: Ongoing (interventions implemented as appropriate)  Goal: Interprofessional Rounds/Family Conf  Outcome: Ongoing (interventions implemented as appropriate)     Problem: Stroke (Ischemic) (Adult)  Goal: Signs and Symptoms of Listed Potential Problems Will be Absent, Minimized or Managed (Stroke)  Outcome: Ongoing (interventions implemented as appropriate)     Problem: Alcohol Withdrawal Acute, Risk/Actual (Adult)  Goal: Signs and Symptoms of Listed Potential Problems Will be Absent, Minimized or Managed (Alcohol Withdrawal Acute, Risk/Actual)  Outcome: Ongoing (interventions implemented as appropriate)     Problem: Opioid Dependence/Withdrawal (Adult)  Goal: Identify Related Risk Factors and Signs and Symptoms  Outcome: Ongoing (interventions implemented as appropriate)  Goal: Withdrawal Symptoms Managed or  Absent  Outcome: Ongoing (interventions implemented as appropriate)  Goal: Psychosocial Wellbeing  Outcome: Ongoing (interventions implemented as appropriate)

## 2020-07-27 NOTE — THERAPY EVALUATION
Acute Care - Occupational Therapy Initial Evaluation   [facility]     Patient Name: Smita Steele  : 1965  MRN: [id number]  Today's Date: 2020             Admit Date: 2020       ICD-10-CM ICD-9-CM   1. Altered mental status, unspecified altered mental status type R41.82 780.97     Patient Active Problem List   Diagnosis   • Hypokalemia, inadequate intake   • Hyperglycemia   • Tobacco abuse   • Alcoholism /alcohol abuse (CMS/HCC)   • Hyponatremia   • Transaminitis   • Insomnia   • Hypophosphatemia   • Starvation ketoacidosis   • Type 1 diabetes mellitus with hyperglycemia (CMS/HCC)   • Acquired hypothyroidism   • Diabetes (CMS/HCC)   • COPD (chronic obstructive pulmonary disease) (CMS/HCC)   • Depression     Past Medical History:   Diagnosis Date   • Diabetes mellitus type I (CMS/HCC)    • Lupus (CMS/HCC)      Past Surgical History:   Procedure Laterality Date   •  SECTION     • CHOLECYSTECTOMY     • GALLBLADDER SURGERY     • HYSTERECTOMY            OT ASSESSMENT FLOWSHEET (last 12 hours)      Occupational Therapy Evaluation     Row Name 20 1400                   OT Evaluation Time/Intention    Subjective Information  no complaints  -MP        Document Type  evaluation  -MP        Mode of Treatment  individual therapy  -MP        Patient Effort  good  -MP           General Information    Patient Profile Reviewed?  yes  -MP        Patient Observations  alert;agree to therapy;cooperative  -MP        Patient/Family Observations  Pt. supine in bed  -MP        Prior Level of Function  independent:;ADL's  -MP        Existing Precautions/Restrictions  fall;left;non-weight bearing  -MP           Relationship/Environment    Primary Source of Support/Comfort  child(felicia)  -MP        Lives With  child(felicia), adult  -MP           Resource/Environmental Concerns    Current Living Arrangements  home/apartment/condo  -MP        Resource/Environmental Concerns  none  -MP           Cognitive  Assessment/Intervention- PT/OT    Orientation Status (Cognition)  oriented x 3  -MP        Follows Commands (Cognition)  WNL  -MP        Safety Deficit (Cognitive)  mild deficit;insight into deficits/self awareness;judgment;problem solving  -MP           Bed Mobility Assessment/Treatment    Bed Mobility Assessment/Treatment  supine-sit;sit-supine  -MP        Supine-Sit [place] (Bed Mobility)  conditional independence  -MP        Sit-Supine [place] (Bed Mobility)  conditional independence  -MP           Functional Mobility    Functional Mobility- Ind. Level  contact guard assist;1 person  -MP        Functional Mobility- Device  rolling walker  -MP           Transfer Assessment/Treatment    Transfer Assessment/Treatment  sit-stand transfer  -MP           Sit-Stand Transfer    Sit-Stand [place] (Transfers)  contact guard;1 person assist  -MP        Assistive Device (Sit-Stand Transfers)  walker, front-wheeled  -MP           ADL Assessment/Intervention    BADL Assessment/Intervention  lower body dressing  -MP           Lower Body Dressing Assessment/Training    Lower Body Dressing [place] Level  don;socks;set up  -MP           General ROM    GENERAL ROM COMMENTS  BUE WFL  -MP           MMT (Manual Muscle Testing)    General MMT Comments  BUE WFL  -MP           Positioning and Restraints    Pre-Treatment Position  in bed  -MP        Post Treatment Position  bed  -MP        In Bed  call light within reach;encouraged to call for assist;exit alarm on  -MP           Pain Assessment    Additional Documentation  Pain Scale: Word Pre/Post-Treatment (Group)  -MP           Pain Scale: Word Pre/Post-Treatment    Pain: Word Scale, Pretreatment  2 - mild pain  -MP        Pain: Word Scale, Post-Treatment  2 - mild pain  -MP        Pre/Post Treatment Pain Comment  LLE  -MP           Plan of Care Review    Plan of Care Reviewed With  patient  -MP        Progress  no change  -MP           Clinical Impression (OT)     Criteria for Skilled Therapeutic Interventions Met (OT Eval)  yes;treatment indicated  -MP        Rehab Potential (OT Eval)  good, to achieve stated therapy goals  -MP        Therapy Frequency (OT Eval)  3 times/wk  -MP        Care Plan Review (OT)  evaluation/treatment results reviewed  -MP        Anticipated Equipment Needs at Discharge (OT)  two wheeled walker  -MP        Anticipated Discharge Disposition (OT)  home with home health  -MP           OT Goals    Bed Mobility Goal Selection (OT)  bed mobility, OT goal 1  -MP        Transfer Goal Selection (OT)  transfer, OT goal 1  -MP        Dressing Goal Selection (OT)  dressing, OT goal 1  -MP        Toileting Goal Selection (OT)  toileting, OT goal 1  -MP           Transfer Goal 1 (OT)    Activity/Assistive Device (Transfer Goal 1, OT)  sit-to-stand/stand-to-sit;toilet  -MP        [place] Level/Cues Needed (Transfer Goal 1, OT)  supervision required;1 person assist  -MP        Time Frame (Transfer Goal 1, OT)  long term goal (LTG);2 weeks  -MP           Dressing Goal 1 (OT)    Activity/Assistive Device (Dressing Goal 1, OT)  lower body dressing  -MP        [place]/Cues Needed (Dressing Goal 1, OT)  independent  -MP        Time Frame (Dressing Goal 1, OT)  long term goal (LTG);2 weeks  -MP           Toileting Goal 1 (OT)    Activity/Device (Toileting Goal 1, OT)  toileting skills, all  -MP        [place] Level/Cues Needed (Toileting Goal 1, OT)  supervision required;set-up required;1 person assist  -MP        Time Frame (Toileting Goal 1, OT)  long term goal (LTG);2 weeks  -MP          User Key  (r) = Recorded By, (t) = Taken By, (c) = Cosigned By    Initials Name Effective Dates    Kody Perry, OT 03/01/19 -          Occupational Therapy Education                 Title: PT OT SLP Therapies (In Progress)     Topic: Occupational Therapy (In Progress)     Point: ADL training (Not Started)     Description:   Instruct learner(s) on proper  safety adaptation and remediation techniques during self care or transfers.   Instruct in proper use of assistive devices.              Learner Progress:   Not documented in this visit.          Point: Home exercise program (Not Started)     Description:   Instruct learner(s) on appropriate technique for monitoring, assisting and/or progressing therapeutic exercises/activities.              Learner Progress:   Not documented in this visit.          Point: Precautions (Not Started)     Description:   Instruct learner(s) on prescribed precautions during self-care and functional transfers.              Learner Progress:   Not documented in this visit.          Point: Body mechanics (Done)     Description:   Instruct learner(s) on proper positioning and spine alignment during self-care, functional mobility activities and/or exercises.              Learning Progress Summary           Patient Acceptance, E,TB, VU by  at 7/27/2020 1432                               User Key     Initials Effective Dates Name Provider Type Discipline     03/01/19 -  Kody Godinez, OT Occupational Therapist OT                  OT Recommendation and Plan  Outcome Summary/Treatment Plan (OT)  Anticipated Equipment Needs at Discharge (OT): two wheeled walker  Anticipated Discharge Disposition (OT): home with home health  Therapy Frequency (OT Eval): 3 times/wk  Plan of Care Review  Plan of Care Reviewed With: patient  Plan of Care Reviewed With: patient        Time Calculation:   Time Calculation- OT     Row Name 07/27/20 1432             Time Calculation- OT    OT Start Time  1115  -      OT Stop Time  1135  -      OT Time Calculation (min)  20 min  -      Total Timed Code Minutes- OT  0 minute(s)  -      OT Received On  07/27/20  -      OT - Next Appointment  07/29/20  -      OT Goal Re-Cert Due Date  08/10/20  -        User Key  (r) = Recorded By, (t) = Taken By, (c) = Cosigned By    Initials Name Provider Type      Kody Godinez, KOURTNEY Occupational Therapist        Therapy Charges for Today     Code Description Service Date Service Provider Modifiers Qty    [id number] HC OT EVAL LOW COMPLEXITY 3 7/27/2020 Kody Godinez OT GO 1               Kody Godinez OT  7/27/2020

## 2020-07-27 NOTE — PROGRESS NOTES
Continued Stay Note   [facility]     Patient Name: Smita Steele  MRN: [id number]  Today's Date: 7/27/2020    Admit Date: 7/25/2020    Discharge Plan     Row Name 07/27/20 1536       Plan    Final Discharge Disposition Code  01 - home or self-care    Row Name 07/27/20 4261       Plan    Plan  Anticipate return home w/ daughter & home health. Current w/ [facility] for PCP. HHC arranged by [facility] NP.     Plan Comments  SW met w/ pt and daughter (Lor) at bedside wearing appropriate PPE (mask & goggles) and maintaining a distance of at least 6 feet throughout encounter. Pt told SW that she was recently discharged from [facility] where she spent approximately 4 months in rehab. Pt lives at home with her daughter (Lor) now and receives primary care services through [facility] (recently had a PCP visit from Erwin Michele ARNP). Pt reported that [facility] is setting up HHC PT and home caregiver services through the Medicaid waiver. Pt could not remember the name of the agency for either service. Pt told SW that she is current w/ [facility] pharmacy and denies any difficulty obtaining her medications. Pt has a wheelchair at home. Pt & dtr do not anticipate any d/c needs at this time. SW also discussed ETOH use with pt. Pt reported that she has not had anything to drink in approximately 5 months. Pt told SW that she had been drinking previously due to grief over the loss of her father. Pt denies any issue with ETOH use at this time. Pt and dtr denied any further needs. SW notified CM of pending HHC referral made by pt's PCP. CM to determine which agency provides pt's HHC services and assist with MARIA L order if needed.       Called [facility] and [facility] and patient is not current with either. Call placed to [facility] at [phone]. Message left to return call to see which HHC patient is to be set up with.        Expected Discharge Date and Time     Expected Discharge Date  Expected Discharge Time    Jul 27, 2020           Leatha Mercado RN, CM  Office Phone [phone]  Cell [phone]

## 2020-07-28 ENCOUNTER — READMISSION MANAGEMENT (OUTPATIENT)
Dept: CALL CENTER | Facility: HOSPITAL | Age: 55
End: 2020-07-28

## 2020-07-28 LAB
BACTERIA SPEC AEROBE CULT: ABNORMAL
GRAM STN SPEC: ABNORMAL
ISOLATED FROM: ABNORMAL

## 2020-07-28 NOTE — PROGRESS NOTES
Continued Stay Note   [facility]     Patient Name: Smita Steele  MRN: [id number]  Today's Date: 7/28/2020    Admit Date: 7/25/2020    Discharge Plan      Second call placed to [facility] at [phone] to inquire about HHC. Left message with nurse to return call.    Expected Discharge Date and Time     Expected Discharge Date Expected Discharge Time    Jul 27, 2020         Leatha Mercado RN, CM  Office Phone [phone]  Cell [phone]

## 2020-07-28 NOTE — OUTREACH NOTE
Prep Survey      Responses   [facility] facility patient discharged from?  [facility]   Is LACE score < 7 ?  No   Eligibility  Readm Mgmt   Discharge diagnosis  Altered mental status   COVID-19 Test Status  Negative   Does the patient have one of the following disease processes/diagnoses(primary or secondary)?  Other   Does the patient have Home health ordered?  No   Is there a DME ordered?  No   Comments regarding appointments  Per AVS   Prep survey completed?  Yes          Courtney Del Rosario RN

## 2020-07-29 ENCOUNTER — READMISSION MANAGEMENT (OUTPATIENT)
Dept: CALL CENTER | Facility: HOSPITAL | Age: 55
End: 2020-07-29

## 2020-07-29 NOTE — OUTREACH NOTE
Medical Week 1 Survey      Responses   [facility] patient discharged from?  [facility]   COVID-19 Test Status  Negative   Does the patient have one of the following disease processes/diagnoses(primary or secondary)?  Other   Is there a successful TCM telephone encounter documented?  No   Week 1 attempt successful?  No   Unsuccessful attempts  Attempt 1          Miranda Ramirez LPN

## 2020-07-29 NOTE — PROGRESS NOTES
Continued Stay Note  MICHAEL Shankar     Patient Name: Smita Steele  MRN: [id number]  Today's Date: 7/29/2020    Admit Date: 7/25/2020    Discharge Plan      Received a call back from [facility] stating that they are going to set patient up with [facility] for Nursing and PT      Expected Discharge Date and Time     Expected Discharge Date Expected Discharge Time    Jul 27, 2020         Leatha Mercado RN, CM  Office Phone [phone]  Cell [phone]

## 2020-07-31 ENCOUNTER — TELEPHONE (OUTPATIENT)
Dept: ENDOCRINOLOGY | Facility: CLINIC | Age: 55
End: 2020-07-31

## 2020-07-31 ENCOUNTER — READMISSION MANAGEMENT (OUTPATIENT)
Dept: CALL CENTER | Facility: HOSPITAL | Age: 55
End: 2020-07-31

## 2020-07-31 LAB — BACTERIA SPEC AEROBE CULT: NORMAL

## 2020-07-31 NOTE — OUTREACH NOTE
Medical Week 1 Survey      Responses   [facility] patient discharged from?  [facility]   COVID-19 Test Status  Negative   Does the patient have one of the following disease processes/diagnoses(primary or secondary)?  Other   Is there a successful TCM telephone encounter documented?  No   Week 1 attempt successful?  No   Unsuccessful attempts  Attempt 2          Miranda Ramirez LPN

## 2020-08-03 ENCOUNTER — READMISSION MANAGEMENT (OUTPATIENT)
Dept: CALL CENTER | Facility: HOSPITAL | Age: 55
End: 2020-08-03

## 2020-08-11 ENCOUNTER — READMISSION MANAGEMENT (OUTPATIENT)
Dept: CALL CENTER | Facility: HOSPITAL | Age: 55
End: 2020-08-11

## 2020-08-11 NOTE — OUTREACH NOTE
Medical Week 2 Survey      Responses   [facility] patient discharged from?  [facility]   COVID-19 Test Status  Negative   Does the patient have one of the following disease processes/diagnoses(primary or secondary)?  Other   Week 2 attempt successful?  No          Miranda Ramirez LPN

## 2020-08-23 ENCOUNTER — TELEPHONE (OUTPATIENT)
Dept: ENDOCRINOLOGY | Facility: CLINIC | Age: 55
End: 2020-08-23

## 2020-08-24 NOTE — TELEPHONE ENCOUNTER
Called answ serv with blood sugar 453 before dinner. Eats late.  Says blood sugars are running in mid 200's.  Was told to call if >400.  On Lantus 20 units QHS & Humalog 6 units ac plus sliding scale. Also back on metformin.  She had already taken the Humalog meal dose & sliding scale when I talked to her.  Next appt with Dr. Regalado is in November.  Plan:   Drink plenty of water.  Increase Lantus to 24 units.  Call blood sugars this coming week so adjustments can be made.

## 2020-10-27 ENCOUNTER — APPOINTMENT (OUTPATIENT)
Dept: GENERAL RADIOLOGY | Facility: HOSPITAL | Age: 55
End: 2020-10-27

## 2020-10-27 ENCOUNTER — HOSPITAL ENCOUNTER (OUTPATIENT)
Facility: HOSPITAL | Age: 55
Setting detail: OBSERVATION
Discharge: HOME | End: 2020-10-28
Attending: INTERNAL MEDICINE | Admitting: INTERNAL MEDICINE

## 2020-10-27 DIAGNOSIS — R05.9 COUGH: ICD-10-CM

## 2020-10-27 DIAGNOSIS — J44.1 COPD EXACERBATION (HCC): Primary | ICD-10-CM

## 2020-10-27 PROBLEM — E10.9 TYPE 1 DIABETES MELLITUS WITHOUT COMPLICATION (HCC): Chronic | Status: ACTIVE | Noted: 2020-07-26

## 2020-10-27 PROBLEM — R06.00 DYSPNEA: Status: ACTIVE | Noted: 2020-10-27

## 2020-10-27 PROBLEM — R07.9 CHEST PAIN: Status: ACTIVE | Noted: 2020-10-27

## 2020-10-27 PROBLEM — Z79.4 TYPE 2 DIABETES MELLITUS WITHOUT COMPLICATION, WITH LONG-TERM CURRENT USE OF INSULIN (HCC): Chronic | Status: ACTIVE | Noted: 2020-07-26

## 2020-10-27 LAB
ALBUMIN SERPL-MCNC: 4 G/DL (ref 3.5–5.2)
ALBUMIN/GLOB SERPL: 1.5 G/DL
ALP SERPL-CCNC: 139 U/L (ref 39–117)
ALT SERPL W P-5'-P-CCNC: 15 U/L (ref 1–33)
ANION GAP SERPL CALCULATED.3IONS-SCNC: 12 MMOL/L (ref 5–15)
AST SERPL-CCNC: 14 U/L (ref 1–32)
B PARAPERT DNA SPEC QL NAA+PROBE: NOT DETECTED
B PERT DNA SPEC QL NAA+PROBE: NOT DETECTED
BASOPHILS # BLD AUTO: 0 10*3/MM3 (ref 0–0.2)
BASOPHILS NFR BLD AUTO: 0.4 % (ref 0–1.5)
BILIRUB SERPL-MCNC: <0.2 MG/DL (ref 0–1.2)
BUN SERPL-MCNC: 18 MG/DL (ref 6–20)
BUN/CREAT SERPL: 29 (ref 7–25)
C PNEUM DNA NPH QL NAA+NON-PROBE: NOT DETECTED
CALCIUM SPEC-SCNC: 9 MG/DL (ref 8.6–10.5)
CHLORIDE SERPL-SCNC: 104 MMOL/L (ref 98–107)
CO2 SERPL-SCNC: 22 MMOL/L (ref 22–29)
CREAT SERPL-MCNC: 0.62 MG/DL (ref 0.57–1)
D DIMER PPP FEU-MCNC: 0.41 MG/L (FEU) (ref 0–0.59)
DEPRECATED RDW RBC AUTO: 44.2 FL (ref 37–54)
EOSINOPHIL # BLD AUTO: 0.3 10*3/MM3 (ref 0–0.4)
EOSINOPHIL NFR BLD AUTO: 3.2 % (ref 0.3–6.2)
ERYTHROCYTE [DISTWIDTH] IN BLOOD BY AUTOMATED COUNT: 13.4 % (ref 12.3–15.4)
FLUAV H1 2009 PAND RNA NPH QL NAA+PROBE: NOT DETECTED
FLUAV H1 HA GENE NPH QL NAA+PROBE: NOT DETECTED
FLUAV H3 RNA NPH QL NAA+PROBE: NOT DETECTED
FLUAV SUBTYP SPEC NAA+PROBE: NOT DETECTED
FLUBV RNA ISLT QL NAA+PROBE: NOT DETECTED
GFR SERPL CREATININE-BSD FRML MDRD: 100 ML/MIN/1.73
GLOBULIN UR ELPH-MCNC: 2.6 GM/DL
GLUCOSE BLDC GLUCOMTR-MCNC: 333 MG/DL (ref 70–105)
GLUCOSE SERPL-MCNC: 255 MG/DL (ref 65–99)
HADV DNA SPEC NAA+PROBE: NOT DETECTED
HCOV 229E RNA SPEC QL NAA+PROBE: NOT DETECTED
HCOV HKU1 RNA SPEC QL NAA+PROBE: NOT DETECTED
HCOV NL63 RNA SPEC QL NAA+PROBE: NOT DETECTED
HCOV OC43 RNA SPEC QL NAA+PROBE: NOT DETECTED
HCT VFR BLD AUTO: 39.6 % (ref 34–46.6)
HGB BLD-MCNC: 13.5 G/DL (ref 12–15.9)
HMPV RNA NPH QL NAA+NON-PROBE: NOT DETECTED
HOLD SPECIMEN: NORMAL
HOLD SPECIMEN: NORMAL
HPIV1 RNA SPEC QL NAA+PROBE: NOT DETECTED
HPIV2 RNA SPEC QL NAA+PROBE: NOT DETECTED
HPIV3 RNA NPH QL NAA+PROBE: NOT DETECTED
HPIV4 P GENE NPH QL NAA+PROBE: NOT DETECTED
LYMPHOCYTES # BLD AUTO: 3.7 10*3/MM3 (ref 0.7–3.1)
LYMPHOCYTES NFR BLD AUTO: 36.1 % (ref 19.6–45.3)
M PNEUMO IGG SER IA-ACNC: NOT DETECTED
MCH RBC QN AUTO: 32.1 PG (ref 26.6–33)
MCHC RBC AUTO-ENTMCNC: 34 G/DL (ref 31.5–35.7)
MCV RBC AUTO: 94.3 FL (ref 79–97)
MONOCYTES # BLD AUTO: 0.6 10*3/MM3 (ref 0.1–0.9)
MONOCYTES NFR BLD AUTO: 5.7 % (ref 5–12)
NEUTROPHILS NFR BLD AUTO: 5.5 10*3/MM3 (ref 1.7–7)
NEUTROPHILS NFR BLD AUTO: 54.6 % (ref 42.7–76)
NRBC BLD AUTO-RTO: 0.1 /100 WBC (ref 0–0.2)
NT-PROBNP SERPL-MCNC: 58.1 PG/ML (ref 0–900)
PLATELET # BLD AUTO: 336 10*3/MM3 (ref 140–450)
PMV BLD AUTO: 9.3 FL (ref 6–12)
POTASSIUM SERPL-SCNC: 3.8 MMOL/L (ref 3.5–5.2)
PROT SERPL-MCNC: 6.6 G/DL (ref 6–8.5)
RBC # BLD AUTO: 4.2 10*6/MM3 (ref 3.77–5.28)
RHINOVIRUS RNA SPEC NAA+PROBE: NOT DETECTED
RSV RNA NPH QL NAA+NON-PROBE: NOT DETECTED
SARS-COV-2 RNA NPH QL NAA+NON-PROBE: NOT DETECTED
SODIUM SERPL-SCNC: 138 MMOL/L (ref 136–145)
TROPONIN T SERPL-MCNC: <0.01 NG/ML (ref 0–0.03)
TROPONIN T SERPL-MCNC: <0.01 NG/ML (ref 0–0.03)
WBC # BLD AUTO: 10.1 10*3/MM3 (ref 3.4–10.8)
WHOLE BLOOD HOLD SPECIMEN: NORMAL

## 2020-10-27 PROCEDURE — G0378 HOSPITAL OBSERVATION PER HR: HCPCS

## 2020-10-27 PROCEDURE — 80053 COMPREHEN METABOLIC PANEL: CPT | Performed by: PHYSICIAN ASSISTANT

## 2020-10-27 PROCEDURE — 99284 EMERGENCY DEPT VISIT MOD MDM: CPT

## 2020-10-27 PROCEDURE — 63710000001 INSULIN LISPRO (HUMAN) PER 5 UNITS: Performed by: NURSE PRACTITIONER

## 2020-10-27 PROCEDURE — 93005 ELECTROCARDIOGRAM TRACING: CPT | Performed by: PHYSICIAN ASSISTANT

## 2020-10-27 PROCEDURE — 83880 ASSAY OF NATRIURETIC PEPTIDE: CPT | Performed by: PHYSICIAN ASSISTANT

## 2020-10-27 PROCEDURE — 63710000001 INSULIN GLARGINE PER 5 UNITS: Performed by: NURSE PRACTITIONER

## 2020-10-27 PROCEDURE — 71045 X-RAY EXAM CHEST 1 VIEW: CPT

## 2020-10-27 PROCEDURE — 85379 FIBRIN DEGRADATION QUANT: CPT | Performed by: NURSE PRACTITIONER

## 2020-10-27 PROCEDURE — 93005 ELECTROCARDIOGRAM TRACING: CPT

## 2020-10-27 PROCEDURE — 96374 THER/PROPH/DIAG INJ IV PUSH: CPT

## 2020-10-27 PROCEDURE — 93005 ELECTROCARDIOGRAM TRACING: CPT | Performed by: INTERNAL MEDICINE

## 2020-10-27 PROCEDURE — 84484 ASSAY OF TROPONIN QUANT: CPT | Performed by: NURSE PRACTITIONER

## 2020-10-27 PROCEDURE — 99220 PR INITIAL OBSERVATION CARE/DAY 70 MINUTES: CPT | Performed by: INTERNAL MEDICINE

## 2020-10-27 PROCEDURE — 85025 COMPLETE CBC W/AUTO DIFF WBC: CPT | Performed by: PHYSICIAN ASSISTANT

## 2020-10-27 PROCEDURE — 84484 ASSAY OF TROPONIN QUANT: CPT | Performed by: PHYSICIAN ASSISTANT

## 2020-10-27 PROCEDURE — 94640 AIRWAY INHALATION TREATMENT: CPT

## 2020-10-27 PROCEDURE — 0202U NFCT DS 22 TRGT SARS-COV-2: CPT | Performed by: PHYSICIAN ASSISTANT

## 2020-10-27 PROCEDURE — 25010000002 METHYLPREDNISOLONE PER 125 MG: Performed by: PHYSICIAN ASSISTANT

## 2020-10-27 PROCEDURE — 83036 HEMOGLOBIN GLYCOSYLATED A1C: CPT | Performed by: NURSE PRACTITIONER

## 2020-10-27 PROCEDURE — 82962 GLUCOSE BLOOD TEST: CPT

## 2020-10-27 RX ORDER — SODIUM CHLORIDE 0.9 % (FLUSH) 0.9 %
10 SYRINGE (ML) INJECTION AS NEEDED
Status: DISCONTINUED | OUTPATIENT
Start: 2020-10-27 | End: 2020-10-28 | Stop reason: HOSPADM

## 2020-10-27 RX ORDER — IPRATROPIUM BROMIDE AND ALBUTEROL SULFATE 2.5; .5 MG/3ML; MG/3ML
3 SOLUTION RESPIRATORY (INHALATION) EVERY 4 HOURS PRN
Status: DISCONTINUED | OUTPATIENT
Start: 2020-10-27 | End: 2020-10-28 | Stop reason: HOSPADM

## 2020-10-27 RX ORDER — ALBUTEROL SULFATE 90 UG/1
2 AEROSOL, METERED RESPIRATORY (INHALATION) ONCE
Status: COMPLETED | OUTPATIENT
Start: 2020-10-27 | End: 2020-10-27

## 2020-10-27 RX ORDER — NICOTINE POLACRILEX 4 MG
15 LOZENGE BUCCAL
Status: DISCONTINUED | OUTPATIENT
Start: 2020-10-27 | End: 2020-10-28 | Stop reason: SDUPTHER

## 2020-10-27 RX ORDER — ESCITALOPRAM OXALATE 10 MG/1
10 TABLET ORAL DAILY
Status: DISCONTINUED | OUTPATIENT
Start: 2020-10-28 | End: 2020-10-28 | Stop reason: HOSPADM

## 2020-10-27 RX ORDER — INSULIN LISPRO 100 [IU]/ML
0-7 INJECTION, SOLUTION INTRAVENOUS; SUBCUTANEOUS AS NEEDED
Status: DISCONTINUED | OUTPATIENT
Start: 2020-10-27 | End: 2020-10-28 | Stop reason: DRUGHIGH

## 2020-10-27 RX ORDER — SODIUM CHLORIDE 0.9 % (FLUSH) 0.9 %
10 SYRINGE (ML) INJECTION EVERY 12 HOURS SCHEDULED
Status: DISCONTINUED | OUTPATIENT
Start: 2020-10-27 | End: 2020-10-28 | Stop reason: HOSPADM

## 2020-10-27 RX ORDER — METHYLPREDNISOLONE SODIUM SUCCINATE 125 MG/2ML
125 INJECTION, POWDER, LYOPHILIZED, FOR SOLUTION INTRAMUSCULAR; INTRAVENOUS ONCE
Status: COMPLETED | OUTPATIENT
Start: 2020-10-27 | End: 2020-10-27

## 2020-10-27 RX ORDER — CHOLECALCIFEROL (VITAMIN D3) 125 MCG
5 CAPSULE ORAL NIGHTLY PRN
Status: DISCONTINUED | OUTPATIENT
Start: 2020-10-27 | End: 2020-10-28 | Stop reason: HOSPADM

## 2020-10-27 RX ORDER — THIAMINE HCL 100 MG
100 TABLET ORAL DAILY
Status: DISCONTINUED | OUTPATIENT
Start: 2020-10-28 | End: 2020-10-28 | Stop reason: HOSPADM

## 2020-10-27 RX ORDER — ACETAMINOPHEN 160 MG/5ML
650 SOLUTION ORAL EVERY 4 HOURS PRN
Status: DISCONTINUED | OUTPATIENT
Start: 2020-10-27 | End: 2020-10-28 | Stop reason: HOSPADM

## 2020-10-27 RX ORDER — BUDESONIDE 0.5 MG/2ML
0.5 INHALANT ORAL
Status: DISCONTINUED | OUTPATIENT
Start: 2020-10-27 | End: 2020-10-28 | Stop reason: HOSPADM

## 2020-10-27 RX ORDER — GABAPENTIN 300 MG/1
300 CAPSULE ORAL 4 TIMES DAILY
Status: DISCONTINUED | OUTPATIENT
Start: 2020-10-27 | End: 2020-10-28 | Stop reason: HOSPADM

## 2020-10-27 RX ORDER — ACETAMINOPHEN 325 MG/1
650 TABLET ORAL EVERY 4 HOURS PRN
Status: DISCONTINUED | OUTPATIENT
Start: 2020-10-27 | End: 2020-10-28 | Stop reason: HOSPADM

## 2020-10-27 RX ORDER — BISACODYL 10 MG
10 SUPPOSITORY, RECTAL RECTAL DAILY PRN
Status: DISCONTINUED | OUTPATIENT
Start: 2020-10-27 | End: 2020-10-28 | Stop reason: HOSPADM

## 2020-10-27 RX ORDER — BACLOFEN 10 MG/1
5 TABLET ORAL EVERY 6 HOURS PRN
Status: DISCONTINUED | OUTPATIENT
Start: 2020-10-27 | End: 2020-10-28 | Stop reason: HOSPADM

## 2020-10-27 RX ORDER — ONDANSETRON 2 MG/ML
4 INJECTION INTRAMUSCULAR; INTRAVENOUS EVERY 6 HOURS PRN
Status: DISCONTINUED | OUTPATIENT
Start: 2020-10-27 | End: 2020-10-28 | Stop reason: HOSPADM

## 2020-10-27 RX ORDER — INSULIN GLARGINE 100 [IU]/ML
20 INJECTION, SOLUTION SUBCUTANEOUS NIGHTLY
Status: ON HOLD | COMMUNITY
End: 2020-10-28 | Stop reason: SDUPTHER

## 2020-10-27 RX ORDER — TRAZODONE HYDROCHLORIDE 50 MG/1
50 TABLET ORAL NIGHTLY
COMMUNITY
End: 2020-10-28 | Stop reason: HOSPADM

## 2020-10-27 RX ORDER — BACLOFEN 10 MG/1
5 TABLET ORAL EVERY 6 HOURS PRN
Status: ON HOLD | COMMUNITY
End: 2020-10-28 | Stop reason: SDUPTHER

## 2020-10-27 RX ORDER — ONDANSETRON 4 MG/1
4 TABLET, FILM COATED ORAL EVERY 6 HOURS PRN
Status: DISCONTINUED | OUTPATIENT
Start: 2020-10-27 | End: 2020-10-28 | Stop reason: HOSPADM

## 2020-10-27 RX ORDER — ACETAMINOPHEN 650 MG/1
650 SUPPOSITORY RECTAL EVERY 4 HOURS PRN
Status: DISCONTINUED | OUTPATIENT
Start: 2020-10-27 | End: 2020-10-28 | Stop reason: HOSPADM

## 2020-10-27 RX ORDER — BISACODYL 5 MG/1
5 TABLET, DELAYED RELEASE ORAL DAILY PRN
Status: DISCONTINUED | OUTPATIENT
Start: 2020-10-27 | End: 2020-10-28 | Stop reason: HOSPADM

## 2020-10-27 RX ORDER — LEVOTHYROXINE SODIUM 0.03 MG/1
25 TABLET ORAL
Status: DISCONTINUED | OUTPATIENT
Start: 2020-10-28 | End: 2020-10-28 | Stop reason: HOSPADM

## 2020-10-27 RX ORDER — ERGOCALCIFEROL 1.25 MG/1
50000 CAPSULE ORAL WEEKLY
COMMUNITY
End: 2021-05-27

## 2020-10-27 RX ORDER — ALUMINA, MAGNESIA, AND SIMETHICONE 2400; 2400; 240 MG/30ML; MG/30ML; MG/30ML
15 SUSPENSION ORAL EVERY 6 HOURS PRN
Status: DISCONTINUED | OUTPATIENT
Start: 2020-10-27 | End: 2020-10-28 | Stop reason: HOSPADM

## 2020-10-27 RX ORDER — INSULIN LISPRO 100 [IU]/ML
0-7 INJECTION, SOLUTION INTRAVENOUS; SUBCUTANEOUS
Status: DISCONTINUED | OUTPATIENT
Start: 2020-10-27 | End: 2020-10-28 | Stop reason: DRUGHIGH

## 2020-10-27 RX ORDER — MELOXICAM 15 MG/1
7.5 TABLET ORAL DAILY
Status: DISCONTINUED | OUTPATIENT
Start: 2020-10-28 | End: 2020-10-28 | Stop reason: HOSPADM

## 2020-10-27 RX ORDER — DEXTROSE MONOHYDRATE 25 G/50ML
25 INJECTION, SOLUTION INTRAVENOUS
Status: DISCONTINUED | OUTPATIENT
Start: 2020-10-27 | End: 2020-10-28 | Stop reason: SDUPTHER

## 2020-10-27 RX ORDER — INSULIN GLARGINE 100 [IU]/ML
20 INJECTION, SOLUTION SUBCUTANEOUS NIGHTLY
Status: DISCONTINUED | OUTPATIENT
Start: 2020-10-27 | End: 2020-10-28

## 2020-10-27 RX ORDER — IPRATROPIUM BROMIDE AND ALBUTEROL SULFATE 2.5; .5 MG/3ML; MG/3ML
3 SOLUTION RESPIRATORY (INHALATION) ONCE
Status: DISCONTINUED | OUTPATIENT
Start: 2020-10-27 | End: 2020-10-27

## 2020-10-27 RX ORDER — IPRATROPIUM BROMIDE AND ALBUTEROL SULFATE 2.5; .5 MG/3ML; MG/3ML
3 SOLUTION RESPIRATORY (INHALATION)
Status: DISCONTINUED | OUTPATIENT
Start: 2020-10-27 | End: 2020-10-28 | Stop reason: HOSPADM

## 2020-10-27 RX ORDER — FOLIC ACID 1 MG/1
1 TABLET ORAL DAILY
Status: DISCONTINUED | OUTPATIENT
Start: 2020-10-28 | End: 2020-10-28 | Stop reason: HOSPADM

## 2020-10-27 RX ORDER — TRAZODONE HYDROCHLORIDE 50 MG/1
50 TABLET ORAL NIGHTLY
Status: DISCONTINUED | OUTPATIENT
Start: 2020-10-27 | End: 2020-10-28 | Stop reason: HOSPADM

## 2020-10-27 RX ORDER — PREDNISONE 20 MG/1
40 TABLET ORAL
Status: DISCONTINUED | OUTPATIENT
Start: 2020-10-28 | End: 2020-10-28 | Stop reason: HOSPADM

## 2020-10-27 RX ORDER — ALBUTEROL SULFATE 2.5 MG/3ML
2.5 SOLUTION RESPIRATORY (INHALATION) EVERY 4 HOURS PRN
Status: DISCONTINUED | OUTPATIENT
Start: 2020-10-27 | End: 2020-10-28 | Stop reason: HOSPADM

## 2020-10-27 RX ADMIN — INSULIN LISPRO 5 UNITS: 100 INJECTION, SOLUTION INTRAVENOUS; SUBCUTANEOUS at 21:39

## 2020-10-27 RX ADMIN — GABAPENTIN 300 MG: 300 CAPSULE ORAL at 21:39

## 2020-10-27 RX ADMIN — ALBUTEROL SULFATE 2 PUFF: 108 AEROSOL, METERED RESPIRATORY (INHALATION) at 17:48

## 2020-10-27 RX ADMIN — TRAZODONE HYDROCHLORIDE 50 MG: 50 TABLET ORAL at 21:39

## 2020-10-27 RX ADMIN — INSULIN GLARGINE 20 UNITS: 100 INJECTION, SOLUTION SUBCUTANEOUS at 21:39

## 2020-10-27 RX ADMIN — Medication 10 ML: at 22:20

## 2020-10-27 RX ADMIN — METHYLPREDNISOLONE SODIUM SUCCINATE 125 MG: 125 INJECTION, POWDER, FOR SOLUTION INTRAMUSCULAR; INTRAVENOUS at 17:59

## 2020-10-28 ENCOUNTER — APPOINTMENT (OUTPATIENT)
Dept: NUCLEAR MEDICINE | Facility: HOSPITAL | Age: 55
End: 2020-10-28

## 2020-10-28 VITALS
SYSTOLIC BLOOD PRESSURE: 131 MMHG | DIASTOLIC BLOOD PRESSURE: 68 MMHG | BODY MASS INDEX: 27.67 KG/M2 | HEART RATE: 100 BPM | OXYGEN SATURATION: 98 % | TEMPERATURE: 98.3 F | RESPIRATION RATE: 20 BRPM | WEIGHT: 150.35 LBS | HEIGHT: 62 IN

## 2020-10-28 LAB
ANION GAP SERPL CALCULATED.3IONS-SCNC: 13 MMOL/L (ref 5–15)
BASOPHILS # BLD AUTO: 0 10*3/MM3 (ref 0–0.2)
BASOPHILS NFR BLD AUTO: 0.2 % (ref 0–1.5)
BH CV NUCLEAR PRIOR STUDY: 3
BH CV STRESS BP STAGE 1: NORMAL
BH CV STRESS BP STAGE 2: NORMAL
BH CV STRESS BP STAGE 3: NORMAL
BH CV STRESS COMMENTS STAGE 1: NORMAL
BH CV STRESS COMMENTS STAGE 2: NORMAL
BH CV STRESS DOSE REGADENOSON STAGE 1: 0.4
BH CV STRESS DURATION MIN STAGE 1: 0
BH CV STRESS DURATION MIN STAGE 2: 4
BH CV STRESS DURATION SEC STAGE 1: 10
BH CV STRESS DURATION SEC STAGE 2: 0
BH CV STRESS HR STAGE 1: 123
BH CV STRESS HR STAGE 2: 124
BH CV STRESS HR STAGE 3: 119
BH CV STRESS PROTOCOL 1: NORMAL
BH CV STRESS RECOVERY BP: NORMAL MMHG
BH CV STRESS RECOVERY HR: 103 BPM
BH CV STRESS STAGE 1: 1
BH CV STRESS STAGE 2: 2
BH CV STRESS STAGE 3: 3
BUN SERPL-MCNC: 25 MG/DL (ref 6–20)
BUN/CREAT SERPL: 32.9 (ref 7–25)
CALCIUM SPEC-SCNC: 9.9 MG/DL (ref 8.6–10.5)
CHLORIDE SERPL-SCNC: 101 MMOL/L (ref 98–107)
CO2 SERPL-SCNC: 22 MMOL/L (ref 22–29)
CREAT SERPL-MCNC: 0.76 MG/DL (ref 0.57–1)
DEPRECATED RDW RBC AUTO: 45.9 FL (ref 37–54)
EOSINOPHIL # BLD AUTO: 0 10*3/MM3 (ref 0–0.4)
EOSINOPHIL NFR BLD AUTO: 0 % (ref 0.3–6.2)
ERYTHROCYTE [DISTWIDTH] IN BLOOD BY AUTOMATED COUNT: 13.5 % (ref 12.3–15.4)
GFR SERPL CREATININE-BSD FRML MDRD: 79 ML/MIN/1.73
GLUCOSE BLDC GLUCOMTR-MCNC: 164 MG/DL (ref 70–105)
GLUCOSE BLDC GLUCOMTR-MCNC: 341 MG/DL (ref 70–105)
GLUCOSE BLDC GLUCOMTR-MCNC: 386 MG/DL (ref 70–105)
GLUCOSE BLDC GLUCOMTR-MCNC: 399 MG/DL (ref 70–105)
GLUCOSE BLDC GLUCOMTR-MCNC: 410 MG/DL (ref 70–105)
GLUCOSE SERPL-MCNC: 516 MG/DL (ref 65–99)
HBA1C MFR BLD: 8.9 % (ref 3.5–5.6)
HCT VFR BLD AUTO: 39.5 % (ref 34–46.6)
HGB BLD-MCNC: 13.2 G/DL (ref 12–15.9)
LV EF NUC BP: 69 %
LYMPHOCYTES # BLD AUTO: 1.4 10*3/MM3 (ref 0.7–3.1)
LYMPHOCYTES NFR BLD AUTO: 13.2 % (ref 19.6–45.3)
MAXIMAL PREDICTED HEART RATE: 164 BPM
MCH RBC QN AUTO: 32.3 PG (ref 26.6–33)
MCHC RBC AUTO-ENTMCNC: 33.3 G/DL (ref 31.5–35.7)
MCV RBC AUTO: 97.2 FL (ref 79–97)
MONOCYTES # BLD AUTO: 0.1 10*3/MM3 (ref 0.1–0.9)
MONOCYTES NFR BLD AUTO: 1.2 % (ref 5–12)
NEUTROPHILS NFR BLD AUTO: 85.4 % (ref 42.7–76)
NEUTROPHILS NFR BLD AUTO: 9.4 10*3/MM3 (ref 1.7–7)
NRBC BLD AUTO-RTO: 0.1 /100 WBC (ref 0–0.2)
PERCENT MAX PREDICTED HR: 75.61 %
PLATELET # BLD AUTO: 319 10*3/MM3 (ref 140–450)
PMV BLD AUTO: 9.9 FL (ref 6–12)
POTASSIUM SERPL-SCNC: 4.5 MMOL/L (ref 3.5–5.2)
QT INTERVAL: 361 MS
RBC # BLD AUTO: 4.07 10*6/MM3 (ref 3.77–5.28)
SODIUM SERPL-SCNC: 136 MMOL/L (ref 136–145)
STRESS BASELINE BP: NORMAL MMHG
STRESS BASELINE HR: 83 BPM
STRESS PERCENT HR: 89 %
STRESS POST PEAK BP: NORMAL MMHG
STRESS POST PEAK HR: 124 BPM
STRESS TARGET HR: 139 BPM
TROPONIN T SERPL-MCNC: <0.01 NG/ML (ref 0–0.03)
WBC # BLD AUTO: 11 10*3/MM3 (ref 3.4–10.8)

## 2020-10-28 PROCEDURE — 94799 UNLISTED PULMONARY SVC/PX: CPT

## 2020-10-28 PROCEDURE — 82962 GLUCOSE BLOOD TEST: CPT

## 2020-10-28 PROCEDURE — 80048 BASIC METABOLIC PNL TOTAL CA: CPT | Performed by: NURSE PRACTITIONER

## 2020-10-28 PROCEDURE — 78452 HT MUSCLE IMAGE SPECT MULT: CPT

## 2020-10-28 PROCEDURE — 99217 PR OBSERVATION CARE DISCHARGE MANAGEMENT: CPT | Performed by: INTERNAL MEDICINE

## 2020-10-28 PROCEDURE — 63710000001 PREDNISONE PER 1 MG: Performed by: NURSE PRACTITIONER

## 2020-10-28 PROCEDURE — 99214 OFFICE O/P EST MOD 30 MIN: CPT | Performed by: INTERNAL MEDICINE

## 2020-10-28 PROCEDURE — 94640 AIRWAY INHALATION TREATMENT: CPT

## 2020-10-28 PROCEDURE — 84484 ASSAY OF TROPONIN QUANT: CPT | Performed by: NURSE PRACTITIONER

## 2020-10-28 PROCEDURE — 93018 CV STRESS TEST I&R ONLY: CPT | Performed by: NURSE PRACTITIONER

## 2020-10-28 PROCEDURE — G0378 HOSPITAL OBSERVATION PER HR: HCPCS

## 2020-10-28 PROCEDURE — A9500 TC99M SESTAMIBI: HCPCS | Performed by: INTERNAL MEDICINE

## 2020-10-28 PROCEDURE — 63710000001 INSULIN LISPRO (HUMAN) PER 5 UNITS: Performed by: NURSE PRACTITIONER

## 2020-10-28 PROCEDURE — 0 TECHNETIUM SESTAMIBI: Performed by: INTERNAL MEDICINE

## 2020-10-28 PROCEDURE — 78452 HT MUSCLE IMAGE SPECT MULT: CPT | Performed by: INTERNAL MEDICINE

## 2020-10-28 PROCEDURE — 25010000002 REGADENOSON 0.4 MG/5ML SOLUTION: Performed by: INTERNAL MEDICINE

## 2020-10-28 PROCEDURE — 93017 CV STRESS TEST TRACING ONLY: CPT

## 2020-10-28 PROCEDURE — 63710000001 INSULIN LISPRO (HUMAN) PER 5 UNITS: Performed by: INTERNAL MEDICINE

## 2020-10-28 PROCEDURE — 85025 COMPLETE CBC W/AUTO DIFF WBC: CPT | Performed by: NURSE PRACTITIONER

## 2020-10-28 RX ORDER — INSULIN LISPRO 100 [IU]/ML
0-9 INJECTION, SOLUTION INTRAVENOUS; SUBCUTANEOUS AS NEEDED
Status: DISCONTINUED | OUTPATIENT
Start: 2020-10-28 | End: 2020-10-28 | Stop reason: HOSPADM

## 2020-10-28 RX ORDER — NICOTINE POLACRILEX 4 MG
15 LOZENGE BUCCAL
Status: DISCONTINUED | OUTPATIENT
Start: 2020-10-28 | End: 2020-10-28 | Stop reason: HOSPADM

## 2020-10-28 RX ORDER — INSULIN GLARGINE 100 [IU]/ML
25 INJECTION, SOLUTION SUBCUTANEOUS NIGHTLY
Status: DISCONTINUED | OUTPATIENT
Start: 2020-10-28 | End: 2020-10-28 | Stop reason: HOSPADM

## 2020-10-28 RX ORDER — INSULIN GLARGINE 100 [IU]/ML
25 INJECTION, SOLUTION SUBCUTANEOUS NIGHTLY
Start: 2020-10-28 | End: 2020-10-29

## 2020-10-28 RX ORDER — FOLIC ACID 1 MG/1
1 TABLET ORAL DAILY
Qty: 360 TABLET | Refills: 0 | Status: SHIPPED | OUTPATIENT
Start: 2020-10-29 | End: 2021-10-29

## 2020-10-28 RX ORDER — DEXTROSE MONOHYDRATE 25 G/50ML
25 INJECTION, SOLUTION INTRAVENOUS
Status: DISCONTINUED | OUTPATIENT
Start: 2020-10-28 | End: 2020-10-28 | Stop reason: HOSPADM

## 2020-10-28 RX ORDER — INSULIN LISPRO 100 [IU]/ML
8 INJECTION, SOLUTION INTRAVENOUS; SUBCUTANEOUS
Status: DISCONTINUED | OUTPATIENT
Start: 2020-10-29 | End: 2020-10-28 | Stop reason: HOSPADM

## 2020-10-28 RX ORDER — INSULIN LISPRO 100 [IU]/ML
0-9 INJECTION, SOLUTION INTRAVENOUS; SUBCUTANEOUS
Status: DISCONTINUED | OUTPATIENT
Start: 2020-10-28 | End: 2020-10-28 | Stop reason: HOSPADM

## 2020-10-28 RX ORDER — BACLOFEN 10 MG/1
5 TABLET ORAL 3 TIMES DAILY PRN
Start: 2020-10-28 | End: 2021-05-27

## 2020-10-28 RX ORDER — GABAPENTIN 300 MG/1
300 CAPSULE ORAL 2 TIMES DAILY
Start: 2020-10-28

## 2020-10-28 RX ADMIN — INSULIN LISPRO 7 UNITS: 100 INJECTION, SOLUTION INTRAVENOUS; SUBCUTANEOUS at 06:00

## 2020-10-28 RX ADMIN — INSULIN LISPRO 6 UNITS: 100 INJECTION, SOLUTION INTRAVENOUS; SUBCUTANEOUS at 12:04

## 2020-10-28 RX ADMIN — BACLOFEN 5 MG: 10 TABLET ORAL at 12:07

## 2020-10-28 RX ADMIN — GABAPENTIN 300 MG: 300 CAPSULE ORAL at 18:03

## 2020-10-28 RX ADMIN — ESCITALOPRAM OXALATE 10 MG: 10 TABLET ORAL at 08:10

## 2020-10-28 RX ADMIN — FOLIC ACID 1 MG: 1 TABLET ORAL at 08:11

## 2020-10-28 RX ADMIN — INSULIN LISPRO 4 UNITS: 100 INJECTION, SOLUTION INTRAVENOUS; SUBCUTANEOUS at 12:03

## 2020-10-28 RX ADMIN — BUDESONIDE 0.5 MG: 0.5 INHALANT RESPIRATORY (INHALATION) at 07:06

## 2020-10-28 RX ADMIN — INSULIN LISPRO 7 UNITS: 100 INJECTION, SOLUTION INTRAVENOUS; SUBCUTANEOUS at 16:38

## 2020-10-28 RX ADMIN — BUDESONIDE 0.5 MG: 0.5 INHALANT RESPIRATORY (INHALATION) at 18:25

## 2020-10-28 RX ADMIN — MELOXICAM 7.5 MG: 15 TABLET ORAL at 07:05

## 2020-10-28 RX ADMIN — Medication 10 ML: at 08:11

## 2020-10-28 RX ADMIN — TECHNETIUM TC 99M SESTAMIBI 1 DOSE: 1 INJECTION INTRAVENOUS at 06:45

## 2020-10-28 RX ADMIN — INSULIN LISPRO 7 UNITS: 100 INJECTION, SOLUTION INTRAVENOUS; SUBCUTANEOUS at 07:02

## 2020-10-28 RX ADMIN — GABAPENTIN 300 MG: 300 CAPSULE ORAL at 08:11

## 2020-10-28 RX ADMIN — INSULIN LISPRO 6 UNITS: 100 INJECTION, SOLUTION INTRAVENOUS; SUBCUTANEOUS at 08:11

## 2020-10-28 RX ADMIN — BACLOFEN 5 MG: 10 TABLET ORAL at 18:03

## 2020-10-28 RX ADMIN — INSULIN LISPRO 6 UNITS: 100 INJECTION, SOLUTION INTRAVENOUS; SUBCUTANEOUS at 16:40

## 2020-10-28 RX ADMIN — TECHNETIUM TC 99M SESTAMIBI 1 DOSE: 1 INJECTION INTRAVENOUS at 10:05

## 2020-10-28 RX ADMIN — PREDNISONE 40 MG: 20 TABLET ORAL at 08:11

## 2020-10-28 RX ADMIN — IPRATROPIUM BROMIDE AND ALBUTEROL SULFATE 3 ML: 2.5; .5 SOLUTION RESPIRATORY (INHALATION) at 18:25

## 2020-10-28 RX ADMIN — Medication 5000 UNITS: at 08:11

## 2020-10-28 RX ADMIN — REGADENOSON 0.4 MG: 0.08 INJECTION, SOLUTION INTRAVENOUS at 10:05

## 2020-10-28 RX ADMIN — GABAPENTIN 300 MG: 300 CAPSULE ORAL at 12:03

## 2020-10-28 RX ADMIN — IPRATROPIUM BROMIDE AND ALBUTEROL SULFATE 3 ML: 2.5; .5 SOLUTION RESPIRATORY (INHALATION) at 07:06

## 2020-10-28 RX ADMIN — Medication 100 MG: at 08:10

## 2020-10-28 RX ADMIN — LEVOTHYROXINE SODIUM 25 MCG: 25 TABLET ORAL at 06:00

## 2020-10-29 ENCOUNTER — READMISSION MANAGEMENT (OUTPATIENT)
Dept: CALL CENTER | Facility: HOSPITAL | Age: 55
End: 2020-10-29

## 2020-10-29 ENCOUNTER — TELEPHONE (OUTPATIENT)
Dept: ENDOCRINOLOGY | Facility: CLINIC | Age: 55
End: 2020-10-29

## 2020-10-29 RX ORDER — INSULIN GLARGINE 100 [IU]/ML
INJECTION, SOLUTION SUBCUTANEOUS
Qty: 15 ML | Refills: 0 | Status: SHIPPED | OUTPATIENT
Start: 2020-10-29 | End: 2020-12-14

## 2020-10-29 NOTE — OUTREACH NOTE
Prep Survey      Responses   [facility] discharged from?  [facility]   Is LACE score < 7 ?  No   Eligibility  Readm Mgmt   Discharge diagnosis  Chest pain, SOB   Does the patient have one of the following disease processes/diagnoses(primary or secondary)?  Other   Does the patient have Home health ordered?  Yes   What is the Home health agency?   [facility]    Is there a DME ordered?  No   Comments regarding appointments  Needs f/u scheduled   Medication alerts for this patient  changes to insulin   Prep survey completed?  Yes          Courtney Del Rosario RN

## 2020-10-29 NOTE — TELEPHONE ENCOUNTER
She called for high blood sugars, and I returned her call this morning.  Please call her back and see how her sugars are doing.

## 2020-10-29 NOTE — TELEPHONE ENCOUNTER
Pt called and LM stating that she received a call from you at 6a this morning. She is just returning your call. I do not see in the chart any reason for calling. Please advise.

## 2020-11-02 ENCOUNTER — READMISSION MANAGEMENT (OUTPATIENT)
Dept: CALL CENTER | Facility: HOSPITAL | Age: 55
End: 2020-11-02

## 2020-11-02 NOTE — OUTREACH NOTE
Medical Week 1 Survey      Responses   [facility] patient discharged from?  [facility]   Does the patient have one of the following disease processes/diagnoses(primary or secondary)?  Other   Week 1 attempt successful?  Yes   Call start time  1112   Revoke  Decline to participate   Call end time  1113          Edna Anders, RN

## 2020-11-09 ENCOUNTER — TELEMEDICINE (OUTPATIENT)
Dept: ENDOCRINOLOGY | Facility: CLINIC | Age: 55
End: 2020-11-09

## 2020-11-09 VITALS
HEART RATE: 117 BPM | WEIGHT: 138.5 LBS | DIASTOLIC BLOOD PRESSURE: 82 MMHG | SYSTOLIC BLOOD PRESSURE: 130 MMHG | HEIGHT: 62 IN | BODY MASS INDEX: 25.49 KG/M2

## 2020-11-09 DIAGNOSIS — E10.65 TYPE 1 DIABETES MELLITUS WITH HYPERGLYCEMIA (HCC): Primary | ICD-10-CM

## 2020-11-09 DIAGNOSIS — E03.9 ACQUIRED HYPOTHYROIDISM: ICD-10-CM

## 2020-11-09 PROCEDURE — 99214 OFFICE O/P EST MOD 30 MIN: CPT | Performed by: INTERNAL MEDICINE

## 2020-11-09 RX ORDER — SERTRALINE HYDROCHLORIDE 25 MG/1
TABLET, FILM COATED ORAL
COMMUNITY
Start: 2020-11-04 | End: 2021-03-16

## 2020-11-09 RX ORDER — SYRINGE AND NEEDLE,INSULIN,1ML 30 GX5/16"
SYRINGE, EMPTY DISPOSABLE MISCELLANEOUS
COMMUNITY
Start: 2020-08-19 | End: 2021-05-27

## 2020-11-09 RX ORDER — PEN NEEDLE, DIABETIC 31 GX5/16"
NEEDLE, DISPOSABLE MISCELLANEOUS
COMMUNITY
Start: 2020-11-01 | End: 2021-05-27

## 2020-11-09 RX ORDER — LISINOPRIL 5 MG/1
TABLET ORAL
COMMUNITY
Start: 2020-11-04 | End: 2021-05-27

## 2020-11-09 RX ORDER — BLOOD SUGAR DIAGNOSTIC
STRIP MISCELLANEOUS
COMMUNITY
Start: 2020-11-03 | End: 2021-05-27

## 2020-11-09 RX ORDER — CHOLECALCIFEROL (VITAMIN D3) 1250 MCG
50000 CAPSULE ORAL
COMMUNITY
Start: 2020-10-05

## 2020-11-09 RX ORDER — TRAMADOL HYDROCHLORIDE 50 MG/1
TABLET ORAL
COMMUNITY
Start: 2020-08-07 | End: 2021-03-16

## 2020-11-09 RX ORDER — TRAZODONE HYDROCHLORIDE 50 MG/1
50 TABLET ORAL NIGHTLY
COMMUNITY

## 2020-11-09 NOTE — PATIENT INSTRUCTIONS
Increase Basaglar to 40 units subcu nightly  Increase lispro to 14 units with each meal along with a sliding scale of 1 unit for each 30 mg blood sugar over 140 at meals  Arrange for diabetes education for insulin pump evaluation  Always keep glucose source in case of low blood sugar  Annual eye exam and flu vaccine  Labs before follow-up

## 2020-11-09 NOTE — PROGRESS NOTES
Endocrine Consult Outpatient    Patient Care Team:  Ryne Hood APRN as PCP - General (Nurse Practitioner)   You have chosen to receive care through a telehealth visit.  Do you consent to use a video/audio connection for your medical care today? Yes.     Chief Complaint: Follow-up type 1 diabetes: Visit conducted through audio and video conference utilizing [employer].     HPI: 54-year-old female with history of type 1 diabetes, hypothyroidism was followed through telehealth.  For type 1 diabetes: She is currently on Lantus 30 units at bedtime with Humalog 8 units with each meal and her blood sugars are running very high into 300-500.  She denies any low blood sugars.  She is trying to follow the diet.      Hypothyroidism: On levothyroxine supplementation.    Past Medical History:   Diagnosis Date   • Diabetes mellitus type I (CMS/[facility])    • Lupus (CMS/[facility])        Social History     Socioeconomic History   • Marital status: Single     Spouse name: Not on file   • Number of children: Not on file   • Years of education: Not on file   • Highest education level: Not on file   Tobacco Use   • Smoking status: Former Smoker     Packs/day: 0.50   • Smokeless tobacco: Never Used   Substance and Sexual Activity   • Alcohol use: Not Currently     Alcohol/week: 4.0 standard drinks     Types: 4 Shots of liquor per week     Comment: a day   • Drug use: Never   • Sexual activity: Defer       Family History   Problem Relation Age of Onset   • Thyroid disease Mother    • Heart disease Mother    • Cancer Father    • Heart disease Father        Allergies   Allergen Reactions   • Penicillins Anaphylaxis       ROS:   Constitutional:  Denies fatigue, tiredness.    Eyes:  Denies change in visual acuity   HENT:  Denies nasal congestion or sore throat   Respiratory: denies cough, shortness of breath.   Cardiovascular:  denies chest pain, edema   GI:  Denies abdominal pain, nausea, vomiting.    :  Denies dysuria   Musculoskeletal:   Denies back pain or joint pain   Integument:  Denies dry skin, rash   Neurologic:  Denies headache, focal weakness or sensory changes   Endocrine:  Denies polyuria or polydipsia   Psychiatric:  Denies depression or anxiety      Vitals:    11/09/20 1321   BP: 130/82   Pulse: 117        Physical Exam:  GEN: NAD, conversant  EYES: EOMI, PERRL, no conjunctival erythema  NECK: no thyromegaly, full ROM   CV: RRR, no murmurs/rubs/gallops, no peripheral edema  LUNG: CTAB, no wheezes/rales/ronchi  SKIN: no rashes, no acanthosis  MSK: no deformities, full ROM of all extremities  NEURO: no tremors, DTR normal  PSYCH: AOX3, appropriate mood, affect normal      Results Review:     I reviewed the patient's new clinical results.    Lab Results   Component Value Date    GLUCOSE 516 (C) 10/28/2020    BUN 25 (H) 10/28/2020    CREATININE 0.76 10/28/2020    EGFRIFNONA 79 10/28/2020    BCR 32.9 (H) 10/28/2020    K 4.5 10/28/2020    CO2 22.0 10/28/2020    CALCIUM 9.9 10/28/2020    ALBUMIN 4.00 10/27/2020    AST 14 10/27/2020    ALT 15 10/27/2020    CHOL 197 07/26/2020    TRIG 238 (H) 07/26/2020    HDL 40 07/26/2020     (H) 07/26/2020     Lab Results   Component Value Date    HGBA1C 8.9 (H) 10/27/2020    HGBA1C 6.5 (H) 07/26/2020    HGBA1C 12.9 (H) 03/02/2020     Lab Results   Component Value Date    CREATININE 0.76 10/28/2020     Lab Results   Component Value Date    TSH 1.390 07/26/2020    FREET4 1.10 07/25/2020    THYROIDAB 17 03/03/2020       Medication Review: Reviewed.       Current Outpatient Medications:   •  Accu-Chek Guide test strip, , Disp: , Rfl:   •  albuterol sulfate  (90 Base) MCG/ACT inhaler, Inhale 2 puffs Every 4 (Four) Hours As Needed for Wheezing or Shortness of Air., Disp: 2 inhaler, Rfl: 0  •  B-D ULTRAFINE III SHORT PEN 31G X 8 MM misc, , Disp: , Rfl:   •  baclofen (LIORESAL) 10 MG tablet, Take 0.5 tablets by mouth 3 (Three) Times a Day As Needed for Muscle Spasms., Disp: , Rfl:   •  Cholecalciferol  "(Vitamin D3) 1.25 MG (91844 UT) capsule, , Disp: , Rfl:   •  folic acid (FOLVITE) 1 MG tablet, Take 1 tablet by mouth Daily., Disp: 360 tablet, Rfl: 0  •  gabapentin (NEURONTIN) 300 MG capsule, Take 1 capsule by mouth 2 (Two) Times a Day., Disp: , Rfl:   •  Insulin Glargine (BASAGLAR KWIKPEN) 100 UNIT/ML injection pen, Pt to inject SQ 30 units every night, Disp: 15 mL, Rfl: 0  •  levothyroxine (SYNTHROID, LEVOTHROID) 25 MCG tablet, Take 1 tablet by mouth Daily., Disp: 30 tablet, Rfl: 0  •  lisinopril (PRINIVIL,ZESTRIL) 5 MG tablet, , Disp: , Rfl:   •  meloxicam (MOBIC) 7.5 MG tablet, Take 7.5 mg by mouth Daily., Disp: , Rfl:   •  mupirocin (BACTROBAN) 2 % ointment, , Disp: , Rfl:   •  sertraline (ZOLOFT) 25 MG tablet, , Disp: , Rfl:   •  thiamine (VITAMIN B1) 100 MG tablet, Take 1 tablet by mouth Daily., Disp: 30 tablet, Rfl: 0  •  tiotropium (SPIRIVA HANDIHALER) 18 MCG per inhalation capsule, Place 1 capsule into inhaler and inhale Daily., Disp: 30 capsule, Rfl: 0  •  traZODone (DESYREL) 50 MG tablet, Take 50 mg by mouth Every Night., Disp: , Rfl:   •  TRUEplus Insulin Syringe 30G X 5/16\" 1 ML misc, , Disp: , Rfl:   •  Vitamin D, Cholecalciferol, 25 MCG (1000 UT) capsule, Take 1 capsule by mouth Daily. Sat-Thur  Pt takes 50,000u on Fri, Disp: , Rfl:   •  WIXELA INHUB 100-50 MCG/DOSE DISKUS, Inhale 1 puff 2 (Two) Times a Day., Disp: , Rfl:   •  escitalopram (LEXAPRO) 10 MG tablet, Take 10 mg by mouth Daily., Disp: , Rfl:   •  insulin lispro (humaLOG) 100 UNIT/ML injection, Pt to inject SQ 8 units plus sliding scale before each meal. MDD: 60, Disp: 20 mL, Rfl: 0  •  traMADol (ULTRAM) 50 MG tablet, , Disp: , Rfl:   •  vitamin D (ERGOCALCIFEROL) 1.25 MG (12001 UT) capsule capsule, Take 50,000 Units by mouth 1 (One) Time Per Week. Friday, Disp: , Rfl:     Assessment/Plan   1.  Diabetes mellitus type 1: Uncontrolled with very high blood sugars.  At this time I will increase Basaglar to 40 units subcu nightly and lispro " to 14 with each meal along with a sliding scale 1 unit for each 30 mg blood sugar over 140 at meals.  She is advised to send me blood sugar records for review and also refer her for insulin pump evaluation.    2.  Hypothyroidism: On levothyroxine supplementation, no labs available at this time.  Will check TSH and free T4 and then make further recommendations.                    José Miguel Regalado MD FACE.

## 2020-11-12 ENCOUNTER — TELEPHONE (OUTPATIENT)
Dept: ENDOCRINOLOGY | Facility: CLINIC | Age: 55
End: 2020-11-12

## 2020-11-12 NOTE — TELEPHONE ENCOUNTER
Increase Basaglar to 45 units subcu nightly and Admelog to 16 units with each meal and continue Admelog sliding scale and send blood sugar records in 1 to 2 weeks for review.

## 2020-12-03 ENCOUNTER — OFFICE VISIT (OUTPATIENT)
Dept: ENDOCRINOLOGY | Facility: CLINIC | Age: 55
End: 2020-12-03

## 2020-12-03 DIAGNOSIS — E10.65 TYPE 1 DIABETES MELLITUS WITH HYPERGLYCEMIA (HCC): ICD-10-CM

## 2020-12-03 PROCEDURE — G0109 DIAB MANAGE TRN IND/GROUP: HCPCS | Performed by: DIETITIAN, REGISTERED

## 2020-12-03 NOTE — PROGRESS NOTES
Pt was in class for 30 minutes.  Pt interested in trying to get a tslim and Dexcom.  Pt states she has several months of BG records if insurance needs them.  Will fax tslim AOB and Dexcom ppw after MD signs LMN.

## 2020-12-11 ENCOUNTER — TELEPHONE (OUTPATIENT)
Dept: ENDOCRINOLOGY | Facility: CLINIC | Age: 55
End: 2020-12-11

## 2020-12-11 NOTE — TELEPHONE ENCOUNTER
Called pt and LVM to call us back.  Need to know if pt has had DSME before and carb counting class.  If not, scheduled with RD ASAP.  Insurance requires proof of DSME and class on carb counting. Pump ppw on MD desk with OV for signature and then will be faxed to [facility]

## 2020-12-12 ENCOUNTER — INPATIENT HOSPITAL (OUTPATIENT)
Dept: URBAN - METROPOLITAN AREA HOSPITAL 76 | Facility: HOSPITAL | Age: 55
End: 2020-12-12
Payer: COMMERCIAL

## 2020-12-12 DIAGNOSIS — R41.82 ALTERED MENTAL STATUS, UNSPECIFIED: ICD-10-CM

## 2020-12-12 DIAGNOSIS — F10.10 ALCOHOL ABUSE, UNCOMPLICATED: ICD-10-CM

## 2020-12-12 PROCEDURE — 99222 1ST HOSP IP/OBS MODERATE 55: CPT | Performed by: INTERNAL MEDICINE

## 2020-12-14 ENCOUNTER — TELEPHONE (OUTPATIENT)
Dept: ENDOCRINOLOGY | Facility: CLINIC | Age: 55
End: 2020-12-14

## 2020-12-14 DIAGNOSIS — E10.65 UNCONTROLLED TYPE 1 DIABETES MELLITUS WITH HYPERGLYCEMIA (HCC): Primary | ICD-10-CM

## 2020-12-14 NOTE — TELEPHONE ENCOUNTER
Dr. GARG,    Pt reports she was admitted to [facility] with elevated BG's (compative behavior) and broken lower leg (in air cast) over weekend.  MD at [facility] wants pt to take Lactulose (??) 15 mg TID for 30 days for elevated liver phosphatase but warned pt of diarrhea.  Pt wants you to okay her taking this med before she agrees to take it.  BG's since hospital d/c scanned into phone note.  Per MD s/o, adjusted insulin and asked her to call in her BG's weekly. Updated Rx ready for signature. She is to have u/s of liver in 2 weeks.

## 2020-12-14 NOTE — TELEPHONE ENCOUNTER
Okay to take lactulose, increase Humalog to 20 with each meal and also add a sliding scale of Humalog 1 unit for each 20 mg blood sugar over 140 at meals.  Agree with Basaglar/Lantus at 55 units subcu nightly and asked patient to send blood sugar records in couple of weeks for review.

## 2020-12-15 RX ORDER — INSULIN GLARGINE 100 [IU]/ML
INJECTION, SOLUTION SUBCUTANEOUS
Qty: 30 ML | Refills: 2 | Status: SHIPPED | OUTPATIENT
Start: 2020-12-15 | End: 2022-01-03 | Stop reason: SDUPTHER

## 2020-12-22 ENCOUNTER — TELEPHONE (OUTPATIENT)
Dept: ENDOCRINOLOGY | Facility: CLINIC | Age: 55
End: 2020-12-22

## 2021-01-07 ENCOUNTER — TELEPHONE (OUTPATIENT)
Dept: ENDOCRINOLOGY | Facility: CLINIC | Age: 56
End: 2021-01-07

## 2021-01-07 NOTE — TELEPHONE ENCOUNTER
Rep from [facility] left VM to return call regarding dexcom supplies. Provided them with correct fax # to send PA request for us to fill out.

## 2021-01-11 ENCOUNTER — TELEPHONE (OUTPATIENT)
Dept: ENDOCRINOLOGY | Facility: CLINIC | Age: 56
End: 2021-01-11

## 2021-01-12 ENCOUNTER — TELEPHONE (OUTPATIENT)
Dept: ENDOCRINOLOGY | Facility: CLINIC | Age: 56
End: 2021-01-12

## 2021-01-13 ENCOUNTER — TELEPHONE (OUTPATIENT)
Dept: ENDOCRINOLOGY | Facility: CLINIC | Age: 56
End: 2021-01-13

## 2021-01-13 NOTE — TELEPHONE ENCOUNTER
Amy called and Left message that her BS was 448 at noon. She took her insulin as ordered. She is  having a little confusion and fatigue so they wanted to make sure we knew her BS she was suppose to call us. Daughter will be home with her soon. [phone]

## 2021-01-14 NOTE — TELEPHONE ENCOUNTER
Sharan from [facility] verbalized understanding that patient should be seen at urgent care or emergency department any time patient has confusion with abnormal blood sugar.

## 2021-01-17 ENCOUNTER — TELEPHONE (OUTPATIENT)
Dept: ENDOCRINOLOGY | Facility: CLINIC | Age: 56
End: 2021-01-17

## 2021-01-17 NOTE — TELEPHONE ENCOUNTER
"Pt called answering service due to >500 blood.  About to eat supper.  Blood sugar was >500 this morning.   Took Humalog 23 units.  I asked why she didn't take the sliding scale as directed (1 unit for each 20 mg bl sugar >140 ).  She said: \"all that insulin is making me fat\".  Still on steroids for 2 more days ( started 3 weeks ago while @ [facility] for pneumonia).  Plan:  Take 40 units Humalog now.  If blood sugar is >300 @ bedtime, take Humalog 20 units.  Increase Basaglar to 80 units ( taking 60 units QHS ).  Continue to drink plenty of water.  Call Julianne tomorrow to report blood sugars & further directions on insulin adjustments.  "

## 2021-01-18 ENCOUNTER — TELEPHONE (OUTPATIENT)
Dept: ENDOCRINOLOGY | Facility: CLINIC | Age: 56
End: 2021-01-18

## 2021-01-29 ENCOUNTER — OFFICE VISIT (OUTPATIENT)
Dept: ENDOCRINOLOGY | Facility: CLINIC | Age: 56
End: 2021-01-29

## 2021-01-29 DIAGNOSIS — E10.65 TYPE 1 DIABETES MELLITUS WITH HYPERGLYCEMIA (HCC): ICD-10-CM

## 2021-01-29 PROCEDURE — G0108 DIAB MANAGE TRN  PER INDIV: HCPCS | Performed by: DIETITIAN, REGISTERED

## 2021-01-29 NOTE — PROGRESS NOTES
Spent 45 mins with pt. Trained pt on their Dexcom G6 continuous glucose monitor, including identification of the transmitter & sensor, difference between SG & BG, mady (or ) set up, alerts, proper sensor placement including skin preparation, review of steps needed for ending sensor sesstion & changing sensors, review of transmitter & sensor replacement frequency.  Alerts set today: , LOW 80.

## 2021-03-10 RX ORDER — LEVOFLOXACIN 750 MG/1
TABLET ORAL
COMMUNITY
Start: 2020-12-23 | End: 2021-05-27

## 2021-03-10 RX ORDER — DOXYCYCLINE 100 MG/1
CAPSULE ORAL
COMMUNITY
Start: 2020-12-18 | End: 2021-03-16

## 2021-03-10 RX ORDER — IBUPROFEN 800 MG/1
TABLET ORAL
COMMUNITY
Start: 2020-12-14 | End: 2021-05-27

## 2021-03-10 RX ORDER — HYDROCODONE BITARTRATE AND ACETAMINOPHEN 7.5; 325 MG/1; MG/1
TABLET ORAL
COMMUNITY
Start: 2020-12-17 | End: 2021-03-16

## 2021-03-10 RX ORDER — LACTULOSE 10 G/15ML
SOLUTION ORAL
COMMUNITY
Start: 2020-12-14 | End: 2021-03-16

## 2021-03-10 RX ORDER — METHYLPREDNISOLONE 4 MG/1
TABLET ORAL
COMMUNITY
Start: 2020-12-17 | End: 2021-03-16

## 2021-03-10 RX ORDER — HYDROCODONE BITARTRATE AND ACETAMINOPHEN 5; 325 MG/1; MG/1
TABLET ORAL
COMMUNITY
Start: 2020-12-14 | End: 2021-03-16

## 2021-03-16 ENCOUNTER — TELEMEDICINE (OUTPATIENT)
Dept: ENDOCRINOLOGY | Facility: CLINIC | Age: 56
End: 2021-03-16

## 2021-03-16 VITALS
HEIGHT: 62 IN | WEIGHT: 150 LBS | BODY MASS INDEX: 27.6 KG/M2 | SYSTOLIC BLOOD PRESSURE: 140 MMHG | DIASTOLIC BLOOD PRESSURE: 87 MMHG

## 2021-03-16 DIAGNOSIS — E10.65 TYPE 1 DIABETES MELLITUS WITH HYPERGLYCEMIA (HCC): Primary | ICD-10-CM

## 2021-03-16 DIAGNOSIS — E03.9 ACQUIRED HYPOTHYROIDISM: ICD-10-CM

## 2021-03-16 PROCEDURE — 99214 OFFICE O/P EST MOD 30 MIN: CPT | Performed by: INTERNAL MEDICINE

## 2021-03-16 NOTE — PATIENT INSTRUCTIONS
Continue current management  Always keep glucose source in case of low blood sugar  Annual eye exam and flu vaccine  Labs before follow-up.  Get your labs done in the next few days.

## 2021-03-16 NOTE — PROGRESS NOTES
Endocrine Outpatient Progress Note    Patient Care Team:  Ryne Hood APRN as PCP - General (Nurse Practitioner)   You have chosen to receive care through a telehealth visit.  Do you consent to use a video/audio connection for your medical care today? Yes.     Chief Complaint: Follow-up type 1 diabetes: Visit conducted through audio and video conference utilizing [employer].     HPI: 54-year-old female with history of type 1 diabetes, hypothyroidism was followed through telehealth.    For type 1 diabetes: She is currently on Lantus 55 units at bedtime with Humalog 20 units with each meal and her blood sugars are around 200. No low BS. She denies any low blood sugars.  She is trying to follow the diet.      Hypothyroidism: On levothyroxine supplementation.    Past Medical History:   Diagnosis Date   • Diabetes mellitus type I (CMS/[facility])    • Lupus (CMS/[facility])        Social History     Socioeconomic History   • Marital status: Single     Spouse name: Not on file   • Number of children: Not on file   • Years of education: Not on file   • Highest education level: Not on file   Tobacco Use   • Smoking status: Former Smoker     Packs/day: 0.50   • Smokeless tobacco: Never Used   Vaping Use   • Vaping Use: Every day   • Start date: 3/1/2020   • Substances: Nicotine   • Devices: Pre-filled pod   Substance and Sexual Activity   • Alcohol use: Not Currently     Alcohol/week: 4.0 standard drinks     Types: 4 Shots of liquor per week     Comment: a day   • Drug use: Never   • Sexual activity: Defer       Family History   Problem Relation Age of Onset   • Thyroid disease Mother    • Heart disease Mother    • Cancer Father    • Heart disease Father        Allergies   Allergen Reactions   • Penicillins Anaphylaxis       ROS:   Constitutional:  Denies fatigue, tiredness.    Eyes:  Denies change in visual acuity   HENT:  Denies nasal congestion or sore throat   Respiratory: denies cough, shortness of breath.   Cardiovascular:   denies chest pain, edema   GI:  Denies abdominal pain, nausea, vomiting.    :  Denies dysuria   Musculoskeletal:  Denies back pain or joint pain   Integument:  Denies dry skin, rash   Neurologic:  Denies headache, focal weakness or sensory changes   Endocrine:  Denies polyuria or polydipsia   Psychiatric:  Denies depression or anxiety      Vitals:    03/16/21 1107   BP: 140/87        Physical Exam:  GEN: NAD, conversant  PSYCH: AOX3, appropriate mood, affect normal      Results Review:     I reviewed the patient's new clinical results.    Lab Results   Component Value Date    GLUCOSE 516 (C) 10/28/2020    BUN 25 (H) 10/28/2020    CREATININE 0.76 10/28/2020    EGFRIFNONA 79 10/28/2020    BCR 32.9 (H) 10/28/2020    K 4.5 10/28/2020    CO2 22.0 10/28/2020    CALCIUM 9.9 10/28/2020    ALBUMIN 4.00 10/27/2020    AST 14 10/27/2020    ALT 15 10/27/2020    CHOL 197 07/26/2020    TRIG 238 (H) 07/26/2020    HDL 40 07/26/2020     (H) 07/26/2020     Lab Results   Component Value Date    HGBA1C 8.9 (H) 10/27/2020    HGBA1C 6.5 (H) 07/26/2020    HGBA1C 12.9 (H) 03/02/2020     Lab Results   Component Value Date    CREATININE 0.76 10/28/2020     Lab Results   Component Value Date    TSH 1.390 07/26/2020    FREET4 1.10 07/25/2020    THYROIDAB 17 03/03/2020       Medication Review: Reviewed.       Current Outpatient Medications:   •  Accu-Chek Guide test strip, , Disp: , Rfl:   •  albuterol sulfate  (90 Base) MCG/ACT inhaler, Inhale 2 puffs Every 4 (Four) Hours As Needed for Wheezing or Shortness of Air., Disp: 2 inhaler, Rfl: 0  •  B-D ULTRAFINE III SHORT PEN 31G X 8 MM misc, , Disp: , Rfl:   •  baclofen (LIORESAL) 10 MG tablet, Take 0.5 tablets by mouth 3 (Three) Times a Day As Needed for Muscle Spasms., Disp: , Rfl:   •  Cholecalciferol (Vitamin D3) 1.25 MG (31311 UT) capsule, , Disp: , Rfl:   •  folic acid (FOLVITE) 1 MG tablet, Take 1 tablet by mouth Daily., Disp: 360 tablet, Rfl: 0  •  gabapentin (NEURONTIN) 300  "MG capsule, Take 1 capsule by mouth 2 (Two) Times a Day., Disp: , Rfl:   •  ibuprofen (ADVIL,MOTRIN) 800 MG tablet, , Disp: , Rfl:   •  Insulin Glargine (BASAGLAR KWIKPEN) 100 UNIT/ML injection pen, Pt to inject SQ 55 units every night, Disp: 30 mL, Rfl: 2  •  insulin lispro (humaLOG) 100 UNIT/ML injection, Pt to inject SQ 20 units plus sliding scale before each meal. MDD: 100, Disp: 30 mL, Rfl: 2  •  levoFLOXacin (LEVAQUIN) 750 MG tablet, , Disp: , Rfl:   •  levothyroxine (SYNTHROID, LEVOTHROID) 25 MCG tablet, Take 1 tablet by mouth Daily., Disp: 30 tablet, Rfl: 0  •  lisinopril (PRINIVIL,ZESTRIL) 5 MG tablet, , Disp: , Rfl:   •  meloxicam (MOBIC) 7.5 MG tablet, Take 7.5 mg by mouth Daily., Disp: , Rfl:   •  sertraline (ZOLOFT) 25 MG tablet, , Disp: , Rfl:   •  sertraline (ZOLOFT) 50 MG tablet, , Disp: , Rfl:   •  thiamine (VITAMIN B1) 100 MG tablet, Take 1 tablet by mouth Daily., Disp: 30 tablet, Rfl: 0  •  tiotropium (SPIRIVA HANDIHALER) 18 MCG per inhalation capsule, Place 1 capsule into inhaler and inhale Daily., Disp: 30 capsule, Rfl: 0  •  traMADol (ULTRAM) 50 MG tablet, , Disp: , Rfl:   •  traZODone (DESYREL) 50 MG tablet, Take 50 mg by mouth Every Night., Disp: , Rfl:   •  TRUEplus Insulin Syringe 30G X 5/16\" 1 ML misc, , Disp: , Rfl:   •  vitamin D (ERGOCALCIFEROL) 1.25 MG (44226 UT) capsule capsule, Take 50,000 Units by mouth 1 (One) Time Per Week. Friday, Disp: , Rfl:   •  Vitamin D, Cholecalciferol, 25 MCG (1000 UT) capsule, Take 1 capsule by mouth Daily. Sat-Thur  Pt takes 50,000u on Fri, Disp: , Rfl:   •  WIXELA INHUB 100-50 MCG/DOSE DISKUS, Inhale 1 puff 2 (Two) Times a Day., Disp: , Rfl:   •  doxycycline (MONODOX) 100 MG capsule, , Disp: , Rfl:   •  escitalopram (LEXAPRO) 10 MG tablet, Take 10 mg by mouth Daily., Disp: , Rfl:   •  HYDROcodone-acetaminophen (NORCO) 5-325 MG per tablet, , Disp: , Rfl:   •  HYDROcodone-acetaminophen (NORCO) 7.5-325 MG per tablet, , Disp: , Rfl:   •  lactulose " (CHRONULAC) 10 GM/15ML solution, , Disp: , Rfl:   •  methylPREDNISolone (MEDROL) 4 MG dose pack, , Disp: , Rfl:   •  mupirocin (BACTROBAN) 2 % ointment, , Disp: , Rfl:   •  nystatin (MYCOSTATIN) 071454 UNIT/ML suspension, , Disp: , Rfl:     Assessment/Plan   1.  Diabetes mellitus type 1: Uncontrolled with blood sugars around 200.  She is hesitant to increase insulin dose further at this time.  Will check A1c.  She is advised to always keep glucose source in case of low blood sugar.  She is in process to get insulin pump and monitoring system.    2.  Hypothyroidism: On levothyroxine supplementation, no labs available at this time.  Will check TSH and free T4 and then make further recommendations.                    José Miguel Regalado MD FACE.

## 2021-05-26 ENCOUNTER — HOSPITAL ENCOUNTER (OUTPATIENT)
Facility: HOSPITAL | Age: 56
Setting detail: OBSERVATION
Discharge: LEFT BEFORE DISCHARGE | End: 2021-05-27
Attending: EMERGENCY MEDICINE | Admitting: INTERNAL MEDICINE

## 2021-05-26 ENCOUNTER — TELEPHONE (OUTPATIENT)
Dept: ENDOCRINOLOGY | Facility: CLINIC | Age: 56
End: 2021-05-26

## 2021-05-26 ENCOUNTER — APPOINTMENT (OUTPATIENT)
Dept: CT IMAGING | Facility: HOSPITAL | Age: 56
End: 2021-05-26

## 2021-05-26 DIAGNOSIS — J44.1 COPD EXACERBATION (HCC): ICD-10-CM

## 2021-05-26 DIAGNOSIS — R07.9 CHEST PAIN, UNSPECIFIED TYPE: Primary | ICD-10-CM

## 2021-05-26 PROBLEM — R00.0 TACHYCARDIA: Status: ACTIVE | Noted: 2021-05-26

## 2021-05-26 LAB
ALBUMIN SERPL-MCNC: 4.1 G/DL (ref 3.5–5.2)
ALBUMIN/GLOB SERPL: 1.4 G/DL
ALP SERPL-CCNC: 124 U/L (ref 39–117)
ALT SERPL W P-5'-P-CCNC: 13 U/L (ref 1–33)
ANION GAP SERPL CALCULATED.3IONS-SCNC: 15 MMOL/L (ref 5–15)
APTT PPP: 28.2 SECONDS (ref 24–31)
AST SERPL-CCNC: 18 U/L (ref 1–32)
BASOPHILS # BLD MANUAL: 0.34 10*3/MM3 (ref 0–0.2)
BASOPHILS NFR BLD AUTO: 4 % (ref 0–1.5)
BILIRUB SERPL-MCNC: <0.2 MG/DL (ref 0–1.2)
BUN SERPL-MCNC: 11 MG/DL (ref 6–20)
BUN/CREAT SERPL: 16.4 (ref 7–25)
CALCIUM SPEC-SCNC: 9 MG/DL (ref 8.6–10.5)
CHLORIDE SERPL-SCNC: 100 MMOL/L (ref 98–107)
CO2 SERPL-SCNC: 22 MMOL/L (ref 22–29)
CREAT SERPL-MCNC: 0.67 MG/DL (ref 0.57–1)
DEPRECATED RDW RBC AUTO: 43.3 FL (ref 37–54)
EOSINOPHIL # BLD MANUAL: 0.52 10*3/MM3 (ref 0–0.4)
EOSINOPHIL NFR BLD MANUAL: 6 % (ref 0.3–6.2)
ERYTHROCYTE [DISTWIDTH] IN BLOOD BY AUTOMATED COUNT: 12.7 % (ref 12.3–15.4)
GFR SERPL CREATININE-BSD FRML MDRD: 91 ML/MIN/1.73
GLOBULIN UR ELPH-MCNC: 3 GM/DL
GLUCOSE SERPL-MCNC: 326 MG/DL (ref 65–99)
HCT VFR BLD AUTO: 40.2 % (ref 34–46.6)
HGB BLD-MCNC: 14.2 G/DL (ref 12–15.9)
INR PPP: <0.93 (ref 0.93–1.1)
LYMPHOCYTES # BLD MANUAL: 3.44 10*3/MM3 (ref 0.7–3.1)
LYMPHOCYTES NFR BLD MANUAL: 14 % (ref 5–12)
LYMPHOCYTES NFR BLD MANUAL: 38 % (ref 19.6–45.3)
MCH RBC QN AUTO: 33.8 PG (ref 26.6–33)
MCHC RBC AUTO-ENTMCNC: 35.3 G/DL (ref 31.5–35.7)
MCV RBC AUTO: 95.7 FL (ref 79–97)
MONOCYTES # BLD AUTO: 1.2 10*3/MM3 (ref 0.1–0.9)
NEUTROPHILS # BLD AUTO: 3.1 10*3/MM3 (ref 1.7–7)
NEUTROPHILS NFR BLD MANUAL: 36 % (ref 42.7–76)
NT-PROBNP SERPL-MCNC: 8.4 PG/ML (ref 0–900)
PATHOLOGY REVIEW: YES
PLAT MORPH BLD: NORMAL
PLATELET # BLD AUTO: 414 10*3/MM3 (ref 140–450)
PMV BLD AUTO: 9.3 FL (ref 6–12)
POTASSIUM SERPL-SCNC: 4.6 MMOL/L (ref 3.5–5.2)
PROT SERPL-MCNC: 7.1 G/DL (ref 6–8.5)
PROTHROMBIN TIME: 9.9 SECONDS (ref 9.6–11.7)
RBC # BLD AUTO: 4.2 10*6/MM3 (ref 3.77–5.28)
RBC MORPH BLD: NORMAL
SARS-COV-2 RNA PNL SPEC NAA+PROBE: NORMAL
SCAN SLIDE: NORMAL
SODIUM SERPL-SCNC: 137 MMOL/L (ref 136–145)
T4 FREE SERPL-MCNC: 1.12 NG/DL (ref 0.93–1.7)
TROPONIN T SERPL-MCNC: <0.01 NG/ML (ref 0–0.03)
TROPONIN T SERPL-MCNC: <0.01 NG/ML (ref 0–0.03)
TSH SERPL DL<=0.05 MIU/L-ACNC: 0.87 UIU/ML (ref 0.27–4.2)
VARIANT LYMPHS NFR BLD MANUAL: 2 % (ref 0–5)
WBC # BLD AUTO: 8.6 10*3/MM3 (ref 3.4–10.8)
WBC MORPH BLD: NORMAL

## 2021-05-26 PROCEDURE — 84484 ASSAY OF TROPONIN QUANT: CPT | Performed by: EMERGENCY MEDICINE

## 2021-05-26 PROCEDURE — 84481 FREE ASSAY (FT-3): CPT | Performed by: INTERNAL MEDICINE

## 2021-05-26 PROCEDURE — 87635 SARS-COV-2 COVID-19 AMP PRB: CPT | Performed by: EMERGENCY MEDICINE

## 2021-05-26 PROCEDURE — G0378 HOSPITAL OBSERVATION PER HR: HCPCS

## 2021-05-26 PROCEDURE — 96375 TX/PRO/DX INJ NEW DRUG ADDON: CPT

## 2021-05-26 PROCEDURE — 80050 GENERAL HEALTH PANEL: CPT | Performed by: EMERGENCY MEDICINE

## 2021-05-26 PROCEDURE — 94640 AIRWAY INHALATION TREATMENT: CPT

## 2021-05-26 PROCEDURE — 93005 ELECTROCARDIOGRAM TRACING: CPT

## 2021-05-26 PROCEDURE — 85610 PROTHROMBIN TIME: CPT | Performed by: EMERGENCY MEDICINE

## 2021-05-26 PROCEDURE — C9803 HOPD COVID-19 SPEC COLLECT: HCPCS

## 2021-05-26 PROCEDURE — 71275 CT ANGIOGRAPHY CHEST: CPT

## 2021-05-26 PROCEDURE — 0 IOPAMIDOL PER 1 ML: Performed by: EMERGENCY MEDICINE

## 2021-05-26 PROCEDURE — 99220 PR INITIAL OBSERVATION CARE/DAY 70 MINUTES: CPT | Performed by: INTERNAL MEDICINE

## 2021-05-26 PROCEDURE — 94760 N-INVAS EAR/PLS OXIMETRY 1: CPT

## 2021-05-26 PROCEDURE — 25010000002 METHYLPREDNISOLONE PER 125 MG: Performed by: EMERGENCY MEDICINE

## 2021-05-26 PROCEDURE — 84439 ASSAY OF FREE THYROXINE: CPT | Performed by: INTERNAL MEDICINE

## 2021-05-26 PROCEDURE — 96374 THER/PROPH/DIAG INJ IV PUSH: CPT

## 2021-05-26 PROCEDURE — 93005 ELECTROCARDIOGRAM TRACING: CPT | Performed by: EMERGENCY MEDICINE

## 2021-05-26 PROCEDURE — 83036 HEMOGLOBIN GLYCOSYLATED A1C: CPT | Performed by: INTERNAL MEDICINE

## 2021-05-26 PROCEDURE — 85730 THROMBOPLASTIN TIME PARTIAL: CPT | Performed by: EMERGENCY MEDICINE

## 2021-05-26 PROCEDURE — 99284 EMERGENCY DEPT VISIT MOD MDM: CPT

## 2021-05-26 PROCEDURE — 83880 ASSAY OF NATRIURETIC PEPTIDE: CPT | Performed by: EMERGENCY MEDICINE

## 2021-05-26 PROCEDURE — 94799 UNLISTED PULMONARY SVC/PX: CPT

## 2021-05-26 PROCEDURE — 36415 COLL VENOUS BLD VENIPUNCTURE: CPT

## 2021-05-26 PROCEDURE — 85007 BL SMEAR W/DIFF WBC COUNT: CPT | Performed by: EMERGENCY MEDICINE

## 2021-05-26 RX ORDER — SODIUM CHLORIDE 0.9 % (FLUSH) 0.9 %
10 SYRINGE (ML) INJECTION AS NEEDED
Status: DISCONTINUED | OUTPATIENT
Start: 2021-05-26 | End: 2021-05-27 | Stop reason: HOSPADM

## 2021-05-26 RX ORDER — PANTOPRAZOLE SODIUM 40 MG/10ML
40 INJECTION, POWDER, LYOPHILIZED, FOR SOLUTION INTRAVENOUS ONCE
Status: COMPLETED | OUTPATIENT
Start: 2021-05-26 | End: 2021-05-26

## 2021-05-26 RX ORDER — IPRATROPIUM BROMIDE AND ALBUTEROL SULFATE 2.5; .5 MG/3ML; MG/3ML
3 SOLUTION RESPIRATORY (INHALATION) ONCE
Status: COMPLETED | OUTPATIENT
Start: 2021-05-26 | End: 2021-05-26

## 2021-05-26 RX ORDER — METHYLPREDNISOLONE SODIUM SUCCINATE 125 MG/2ML
125 INJECTION, POWDER, LYOPHILIZED, FOR SOLUTION INTRAMUSCULAR; INTRAVENOUS ONCE
Status: COMPLETED | OUTPATIENT
Start: 2021-05-26 | End: 2021-05-26

## 2021-05-26 RX ADMIN — IPRATROPIUM BROMIDE AND ALBUTEROL SULFATE 3 ML: 2.5; .5 SOLUTION RESPIRATORY (INHALATION) at 22:12

## 2021-05-26 RX ADMIN — METHYLPREDNISOLONE SODIUM SUCCINATE 125 MG: 125 INJECTION, POWDER, FOR SOLUTION INTRAMUSCULAR; INTRAVENOUS at 19:42

## 2021-05-26 RX ADMIN — PANTOPRAZOLE SODIUM 40 MG: 40 INJECTION, POWDER, FOR SOLUTION INTRAVENOUS at 20:02

## 2021-05-26 RX ADMIN — IOPAMIDOL 81 ML: 755 INJECTION, SOLUTION INTRAVENOUS at 21:07

## 2021-05-26 NOTE — TELEPHONE ENCOUNTER
Pt left VM that her Dexcom was reading 'HI'. When I called back, pt stated that occurred 2 hours ago. Pt used her BG meter while on the phone to check her BG and it had gone down to 255 with her Dexcom now reading 273. She stated that she has been having problems with her BP/heart rate and it may be related to this elevated BG. She is supposed to be referred to see a cardiologist regarding this. Advised pt to drink lots of water, and recommended she go out to buy keto stix since she did not have any at home.

## 2021-05-27 VITALS
WEIGHT: 174.82 LBS | TEMPERATURE: 98.4 F | RESPIRATION RATE: 19 BRPM | SYSTOLIC BLOOD PRESSURE: 149 MMHG | HEIGHT: 62 IN | DIASTOLIC BLOOD PRESSURE: 88 MMHG | BODY MASS INDEX: 32.17 KG/M2 | OXYGEN SATURATION: 96 % | HEART RATE: 108 BPM

## 2021-05-27 LAB
ANION GAP SERPL CALCULATED.3IONS-SCNC: 16 MMOL/L (ref 5–15)
BUN SERPL-MCNC: 17 MG/DL (ref 6–20)
BUN/CREAT SERPL: 21.3 (ref 7–25)
CALCIUM SPEC-SCNC: 9.3 MG/DL (ref 8.6–10.5)
CHLORIDE SERPL-SCNC: 97 MMOL/L (ref 98–107)
CO2 SERPL-SCNC: 19 MMOL/L (ref 22–29)
CREAT SERPL-MCNC: 0.8 MG/DL (ref 0.57–1)
GFR SERPL CREATININE-BSD FRML MDRD: 74 ML/MIN/1.73
GLUCOSE BLDC GLUCOMTR-MCNC: 287 MG/DL (ref 70–105)
GLUCOSE BLDC GLUCOMTR-MCNC: 443 MG/DL (ref 70–105)
GLUCOSE SERPL-MCNC: 419 MG/DL (ref 65–99)
HBA1C MFR BLD: 10.1 % (ref 3.5–5.6)
LAB AP CASE REPORT: NORMAL
NT-PROBNP SERPL-MCNC: 11.7 PG/ML (ref 0–900)
PATH REPORT.FINAL DX SPEC: NORMAL
POTASSIUM SERPL-SCNC: 4.4 MMOL/L (ref 3.5–5.2)
SODIUM SERPL-SCNC: 132 MMOL/L (ref 136–145)
T3FREE SERPL-MCNC: 2.55 PG/ML (ref 2–4.4)
TROPONIN T SERPL-MCNC: <0.01 NG/ML (ref 0–0.03)

## 2021-05-27 PROCEDURE — 25010000002 METHYLPREDNISOLONE PER 40 MG: Performed by: INTERNAL MEDICINE

## 2021-05-27 PROCEDURE — 96376 TX/PRO/DX INJ SAME DRUG ADON: CPT

## 2021-05-27 PROCEDURE — 63710000001 INSULIN GLARGINE PER 5 UNITS: Performed by: INTERNAL MEDICINE

## 2021-05-27 PROCEDURE — G0378 HOSPITAL OBSERVATION PER HR: HCPCS

## 2021-05-27 PROCEDURE — 82962 GLUCOSE BLOOD TEST: CPT

## 2021-05-27 PROCEDURE — 63710000001 INSULIN LISPRO (HUMAN) PER 5 UNITS: Performed by: INTERNAL MEDICINE

## 2021-05-27 PROCEDURE — 94799 UNLISTED PULMONARY SVC/PX: CPT

## 2021-05-27 PROCEDURE — 80048 BASIC METABOLIC PNL TOTAL CA: CPT | Performed by: INTERNAL MEDICINE

## 2021-05-27 PROCEDURE — 84484 ASSAY OF TROPONIN QUANT: CPT | Performed by: INTERNAL MEDICINE

## 2021-05-27 PROCEDURE — 83880 ASSAY OF NATRIURETIC PEPTIDE: CPT | Performed by: INTERNAL MEDICINE

## 2021-05-27 PROCEDURE — 99217 PR OBSERVATION CARE DISCHARGE MANAGEMENT: CPT | Performed by: INTERNAL MEDICINE

## 2021-05-27 RX ORDER — HYDROXYZINE HYDROCHLORIDE 25 MG/1
25 TABLET, FILM COATED ORAL 3 TIMES DAILY PRN
Status: DISCONTINUED | OUTPATIENT
Start: 2021-05-27 | End: 2021-05-27 | Stop reason: HOSPADM

## 2021-05-27 RX ORDER — GABAPENTIN 300 MG/1
300 CAPSULE ORAL EVERY 8 HOURS SCHEDULED
Status: DISCONTINUED | OUTPATIENT
Start: 2021-05-27 | End: 2021-05-27 | Stop reason: HOSPADM

## 2021-05-27 RX ORDER — INSULIN LISPRO 100 [IU]/ML
18 INJECTION, SOLUTION INTRAVENOUS; SUBCUTANEOUS
Status: DISCONTINUED | OUTPATIENT
Start: 2021-05-27 | End: 2021-05-27

## 2021-05-27 RX ORDER — SODIUM CHLORIDE 0.9 % (FLUSH) 0.9 %
3 SYRINGE (ML) INJECTION EVERY 12 HOURS SCHEDULED
Status: DISCONTINUED | OUTPATIENT
Start: 2021-05-27 | End: 2021-05-27 | Stop reason: HOSPADM

## 2021-05-27 RX ORDER — INSULIN GLARGINE 100 [IU]/ML
55 INJECTION, SOLUTION SUBCUTANEOUS NIGHTLY
Status: DISCONTINUED | OUTPATIENT
Start: 2021-05-27 | End: 2021-05-27 | Stop reason: HOSPADM

## 2021-05-27 RX ORDER — FOLIC ACID 1 MG/1
1 TABLET ORAL DAILY
Status: DISCONTINUED | OUTPATIENT
Start: 2021-05-27 | End: 2021-05-27 | Stop reason: HOSPADM

## 2021-05-27 RX ORDER — ONDANSETRON 4 MG/1
4 TABLET, FILM COATED ORAL EVERY 6 HOURS PRN
Status: DISCONTINUED | OUTPATIENT
Start: 2021-05-27 | End: 2021-05-27 | Stop reason: HOSPADM

## 2021-05-27 RX ORDER — TRAZODONE HYDROCHLORIDE 50 MG/1
50 TABLET ORAL NIGHTLY
Status: DISCONTINUED | OUTPATIENT
Start: 2021-05-27 | End: 2021-05-27 | Stop reason: HOSPADM

## 2021-05-27 RX ORDER — SODIUM CHLORIDE 0.9 % (FLUSH) 0.9 %
3-10 SYRINGE (ML) INJECTION AS NEEDED
Status: DISCONTINUED | OUTPATIENT
Start: 2021-05-27 | End: 2021-05-27 | Stop reason: HOSPADM

## 2021-05-27 RX ORDER — METHYLPREDNISOLONE SODIUM SUCCINATE 40 MG/ML
40 INJECTION, POWDER, LYOPHILIZED, FOR SOLUTION INTRAMUSCULAR; INTRAVENOUS EVERY 6 HOURS
Status: DISCONTINUED | OUTPATIENT
Start: 2021-05-27 | End: 2021-05-27

## 2021-05-27 RX ORDER — METHYLPREDNISOLONE SODIUM SUCCINATE 40 MG/ML
40 INJECTION, POWDER, LYOPHILIZED, FOR SOLUTION INTRAMUSCULAR; INTRAVENOUS EVERY 8 HOURS
Status: DISCONTINUED | OUTPATIENT
Start: 2021-05-27 | End: 2021-05-27

## 2021-05-27 RX ORDER — LISINOPRIL 20 MG/1
20 TABLET ORAL DAILY
COMMUNITY

## 2021-05-27 RX ORDER — INSULIN LISPRO 100 [IU]/ML
0-7 INJECTION, SOLUTION INTRAVENOUS; SUBCUTANEOUS AS NEEDED
Status: DISCONTINUED | OUTPATIENT
Start: 2021-05-27 | End: 2021-05-27 | Stop reason: HOSPADM

## 2021-05-27 RX ORDER — DEXTROSE MONOHYDRATE 25 G/50ML
25 INJECTION, SOLUTION INTRAVENOUS
Status: DISCONTINUED | OUTPATIENT
Start: 2021-05-27 | End: 2021-05-27 | Stop reason: HOSPADM

## 2021-05-27 RX ORDER — ACETAMINOPHEN 650 MG/1
650 SUPPOSITORY RECTAL EVERY 4 HOURS PRN
Status: DISCONTINUED | OUTPATIENT
Start: 2021-05-27 | End: 2021-05-27 | Stop reason: HOSPADM

## 2021-05-27 RX ORDER — CHOLECALCIFEROL (VITAMIN D3) 125 MCG
5 CAPSULE ORAL NIGHTLY PRN
Status: DISCONTINUED | OUTPATIENT
Start: 2021-05-27 | End: 2021-05-27 | Stop reason: HOSPADM

## 2021-05-27 RX ORDER — INSULIN LISPRO 100 [IU]/ML
6 INJECTION, SOLUTION INTRAVENOUS; SUBCUTANEOUS ONCE
Status: COMPLETED | OUTPATIENT
Start: 2021-05-27 | End: 2021-05-27

## 2021-05-27 RX ORDER — INSULIN LISPRO 100 [IU]/ML
0-7 INJECTION, SOLUTION INTRAVENOUS; SUBCUTANEOUS
Status: DISCONTINUED | OUTPATIENT
Start: 2021-05-27 | End: 2021-05-27 | Stop reason: HOSPADM

## 2021-05-27 RX ORDER — CYCLOBENZAPRINE HCL 10 MG
5 TABLET ORAL 3 TIMES DAILY PRN
Status: DISCONTINUED | OUTPATIENT
Start: 2021-05-27 | End: 2021-05-27 | Stop reason: HOSPADM

## 2021-05-27 RX ORDER — SERTRALINE HYDROCHLORIDE 25 MG/1
25 TABLET, FILM COATED ORAL DAILY
Status: DISCONTINUED | OUTPATIENT
Start: 2021-05-27 | End: 2021-05-27

## 2021-05-27 RX ORDER — CYCLOBENZAPRINE HCL 5 MG
5 TABLET ORAL 3 TIMES DAILY PRN
COMMUNITY

## 2021-05-27 RX ORDER — BISACODYL 10 MG
10 SUPPOSITORY, RECTAL RECTAL DAILY PRN
Status: DISCONTINUED | OUTPATIENT
Start: 2021-05-27 | End: 2021-05-27 | Stop reason: HOSPADM

## 2021-05-27 RX ORDER — INSULIN LISPRO 100 [IU]/ML
20 INJECTION, SOLUTION INTRAVENOUS; SUBCUTANEOUS
Status: DISCONTINUED | OUTPATIENT
Start: 2021-05-27 | End: 2021-05-27 | Stop reason: HOSPADM

## 2021-05-27 RX ORDER — NICOTINE POLACRILEX 4 MG
15 LOZENGE BUCCAL
Status: DISCONTINUED | OUTPATIENT
Start: 2021-05-27 | End: 2021-05-27 | Stop reason: HOSPADM

## 2021-05-27 RX ORDER — IPRATROPIUM BROMIDE AND ALBUTEROL SULFATE 2.5; .5 MG/3ML; MG/3ML
3 SOLUTION RESPIRATORY (INHALATION)
Status: DISCONTINUED | OUTPATIENT
Start: 2021-05-27 | End: 2021-05-27 | Stop reason: HOSPADM

## 2021-05-27 RX ORDER — POLYETHYLENE GLYCOL 3350 17 G/17G
17 POWDER, FOR SOLUTION ORAL DAILY PRN
Status: DISCONTINUED | OUTPATIENT
Start: 2021-05-27 | End: 2021-05-27 | Stop reason: HOSPADM

## 2021-05-27 RX ORDER — GABAPENTIN 300 MG/1
300 CAPSULE ORAL 2 TIMES DAILY
Status: DISCONTINUED | OUTPATIENT
Start: 2021-05-27 | End: 2021-05-27

## 2021-05-27 RX ORDER — ACETAMINOPHEN 160 MG/5ML
650 SOLUTION ORAL EVERY 4 HOURS PRN
Status: DISCONTINUED | OUTPATIENT
Start: 2021-05-27 | End: 2021-05-27 | Stop reason: HOSPADM

## 2021-05-27 RX ORDER — ONDANSETRON 2 MG/ML
4 INJECTION INTRAMUSCULAR; INTRAVENOUS EVERY 6 HOURS PRN
Status: DISCONTINUED | OUTPATIENT
Start: 2021-05-27 | End: 2021-05-27 | Stop reason: HOSPADM

## 2021-05-27 RX ORDER — AMOXICILLIN 250 MG
2 CAPSULE ORAL 2 TIMES DAILY
Status: DISCONTINUED | OUTPATIENT
Start: 2021-05-27 | End: 2021-05-27 | Stop reason: HOSPADM

## 2021-05-27 RX ORDER — SERTRALINE HYDROCHLORIDE 25 MG/1
25 TABLET, FILM COATED ORAL DAILY
COMMUNITY
End: 2021-05-27

## 2021-05-27 RX ORDER — BISACODYL 5 MG/1
5 TABLET, DELAYED RELEASE ORAL DAILY PRN
Status: DISCONTINUED | OUTPATIENT
Start: 2021-05-27 | End: 2021-05-27 | Stop reason: HOSPADM

## 2021-05-27 RX ORDER — IBUPROFEN 200 MG
200 TABLET ORAL EVERY 6 HOURS PRN
COMMUNITY

## 2021-05-27 RX ORDER — LEVOTHYROXINE SODIUM 0.03 MG/1
25 TABLET ORAL DAILY
Status: DISCONTINUED | OUTPATIENT
Start: 2021-05-28 | End: 2021-05-27 | Stop reason: HOSPADM

## 2021-05-27 RX ORDER — ACETAMINOPHEN 325 MG/1
650 TABLET ORAL EVERY 4 HOURS PRN
Status: DISCONTINUED | OUTPATIENT
Start: 2021-05-27 | End: 2021-05-27 | Stop reason: HOSPADM

## 2021-05-27 RX ADMIN — INSULIN LISPRO 7 UNITS: 100 INJECTION, SOLUTION INTRAVENOUS; SUBCUTANEOUS at 07:31

## 2021-05-27 RX ADMIN — METHYLPREDNISOLONE SODIUM SUCCINATE 40 MG: 40 INJECTION, POWDER, FOR SOLUTION INTRAMUSCULAR; INTRAVENOUS at 05:17

## 2021-05-27 RX ADMIN — INSULIN GLARGINE 55 UNITS: 100 INJECTION, SOLUTION SUBCUTANEOUS at 01:58

## 2021-05-27 RX ADMIN — DOCUSATE SODIUM 50 MG AND SENNOSIDES 8.6 MG 2 TABLET: 8.6; 5 TABLET, FILM COATED ORAL at 10:42

## 2021-05-27 RX ADMIN — METOPROLOL TARTRATE 12.5 MG: 25 TABLET, FILM COATED ORAL at 01:58

## 2021-05-27 RX ADMIN — IPRATROPIUM BROMIDE AND ALBUTEROL SULFATE 3 ML: 2.5; .5 SOLUTION RESPIRATORY (INHALATION) at 10:57

## 2021-05-27 RX ADMIN — METOPROLOL TARTRATE 25 MG: 25 TABLET, FILM COATED ORAL at 10:42

## 2021-05-27 RX ADMIN — INSULIN LISPRO 18 UNITS: 100 INJECTION, SOLUTION INTRAVENOUS; SUBCUTANEOUS at 13:17

## 2021-05-27 RX ADMIN — SERTRALINE HYDROCHLORIDE 75 MG: 50 TABLET ORAL at 12:29

## 2021-05-27 RX ADMIN — METHYLPREDNISOLONE SODIUM SUCCINATE 40 MG: 40 INJECTION, POWDER, FOR SOLUTION INTRAMUSCULAR; INTRAVENOUS at 13:17

## 2021-05-27 RX ADMIN — Medication 3 ML: at 10:42

## 2021-05-27 RX ADMIN — INSULIN LISPRO 6 UNITS: 100 INJECTION, SOLUTION INTRAVENOUS; SUBCUTANEOUS at 01:58

## 2021-05-27 RX ADMIN — HYDROXYZINE HYDROCHLORIDE 25 MG: 25 TABLET, FILM COATED ORAL at 13:16

## 2021-05-27 RX ADMIN — INSULIN LISPRO 4 UNITS: 100 INJECTION, SOLUTION INTRAVENOUS; SUBCUTANEOUS at 13:16

## 2021-05-30 LAB — QT INTERVAL: 311 MS

## 2021-08-12 ENCOUNTER — TELEPHONE (OUTPATIENT)
Dept: ENDOCRINOLOGY | Facility: CLINIC | Age: 56
End: 2021-08-12

## 2021-10-02 ENCOUNTER — TELEPHONE (OUTPATIENT)
Dept: ENDOCRINOLOGY | Facility: CLINIC | Age: 56
End: 2021-10-02

## 2021-10-02 NOTE — TELEPHONE ENCOUNTER
Patient called to report high blood sugar of 461 through answering service.  I returned her phone call, she did not answer, voicemail left to on her phone to call back if needed.

## 2021-12-14 ENCOUNTER — TELEPHONE (OUTPATIENT)
Dept: ENDOCRINOLOGY | Facility: CLINIC | Age: 56
End: 2021-12-14

## 2021-12-14 DIAGNOSIS — E10.65 UNCONTROLLED TYPE 1 DIABETES MELLITUS WITH HYPERGLYCEMIA (HCC): Primary | ICD-10-CM

## 2021-12-14 RX ORDER — PROCHLORPERAZINE 25 MG/1
SUPPOSITORY RECTAL
Qty: 3 EACH | Refills: 5 | Status: SHIPPED | OUTPATIENT
Start: 2021-12-14

## 2021-12-14 RX ORDER — PROCHLORPERAZINE 25 MG/1
SUPPOSITORY RECTAL
COMMUNITY
End: 2021-12-14 | Stop reason: SDUPTHER

## 2021-12-14 RX ORDER — PROCHLORPERAZINE 25 MG/1
1 SUPPOSITORY RECTAL CONTINUOUS
Qty: 1 EACH | Refills: 2 | Status: SHIPPED | OUTPATIENT
Start: 2021-12-14

## 2021-12-14 NOTE — TELEPHONE ENCOUNTER
Email from CLAUDIA Hernandez:     Sure thing! So according to [facility] they were never able to get logs for the patients and they have had trouble reaching the patient, could you have her call [phone] please?    Gave pt phone number and she plans to call.

## 2021-12-14 NOTE — TELEPHONE ENCOUNTER
Pt has not had her Dexcom supplies for the past month d/t [facility]'s contract with her insurance . Dexcom supplies may be covered under pharmacy benefit. Will send to pharmacy and pt is to let us know if not covered. She also stated that she never heard back from anyone regarding the Tslim insulin pump that she was interested in getting. Emailed CLAUDIA Hernandez with [employer] re the status of her order.

## 2022-01-03 DIAGNOSIS — E10.65 UNCONTROLLED TYPE 1 DIABETES MELLITUS WITH HYPERGLYCEMIA: ICD-10-CM

## 2022-01-03 RX ORDER — INSULIN GLARGINE 100 [IU]/ML
55 INJECTION, SOLUTION SUBCUTANEOUS NIGHTLY
Qty: 30 ML | Refills: 0 | Status: SHIPPED | OUTPATIENT
Start: 2022-01-03

## 2022-05-07 NOTE — THERAPY RE-EVALUATION
Acute Care - Speech Language Pathology   Swallow Treatment Note  [facility]     Patient Name: Smita Steele  : 1965  MRN: [id number]  Today's Date: 2020               Admit Date: 2020    Visit Dx:      ICD-10-CM ICD-9-CM   1. Altered mental status, unspecified altered mental status type R41.82 780.97     Patient Active Problem List   Diagnosis   • Hypokalemia, inadequate intake   • Hyperglycemia   • Tobacco abuse   • Alcoholism /alcohol abuse (CMS/HCC)   • Hyponatremia   • Transaminitis   • Insomnia   • Hypophosphatemia   • Starvation ketoacidosis   • Type 1 diabetes mellitus with hyperglycemia (CMS/[facility])   • Acquired hypothyroidism   • Altered mental status   • Diabetes (CMS/[facility])   • BRITTANY (acute kidney injury) (CMS/[facility])   • COPD (chronic obstructive pulmonary disease) (CMS/[facility])   • Depression       Therapy Treatment  Rehabilitation Treatment Summary     Row Name 20 1000          Discipline  speech language pathologist  -    Document Type  re-evaluation  -    Subjective Information  no complaints  -LF    Mode of Treatment  individual therapy;speech-language pathology  -    Patient/Family Observations  Pt awake and alert in bed upon entry  -LF    Patient Effort  good  -LF    Treatment Considerations/Comments  PPE worn: gloves, mask, and glasses  -LF    Recorded by [] Angeline Arambula SLP 20 1047    Row Name 20 1000          Daily Summary of Progress (SLP)  progress toward functional goals is good  -LF    Plan for Continued Treatment (SLP)  Recommend pt continue regular and thin liquid diet. ST will f/u x1 to ensure diet tolerance  -LF    Recorded by [] Angeline Arambula SLP 20 1047      User Key  (r) = Recorded By, (t) = Taken By, (c) = Cosigned By    Initials Name Effective Dates Discipline    LF Angeline Arambula SLP 20 -  SLP          Outcome Summary  Outcome Summary/Treatment Plan (SLP)  Daily Summary of Progress (SLP): progress toward functional goals is  good (07/27/20 1000 : Angeline Arambula, SLP)  Plan for Continued Treatment (SLP): Recommend pt continue regular and thin liquid diet. ST will f/u x1 to ensure diet tolerance (07/27/20 1000 : Angeline Arambula, SLP)      SLP GOALS     Row Name 07/27/20 1000          Oral Nutrition/Hydration Goal 1, SLP  Pt to participate in re-eval of swallow for safest & least restrictive diet recommendation.  -LF    Time Frame (Oral Nutrition/Hydration Goal 1, SLP)  1 day  -LF    Barriers (Oral Nutrition/Hydration Goal 1, SLP) Pt participated in swallow re-eval this date. Following swallow eval on 7/26, Recommend pt be NPO with only sips of water. Pt placed on consitent carb diet on 7/26 per NP. Pt given trials of water by straw, puree, mechanical soft, and regular solids. Oral phase characterized by adequate mastication and bolus control with no anterior labial spillage, pocketing or residue noted. No overt s/s of aspiration observed at any time. Pt's vocal quality remained clear throughout evaluation. Recommend pt continue regular and thin liquid diet. -LF          Oral Nutrition/Hydration Goal 2, SLP  Pt to tolerate safest & least restrictive diet recommendations w/no complications from aspiration  -LF    Time Frame (Oral Nutrition/Hydration Goal 2, SLP)  by discharge  -LF    Barriers (Oral Nutrition/Hydration Goal 2, SLP)  See above note  -LF    Progress/Outcomes (Oral Nutrition/Hydration Goal 2, SLP)  continuing progress toward goal  -LF      User Key  (r) = Recorded By, (t) = Taken By, (c) = Cosigned By    Initials Name Provider Type    Chelsea Blackburn Speech and Language Pathologist    LF Angeline Arambula, SLP Speech and Language Pathologist          EDUCATION  The patient has been educated in the following areas:   safe swallow strategies.    SLP Recommendation and Plan  Daily Summary of Progress (SLP): progress toward functional goals is good     Plan for Continued Treatment (SLP): Recommend pt continue regular and thin  liquid diet. ST will f/u x1 to ensure diet tolerance                       Time Calculation:       Therapy Charges for Today     Code Description Service Date Service Provider Modifiers Qty    [id number]  ST TREATMENT SWALLOW 4 7/27/2020 Angeline Arambula, SLP GN 1                 Angeline Arambula, SLP  7/27/2020   Alert and oriented, no focal deficits, no motor or sensory deficits.

## 2022-05-10 RX ORDER — INSULIN LISPRO 100 [IU]/ML
INJECTION, SOLUTION INTRAVENOUS; SUBCUTANEOUS
Qty: 30 ML | Refills: 0 | Status: SHIPPED | OUTPATIENT
Start: 2022-05-10

## 2022-05-24 RX ORDER — METOPROLOL TARTRATE 50 MG/1
TABLET, FILM COATED ORAL
COMMUNITY
Start: 2022-02-19

## 2024-01-09 NOTE — OUTREACH NOTE
Medical Week 1 Survey      Responses   [facility] patient discharged from?  [facility]   COVID-19 Test Status  Negative   Does the patient have one of the following disease processes/diagnoses(primary or secondary)?  Other   Is there a successful TCM telephone encounter documented?  No   Week 1 attempt successful?  No   Unsuccessful attempts  Attempt 3          Miranda Ramirez LPN   TRANS SEPTAL #1 ACHIEVED